# Patient Record
Sex: FEMALE | Race: WHITE | NOT HISPANIC OR LATINO
[De-identification: names, ages, dates, MRNs, and addresses within clinical notes are randomized per-mention and may not be internally consistent; named-entity substitution may affect disease eponyms.]

---

## 2017-01-08 ENCOUNTER — TRANSCRIPTION ENCOUNTER (OUTPATIENT)
Age: 37
End: 2017-01-08

## 2017-07-31 ENCOUNTER — APPOINTMENT (OUTPATIENT)
Dept: MRI IMAGING | Facility: CLINIC | Age: 37
End: 2017-07-31
Payer: COMMERCIAL

## 2017-07-31 ENCOUNTER — OUTPATIENT (OUTPATIENT)
Dept: OUTPATIENT SERVICES | Facility: HOSPITAL | Age: 37
LOS: 1 days | End: 2017-07-31

## 2017-07-31 PROCEDURE — 72148 MRI LUMBAR SPINE W/O DYE: CPT | Mod: 26

## 2017-08-16 ENCOUNTER — APPOINTMENT (OUTPATIENT)
Dept: ORTHOPEDIC SURGERY | Facility: CLINIC | Age: 37
End: 2017-08-16
Payer: COMMERCIAL

## 2017-08-16 VITALS
HEIGHT: 62 IN | SYSTOLIC BLOOD PRESSURE: 110 MMHG | WEIGHT: 127 LBS | DIASTOLIC BLOOD PRESSURE: 60 MMHG | BODY MASS INDEX: 23.37 KG/M2

## 2017-08-16 DIAGNOSIS — Z80.9 FAMILY HISTORY OF MALIGNANT NEOPLASM, UNSPECIFIED: ICD-10-CM

## 2017-08-16 DIAGNOSIS — Z82.61 FAMILY HISTORY OF ARTHRITIS: ICD-10-CM

## 2017-08-16 DIAGNOSIS — Z87.19 PERSONAL HISTORY OF OTHER DISEASES OF THE DIGESTIVE SYSTEM: ICD-10-CM

## 2017-08-16 DIAGNOSIS — Z78.9 OTHER SPECIFIED HEALTH STATUS: ICD-10-CM

## 2017-08-16 PROCEDURE — 99204 OFFICE O/P NEW MOD 45 MIN: CPT

## 2017-08-18 ENCOUNTER — OTHER (OUTPATIENT)
Age: 37
End: 2017-08-18

## 2017-08-19 ENCOUNTER — TRANSCRIPTION ENCOUNTER (OUTPATIENT)
Age: 37
End: 2017-08-19

## 2017-09-10 ENCOUNTER — EMERGENCY (EMERGENCY)
Facility: HOSPITAL | Age: 37
LOS: 1 days | Discharge: HOME | End: 2017-09-10
Attending: EMERGENCY MEDICINE | Admitting: EMERGENCY MEDICINE
Payer: COMMERCIAL

## 2017-09-10 VITALS
TEMPERATURE: 98 F | WEIGHT: 126.99 LBS | RESPIRATION RATE: 14 BRPM | DIASTOLIC BLOOD PRESSURE: 62 MMHG | OXYGEN SATURATION: 100 % | HEIGHT: 62 IN | HEART RATE: 72 BPM | SYSTOLIC BLOOD PRESSURE: 116 MMHG

## 2017-09-10 DIAGNOSIS — M79.662 PAIN IN LEFT LOWER LEG: ICD-10-CM

## 2017-09-10 DIAGNOSIS — Z79.52 LONG TERM (CURRENT) USE OF SYSTEMIC STEROIDS: ICD-10-CM

## 2017-09-10 DIAGNOSIS — Z79.2 LONG TERM (CURRENT) USE OF ANTIBIOTICS: ICD-10-CM

## 2017-09-10 DIAGNOSIS — M25.569 PAIN IN UNSPECIFIED KNEE: Chronic | ICD-10-CM

## 2017-09-10 LAB
ALBUMIN SERPL ELPH-MCNC: 4.4 G/DL — SIGNIFICANT CHANGE UP (ref 3.4–5)
ALP SERPL-CCNC: 58 U/L — SIGNIFICANT CHANGE UP (ref 40–120)
ALT FLD-CCNC: 30 U/L — SIGNIFICANT CHANGE UP (ref 12–42)
ANION GAP SERPL CALC-SCNC: 10 MMOL/L — SIGNIFICANT CHANGE UP (ref 9–16)
AST SERPL-CCNC: 27 U/L — SIGNIFICANT CHANGE UP (ref 15–37)
BASOPHILS NFR BLD AUTO: 0.5 % — SIGNIFICANT CHANGE UP (ref 0–2)
BILIRUB SERPL-MCNC: 0.4 MG/DL — SIGNIFICANT CHANGE UP (ref 0.2–1.2)
BUN SERPL-MCNC: 23 MG/DL — SIGNIFICANT CHANGE UP (ref 7–23)
CALCIUM SERPL-MCNC: 9.1 MG/DL — SIGNIFICANT CHANGE UP (ref 8.5–10.5)
CHLORIDE SERPL-SCNC: 102 MMOL/L — SIGNIFICANT CHANGE UP (ref 96–108)
CK SERPL-CCNC: 89 U/L — SIGNIFICANT CHANGE UP (ref 26–192)
CO2 SERPL-SCNC: 27 MMOL/L — SIGNIFICANT CHANGE UP (ref 22–31)
CREAT SERPL-MCNC: 0.81 MG/DL — SIGNIFICANT CHANGE UP (ref 0.5–1.3)
D DIMER BLD IA.RAPID-MCNC: 414 NG/ML DDU — HIGH
EOSINOPHIL NFR BLD AUTO: 6 % — SIGNIFICANT CHANGE UP (ref 0–6)
GLUCOSE SERPL-MCNC: 95 MG/DL — SIGNIFICANT CHANGE UP (ref 70–99)
HCG SERPL-ACNC: <1 MIU/ML — SIGNIFICANT CHANGE UP
HCT VFR BLD CALC: 39.9 % — SIGNIFICANT CHANGE UP (ref 34.5–45)
HGB BLD-MCNC: 13.2 G/DL — SIGNIFICANT CHANGE UP (ref 11.5–15.5)
IMM GRANULOCYTES NFR BLD AUTO: 0.3 % — SIGNIFICANT CHANGE UP (ref 0–1.5)
LYMPHOCYTES # BLD AUTO: 27.2 % — SIGNIFICANT CHANGE UP (ref 13–44)
MCHC RBC-ENTMCNC: 30.3 PG — SIGNIFICANT CHANGE UP (ref 27–34)
MCHC RBC-ENTMCNC: 33.1 G/DL — SIGNIFICANT CHANGE UP (ref 32–36)
MCV RBC AUTO: 91.5 FL — SIGNIFICANT CHANGE UP (ref 80–100)
MONOCYTES NFR BLD AUTO: 6.7 % — SIGNIFICANT CHANGE UP (ref 2–14)
NEUTROPHILS NFR BLD AUTO: 59.3 % — SIGNIFICANT CHANGE UP (ref 43–77)
PLATELET # BLD AUTO: 234 K/UL — SIGNIFICANT CHANGE UP (ref 150–400)
POTASSIUM SERPL-MCNC: 3.8 MMOL/L — SIGNIFICANT CHANGE UP (ref 3.5–5.3)
POTASSIUM SERPL-SCNC: 3.8 MMOL/L — SIGNIFICANT CHANGE UP (ref 3.5–5.3)
PROT SERPL-MCNC: 8.3 G/DL — HIGH (ref 6.4–8.2)
RBC # BLD: 4.36 M/UL — SIGNIFICANT CHANGE UP (ref 3.8–5.2)
RBC # FLD: 13.9 % — SIGNIFICANT CHANGE UP (ref 10.3–16.9)
SODIUM SERPL-SCNC: 139 MMOL/L — SIGNIFICANT CHANGE UP (ref 132–145)
WBC # BLD: 7.9 K/UL — SIGNIFICANT CHANGE UP (ref 3.8–10.5)
WBC # FLD AUTO: 7.9 K/UL — SIGNIFICANT CHANGE UP (ref 3.8–10.5)

## 2017-09-10 PROCEDURE — 93970 EXTREMITY STUDY: CPT | Mod: 26

## 2017-09-10 PROCEDURE — 99284 EMERGENCY DEPT VISIT MOD MDM: CPT | Mod: 25

## 2017-09-10 PROCEDURE — 93010 ELECTROCARDIOGRAM REPORT: CPT

## 2017-09-10 NOTE — ED ADULT NURSE NOTE - NS ED NURSE DC INFO COMPLEXITY
Verbalized Understanding/Complex: Multiple Rx/Tx. Pt has difficulty understanding. Requires additional help/Patient asked questions/Returned Demonstration

## 2017-09-10 NOTE — ED PROVIDER NOTE - OBJECTIVE STATEMENT
36 y/o F with no pmhx p/w LLE pain for the past 3-4 days which started while exercising. Pt reports today she had sudden onset sharp pain and palpitations.

## 2017-09-26 ENCOUNTER — APPOINTMENT (OUTPATIENT)
Dept: ORTHOPEDIC SURGERY | Facility: CLINIC | Age: 37
End: 2017-09-26
Payer: COMMERCIAL

## 2017-09-26 VITALS — WEIGHT: 127 LBS | HEIGHT: 62 IN | BODY MASS INDEX: 23.37 KG/M2

## 2017-09-26 DIAGNOSIS — M54.5 LOW BACK PAIN: ICD-10-CM

## 2017-09-26 DIAGNOSIS — M51.36 OTHER INTERVERTEBRAL DISC DEGENERATION, LUMBAR REGION: ICD-10-CM

## 2017-09-26 DIAGNOSIS — G89.29 LOW BACK PAIN: ICD-10-CM

## 2017-09-26 PROCEDURE — 99214 OFFICE O/P EST MOD 30 MIN: CPT

## 2018-02-21 ENCOUNTER — APPOINTMENT (OUTPATIENT)
Dept: DERMATOLOGY | Facility: CLINIC | Age: 38
End: 2018-02-21

## 2018-03-10 ENCOUNTER — APPOINTMENT (OUTPATIENT)
Dept: DERMATOLOGY | Facility: CLINIC | Age: 38
End: 2018-03-10

## 2018-03-12 ENCOUNTER — OUTPATIENT (OUTPATIENT)
Dept: OUTPATIENT SERVICES | Facility: HOSPITAL | Age: 38
LOS: 1 days | End: 2018-03-12
Payer: COMMERCIAL

## 2018-03-12 ENCOUNTER — APPOINTMENT (OUTPATIENT)
Dept: ULTRASOUND IMAGING | Facility: CLINIC | Age: 38
End: 2018-03-12
Payer: COMMERCIAL

## 2018-03-12 DIAGNOSIS — M25.569 PAIN IN UNSPECIFIED KNEE: Chronic | ICD-10-CM

## 2018-03-12 PROCEDURE — 76641 ULTRASOUND BREAST COMPLETE: CPT

## 2018-03-12 PROCEDURE — 76641 ULTRASOUND BREAST COMPLETE: CPT | Mod: 26,LT

## 2018-07-13 ENCOUNTER — EMERGENCY (EMERGENCY)
Facility: HOSPITAL | Age: 38
LOS: 1 days | Discharge: HOME | End: 2018-07-13
Attending: EMERGENCY MEDICINE | Admitting: EMERGENCY MEDICINE
Payer: COMMERCIAL

## 2018-07-13 ENCOUNTER — TRANSCRIPTION ENCOUNTER (OUTPATIENT)
Age: 38
End: 2018-07-13

## 2018-07-13 VITALS
SYSTOLIC BLOOD PRESSURE: 111 MMHG | WEIGHT: 119.93 LBS | OXYGEN SATURATION: 99 % | TEMPERATURE: 99 F | HEART RATE: 87 BPM | RESPIRATION RATE: 16 BRPM | DIASTOLIC BLOOD PRESSURE: 77 MMHG

## 2018-07-13 DIAGNOSIS — R09.89 OTHER SPECIFIED SYMPTOMS AND SIGNS INVOLVING THE CIRCULATORY AND RESPIRATORY SYSTEMS: ICD-10-CM

## 2018-07-13 DIAGNOSIS — Z88.0 ALLERGY STATUS TO PENICILLIN: ICD-10-CM

## 2018-07-13 DIAGNOSIS — J02.9 ACUTE PHARYNGITIS, UNSPECIFIED: ICD-10-CM

## 2018-07-13 DIAGNOSIS — Z79.899 OTHER LONG TERM (CURRENT) DRUG THERAPY: ICD-10-CM

## 2018-07-13 DIAGNOSIS — M25.569 PAIN IN UNSPECIFIED KNEE: Chronic | ICD-10-CM

## 2018-07-13 PROCEDURE — 99283 EMERGENCY DEPT VISIT LOW MDM: CPT | Mod: 25

## 2018-07-13 PROCEDURE — 72040 X-RAY EXAM NECK SPINE 2-3 VW: CPT | Mod: 26

## 2018-07-13 PROCEDURE — 70360 X-RAY EXAM OF NECK: CPT | Mod: 26

## 2018-07-13 NOTE — ED PROVIDER NOTE - PHYSICAL EXAMINATION
VITAL SIGNS: I have reviewed nursing notes and confirm.  CONSTITUTIONAL: Well-developed; well-nourished; in no acute distress.  SKIN: Skin is warm and dry, no acute rash.  HEAD: Normocephalic; atraumatic.  EYES: PERRL, EOM intact; conjunctiva and sclera clear.  ENT: No nasal discharge; airway clear. No throat tenderness on palpation  NECK: Supple; non tender.  CARD: S1, S2 normal; no murmurs, gallops, or rubs. Regular rate and rhythm.  RESP: No wheezes, rales or rhonchi.  ABD: Normal bowel sounds; soft; non-distended; non-tender; no hepatosplenomegaly.  EXT: Normal ROM. No clubbing, cyanosis or edema.  NEURO: Alert, oriented. Grossly unremarkable.  PSYCH: Cooperative, appropriate. VITAL SIGNS: I have reviewed nursing notes and confirm.  CONSTITUTIONAL: Well-developed; well-nourished; in no acute distress.  SKIN: Skin is warm and dry, no acute rash.  HEAD: Normocephalic; atraumatic.  EYES: PERRL, EOM intact; conjunctiva and sclera clear.  ENT: No nasal discharge; airway clear. No throat tenderness on palpation.  No crepitus.   NECK: Supple; non tender.  CARD: S1, S2 normal; no murmurs, gallops, or rubs. Regular rate and rhythm.  RESP: No wheezes, rales or rhonchi.  No TTP over rib cage.   ABD: Normal bowel sounds; soft; non-distended; non-tender; no hepatosplenomegaly.  EXT: Normal ROM. No clubbing, cyanosis or edema.  NEURO: Alert, oriented. Grossly unremarkable.  PSYCH: Cooperative, appropriate.

## 2018-07-13 NOTE — ED ADULT NURSE NOTE - OBJECTIVE STATEMENT
Pt c/o throat pain and hoarse voice s/p Heimlich from choking on raw clam. Pt denies any difficulty breathing, airway patent, speaking in full complete sentences.

## 2018-07-13 NOTE — ED PROVIDER NOTE - MEDICAL DECISION MAKING DETAILS
will order Xray to r/o any soft tissue damage. will order Xray to r/o any soft tissue damage.  Pt appears clinically well and stable.     Discussed xray and emergent return precautions.

## 2018-07-13 NOTE — ED ADULT NURSE NOTE - CHPI ED SYMPTOMS NEG
no loss of consciousness/no nausea/no vomiting/no change in level of consciousness/no chills/no weakness/no fever/no numbness/no syncope/no blurred vision
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
## 2018-07-26 ENCOUNTER — APPOINTMENT (OUTPATIENT)
Dept: HUMAN REPRODUCTION | Facility: CLINIC | Age: 38
End: 2018-07-26
Payer: COMMERCIAL

## 2018-07-26 PROCEDURE — 99204 OFFICE O/P NEW MOD 45 MIN: CPT | Mod: 25

## 2018-07-26 PROCEDURE — 36415 COLL VENOUS BLD VENIPUNCTURE: CPT

## 2018-07-26 PROCEDURE — 76830 TRANSVAGINAL US NON-OB: CPT

## 2018-08-23 ENCOUNTER — APPOINTMENT (OUTPATIENT)
Dept: HUMAN REPRODUCTION | Facility: CLINIC | Age: 38
End: 2018-08-23

## 2018-10-25 ENCOUNTER — EMERGENCY (EMERGENCY)
Facility: HOSPITAL | Age: 38
LOS: 1 days | Discharge: HOME | End: 2018-10-25
Attending: EMERGENCY MEDICINE | Admitting: EMERGENCY MEDICINE
Payer: COMMERCIAL

## 2018-10-25 VITALS
TEMPERATURE: 98 F | RESPIRATION RATE: 18 BRPM | DIASTOLIC BLOOD PRESSURE: 79 MMHG | SYSTOLIC BLOOD PRESSURE: 113 MMHG | OXYGEN SATURATION: 100 % | WEIGHT: 138.01 LBS | HEIGHT: 62 IN | HEART RATE: 82 BPM

## 2018-10-25 DIAGNOSIS — Z79.899 OTHER LONG TERM (CURRENT) DRUG THERAPY: ICD-10-CM

## 2018-10-25 DIAGNOSIS — Z79.2 LONG TERM (CURRENT) USE OF ANTIBIOTICS: ICD-10-CM

## 2018-10-25 DIAGNOSIS — M25.569 PAIN IN UNSPECIFIED KNEE: Chronic | ICD-10-CM

## 2018-10-25 DIAGNOSIS — O20.9 HEMORRHAGE IN EARLY PREGNANCY, UNSPECIFIED: ICD-10-CM

## 2018-10-25 DIAGNOSIS — Z3A.16 16 WEEKS GESTATION OF PREGNANCY: ICD-10-CM

## 2018-10-25 PROBLEM — K29.70 GASTRITIS, UNSPECIFIED, WITHOUT BLEEDING: Chronic | Status: ACTIVE | Noted: 2017-09-10

## 2018-10-25 LAB
ALBUMIN SERPL ELPH-MCNC: 3.9 G/DL — SIGNIFICANT CHANGE UP (ref 3.3–5)
ALP SERPL-CCNC: 40 U/L — SIGNIFICANT CHANGE UP (ref 40–120)
ALT FLD-CCNC: 32 U/L — SIGNIFICANT CHANGE UP (ref 10–45)
ANION GAP SERPL CALC-SCNC: 13 MMOL/L — SIGNIFICANT CHANGE UP (ref 5–17)
APPEARANCE UR: CLEAR — SIGNIFICANT CHANGE UP
AST SERPL-CCNC: 27 U/L — SIGNIFICANT CHANGE UP (ref 10–40)
BASOPHILS NFR BLD AUTO: 0.2 % — SIGNIFICANT CHANGE UP (ref 0–2)
BILIRUB SERPL-MCNC: 0.3 MG/DL — SIGNIFICANT CHANGE UP (ref 0.2–1.2)
BILIRUB UR-MCNC: NEGATIVE — SIGNIFICANT CHANGE UP
BLD GP AB SCN SERPL QL: NEGATIVE — SIGNIFICANT CHANGE UP
BUN SERPL-MCNC: 14 MG/DL — SIGNIFICANT CHANGE UP (ref 7–23)
CALCIUM SERPL-MCNC: 9.6 MG/DL — SIGNIFICANT CHANGE UP (ref 8.4–10.5)
CHLORIDE SERPL-SCNC: 100 MMOL/L — SIGNIFICANT CHANGE UP (ref 96–108)
CO2 SERPL-SCNC: 24 MMOL/L — SIGNIFICANT CHANGE UP (ref 22–31)
COLOR SPEC: YELLOW — SIGNIFICANT CHANGE UP
CREAT SERPL-MCNC: 0.62 MG/DL — SIGNIFICANT CHANGE UP (ref 0.5–1.3)
DIFF PNL FLD: ABNORMAL
EOSINOPHIL NFR BLD AUTO: 6.7 % — HIGH (ref 0–6)
GLUCOSE SERPL-MCNC: 88 MG/DL — SIGNIFICANT CHANGE UP (ref 70–99)
GLUCOSE UR QL: NEGATIVE — SIGNIFICANT CHANGE UP
HCT VFR BLD CALC: 32.3 % — LOW (ref 34.5–45)
HGB BLD-MCNC: 10.9 G/DL — LOW (ref 11.5–15.5)
KETONES UR-MCNC: NEGATIVE — SIGNIFICANT CHANGE UP
LEUKOCYTE ESTERASE UR-ACNC: NEGATIVE — SIGNIFICANT CHANGE UP
LYMPHOCYTES # BLD AUTO: 27.6 % — SIGNIFICANT CHANGE UP (ref 13–44)
MCHC RBC-ENTMCNC: 30.8 PG — SIGNIFICANT CHANGE UP (ref 27–34)
MCHC RBC-ENTMCNC: 33.7 G/DL — SIGNIFICANT CHANGE UP (ref 32–36)
MCV RBC AUTO: 91.2 FL — SIGNIFICANT CHANGE UP (ref 80–100)
MONOCYTES NFR BLD AUTO: 10.3 % — SIGNIFICANT CHANGE UP (ref 2–14)
NEUTROPHILS NFR BLD AUTO: 55.2 % — SIGNIFICANT CHANGE UP (ref 43–77)
NITRITE UR-MCNC: NEGATIVE — SIGNIFICANT CHANGE UP
PH UR: 5.5 — SIGNIFICANT CHANGE UP (ref 5–8)
PLATELET # BLD AUTO: 198 K/UL — SIGNIFICANT CHANGE UP (ref 150–400)
POTASSIUM SERPL-MCNC: 3.6 MMOL/L — SIGNIFICANT CHANGE UP (ref 3.5–5.3)
POTASSIUM SERPL-SCNC: 3.6 MMOL/L — SIGNIFICANT CHANGE UP (ref 3.5–5.3)
PROT SERPL-MCNC: 6.8 G/DL — SIGNIFICANT CHANGE UP (ref 6–8.3)
PROT UR-MCNC: NEGATIVE MG/DL — SIGNIFICANT CHANGE UP
RBC # BLD: 3.54 M/UL — LOW (ref 3.8–5.2)
RBC # FLD: 13.6 % — SIGNIFICANT CHANGE UP (ref 10.3–16.9)
RH IG SCN BLD-IMP: POSITIVE — SIGNIFICANT CHANGE UP
SODIUM SERPL-SCNC: 137 MMOL/L — SIGNIFICANT CHANGE UP (ref 135–145)
SP GR SPEC: 1.02 — SIGNIFICANT CHANGE UP (ref 1–1.03)
UROBILINOGEN FLD QL: 0.2 E.U./DL — SIGNIFICANT CHANGE UP
WBC # BLD: 8.6 K/UL — SIGNIFICANT CHANGE UP (ref 3.8–10.5)
WBC # FLD AUTO: 8.6 K/UL — SIGNIFICANT CHANGE UP (ref 3.8–10.5)

## 2018-10-25 PROCEDURE — 99284 EMERGENCY DEPT VISIT MOD MDM: CPT | Mod: 25

## 2018-10-25 PROCEDURE — 76805 OB US >/= 14 WKS SNGL FETUS: CPT

## 2018-10-25 PROCEDURE — 86901 BLOOD TYPING SEROLOGIC RH(D): CPT

## 2018-10-25 PROCEDURE — 76805 OB US >/= 14 WKS SNGL FETUS: CPT | Mod: 26

## 2018-10-25 PROCEDURE — 80053 COMPREHEN METABOLIC PANEL: CPT

## 2018-10-25 PROCEDURE — 86900 BLOOD TYPING SEROLOGIC ABO: CPT

## 2018-10-25 PROCEDURE — 86850 RBC ANTIBODY SCREEN: CPT

## 2018-10-25 PROCEDURE — 76817 TRANSVAGINAL US OBSTETRIC: CPT

## 2018-10-25 PROCEDURE — 84702 CHORIONIC GONADOTROPIN TEST: CPT

## 2018-10-25 PROCEDURE — 76817 TRANSVAGINAL US OBSTETRIC: CPT | Mod: 26

## 2018-10-25 PROCEDURE — 36415 COLL VENOUS BLD VENIPUNCTURE: CPT

## 2018-10-25 PROCEDURE — 87086 URINE CULTURE/COLONY COUNT: CPT

## 2018-10-25 PROCEDURE — 85025 COMPLETE CBC W/AUTO DIFF WBC: CPT

## 2018-10-25 PROCEDURE — 81001 URINALYSIS AUTO W/SCOPE: CPT

## 2018-10-25 NOTE — CONSULT NOTE ADULT - SUBJECTIVE AND OBJECTIVE BOX
Patient evaluated at bedside.   Patient denies fever, chills, chest pain, SOB, abdominal pain, nausea, vomiting, vaginal bleeding      OBHx:   GYN Hx:  PMHx:   SHx:  Meds:   Allergies:     PHYSICAL EXAM:   Vital Signs Last 24 Hrs  T(C): 36.6 (25 Oct 2018 00:37), Max: 36.6 (25 Oct 2018 00:37)  T(F): 97.9 (25 Oct 2018 00:37), Max: 97.9 (25 Oct 2018 00:37)  HR: 82 (25 Oct 2018 00:37) (82 - 82)  BP: 113/79 (25 Oct 2018 00:37) (113/79 - 113/79)  BP(mean): --  RR: 18 (25 Oct 2018 00:37) (18 - 18)  SpO2: 100% (25 Oct 2018 00:37) (100% - 100%)    **************************  Constitutional: Alert & Oriented x3, No acute distress  Respiratory: Clear to ausculation bilaterally; no wheezing, rhonchi, or crackles  Cardiovascular: regular rate and rhythm, no murmurs, or gallops  Gastrointestinal: soft, non tender, positive bowel sounds, no rebound or guarding   Pelvic exam:   Extremities: no calf tenderness or swelling      LABS:                        10.9   8.6   )-----------( 198      ( 25 Oct 2018 02:02 )             32.3             Urinalysis Basic - ( 25 Oct 2018 01:58 )    Color: Yellow / Appearance: Clear / S.025 / pH: x  Gluc: x / Ketone: NEGATIVE  / Bili: Negative / Urobili: 0.2 E.U./dL   Blood: x / Protein: NEGATIVE mg/dL / Nitrite: NEGATIVE   Leuk Esterase: NEGATIVE / RBC: x / WBC x   Sq Epi: x / Non Sq Epi: x / Bacteria: x          RADIOLOGY & ADDITIONAL STUDIES:< from: US Echo Transvaginal, OB (10.25.18 @ 01:59) >  EXAM:  US OB TRANSVAGINAL                          EXAM:  US OB GRT THAN 14 WKS 1ST GEST                          PROCEDURE DATE:  10/25/2018          INTERPRETATION:  OBSTETRICAL ULTRASOUND - FIRST TRIMESTER dated   10/25/2018 1:54 AM    INDICATION: First trimester pregnancy with vaginal bleeding. History of   twin gestation with spontaneous miscarriage of one twin at 7 weeks. LMP:   2018. Beta-hCG is not available.    TECHNIQUE: Transabdominal views of the pelvis were obtained followed by  transvaginal views for better visualization of the endometrial cavity.      PRIOR STUDIES: None.    FINDINGS:   By dates, the estimated gestational age is 16 weeks 1 day.    Two intrauterine gestations are visible.     For twin A:  Average ultrasound age is 16 weeks 5 days.  Biparietal diameter is 3.5 cm, corresponding to a gestational age of 16   weeks 6 days.  Head circumference is 12.8 cm, corresponding to a gestational age of 16   weeks 4 days.  Abdominal circumference is 11.5 cm, corresponding to a gestational age of   17 weeks 3 days.  Femur length is 2.0 cm, corresponding to a gestational age of 60 weeks 1   day.  Fetal cardiac motion is visible with fetal heart rate of 154 beats per   minute.    A normal amount of amniotic fluid is evident for twin A. The placenta is   located posteriorly. No placenta previa is evident.  No subchorionic   bleed is visible.  The cervix is closed with a length of 4.1 cm.    A second nonviable fetus is noted with a crown-rump length of 1.3 cm.    The uterus is anteverted. The uterus is 15.2 x 11.3 x 12.7 cm.  There is   a 1.6 x 1.4 x 2.0 cm intramural fibroid in the anterior wall.     The right ovary is normal in size and appearance, measuring 1.8 x 2.8 x   2.1 cm. The left ovary is normal in size and appearance, measuring 1.8 x   3.2 x 1.4 cm. Doppler evaluation demonstrates flow to both ovaries with   no evidence of torsion.    No free fluid in the pelvis.      IMPRESSION:   1.  Two intrauterine gestations are visible, one of which is nonviable.   The viable gestation has an average ultrasound age of 16 weeks 5 days.  2.  Fibroid uterus.              "Thank you for the opportunity to participate in the care of this   patient."    MARIE DIAZ M.D., RADIOLOGY RESIDENT  This document has been electronically signed.  ROMI GRAHAM M.D., ATTENDING RADIOLOGIST  This document has been electronically signed. Oct 25 2018  2:15AM                  < end of copied text > 37 yo [id number] at  presents for evaluation of vaginal bleeding. Patient re  Patient denies fever, chills, chest pain, SOB, abdominal pain, nausea, vomiting, vaginal bleeding      OBHx:   GYN Hx:  PMHx:   SHx:  Meds:   Allergies:     PHYSICAL EXAM:   Vital Signs Last 24 Hrs  T(C): 36.6 (25 Oct 2018 00:37), Max: 36.6 (25 Oct 2018 00:37)  T(F): 97.9 (25 Oct 2018 00:37), Max: 97.9 (25 Oct 2018 00:37)  HR: 82 (25 Oct 2018 00:37) (82 - 82)  BP: 113/79 (25 Oct 2018 00:37) (113/79 - 113/79)  BP(mean): --  RR: 18 (25 Oct 2018 00:37) (18 - 18)  SpO2: 100% (25 Oct 2018 00:37) (100% - 100%)    **************************  Constitutional: Alert & Oriented x3, No acute distress  Respiratory: Clear to ausculation bilaterally; no wheezing, rhonchi, or crackles  Cardiovascular: regular rate and rhythm, no murmurs, or gallops  Gastrointestinal: soft, non tender, positive bowel sounds, no rebound or guarding   Pelvic exam:   Extremities: no calf tenderness or swelling      LABS:                        10.9   8.6   )-----------( 198      ( 25 Oct 2018 02:02 )             32.3             Urinalysis Basic - ( 25 Oct 2018 01:58 )    Color: Yellow / Appearance: Clear / S.025 / pH: x  Gluc: x / Ketone: NEGATIVE  / Bili: Negative / Urobili: 0.2 E.U./dL   Blood: x / Protein: NEGATIVE mg/dL / Nitrite: NEGATIVE   Leuk Esterase: NEGATIVE / RBC: x / WBC x   Sq Epi: x / Non Sq Epi: x / Bacteria: x          RADIOLOGY & ADDITIONAL STUDIES:< from: US Echo Transvaginal, OB (10.25.18 @ 01:59) >  EXAM:  US OB TRANSVAGINAL                          EXAM:  US OB GRT THAN 14 WKS 1ST GEST                          PROCEDURE DATE:  10/25/2018          INTERPRETATION:  OBSTETRICAL ULTRASOUND - FIRST TRIMESTER dated   10/25/2018 1:54 AM    INDICATION: First trimester pregnancy with vaginal bleeding. History of   twin gestation with spontaneous miscarriage of one twin at 7 weeks. LMP:   2018. Beta-hCG is not available.    TECHNIQUE: Transabdominal views of the pelvis were obtained followed by  transvaginal views for better visualization of the endometrial cavity.      PRIOR STUDIES: None.    FINDINGS:   By dates, the estimated gestational age is 16 weeks 1 day.    Two intrauterine gestations are visible.     For twin A:  Average ultrasound age is 16 weeks 5 days.  Biparietal diameter is 3.5 cm, corresponding to a gestational age of 16   weeks 6 days.  Head circumference is 12.8 cm, corresponding to a gestational age of 16   weeks 4 days.  Abdominal circumference is 11.5 cm, corresponding to a gestational age of   17 weeks 3 days.  Femur length is 2.0 cm, corresponding to a gestational age of 60 weeks 1   day.  Fetal cardiac motion is visible with fetal heart rate of 154 beats per   minute.    A normal amount of amniotic fluid is evident for twin A. The placenta is   located posteriorly. No placenta previa is evident.  No subchorionic   bleed is visible.  The cervix is closed with a length of 4.1 cm.    A second nonviable fetus is noted with a crown-rump length of 1.3 cm.    The uterus is anteverted. The uterus is 15.2 x 11.3 x 12.7 cm.  There is   a 1.6 x 1.4 x 2.0 cm intramural fibroid in the anterior wall.     The right ovary is normal in size and appearance, measuring 1.8 x 2.8 x   2.1 cm. The left ovary is normal in size and appearance, measuring 1.8 x   3.2 x 1.4 cm. Doppler evaluation demonstrates flow to both ovaries with   no evidence of torsion.    No free fluid in the pelvis.      IMPRESSION:   1.  Two intrauterine gestations are visible, one of which is nonviable.   The viable gestation has an average ultrasound age of 16 weeks 5 days.  2.  Fibroid uterus.              "Thank you for the opportunity to participate in the care of this   patient."    MARIE DIAZ M.D., RADIOLOGY RESIDENT  This document has been electronically signed.  ROMI GRAHAM M.D., ATTENDING RADIOLOGIST  This document has been electronically signed. Oct 25 2018  2:15AM                  < end of copied text > 39 yo [id number] at 16w1 by LMP of 18 presents for evaluation of vaginal bleeding. Patient reports having intercourse and then the onset of bleeding without clots. Patient denies abdominal pain, fever, chills, chest pain, SOB, abdominal pain, nausea, vomiting, and leakage of fluid. This pregnancy was a di-di pregnancy which was complicated by a spontaneous loss of Twin B at 7 weeks.     OBHx:  x3 - last , VTOP D&C x1, SAB x2; G7: current - clomid induced   GYN Hx: History of abnormal pap smear s/p colposcopy now with normal pap smears  PMHx: Denies   SHx: D&C, Left knee arthroscopy   Meds: PNV,   Allergies: nkda     PHYSICAL EXAM:   Vital Signs Last 24 Hrs  T(C): 36.6 (25 Oct 2018 00:37), Max: 36.6 (25 Oct 2018 00:37)  T(F): 97.9 (25 Oct 2018 00:37), Max: 97.9 (25 Oct 2018 00:37)  HR: 82 (25 Oct 2018 00:37) (82 - 82)  BP: 113/79 (25 Oct 2018 00:37) (113/79 - 113/79)  BP(mean): --  RR: 18 (25 Oct 2018 00:37) (18 - 18)  SpO2: 100% (25 Oct 2018 00:37) (100% - 100%)    **************************  Constitutional: Alert & Oriented x3, No acute distress  Gastrointestinal: soft, non tender, positive bowel sounds, no rebound or guarding   Pelvic exam: normal appearing external female genitalia, normal vagina, cervix appears closed, about 5 cc of blood in the vault, no adnexal fulls, cervic long and closed   Extremities: no calf tenderness or swelling      LABS:                        10.9   8.6   )-----------( 198      ( 25 Oct 2018 02:02 )             32.3             Urinalysis Basic - ( 25 Oct 2018 01:58 )    Color: Yellow / Appearance: Clear / S.025 / pH: x  Gluc: x / Ketone: NEGATIVE  / Bili: Negative / Urobili: 0.2 E.U./dL   Blood: x / Protein: NEGATIVE mg/dL / Nitrite: NEGATIVE   Leuk Esterase: NEGATIVE / RBC: x / WBC x   Sq Epi: x / Non Sq Epi: x / Bacteria: x          RADIOLOGY & ADDITIONAL STUDIES:< from: US Echo Transvaginal, OB (10.25.18 @ 01:59) >  EXAM:  US OB TRANSVAGINAL                          EXAM:  US OB GRT THAN 14 WKS 1ST GEST                          PROCEDURE DATE:  10/25/2018          INTERPRETATION:  OBSTETRICAL ULTRASOUND - FIRST TRIMESTER dated   10/25/2018 1:54 AM    INDICATION: First trimester pregnancy with vaginal bleeding. History of   twin gestation with spontaneous miscarriage of one twin at 7 weeks. LMP:   2018. Beta-hCG is not available.    TECHNIQUE: Transabdominal views of the pelvis were obtained followed by  transvaginal views for better visualization of the endometrial cavity.      PRIOR STUDIES: None.    FINDINGS:   By dates, the estimated gestational age is 16 weeks 1 day.    Two intrauterine gestations are visible.     For twin A:  Average ultrasound age is 16 weeks 5 days.  Biparietal diameter is 3.5 cm, corresponding to a gestational age of 16   weeks 6 days.  Head circumference is 12.8 cm, corresponding to a gestational age of 16   weeks 4 days.  Abdominal circumference is 11.5 cm, corresponding to a gestational age of   17 weeks 3 days.  Femur length is 2.0 cm, corresponding to a gestational age of 60 weeks 1   day.  Fetal cardiac motion is visible with fetal heart rate of 154 beats per   minute.    A normal amount of amniotic fluid is evident for twin A. The placenta is   located posteriorly. No placenta previa is evident.  No subchorionic   bleed is visible.  The cervix is closed with a length of 4.1 cm.    A second nonviable fetus is noted with a crown-rump length of 1.3 cm.    The uterus is anteverted. The uterus is 15.2 x 11.3 x 12.7 cm.  There is   a 1.6 x 1.4 x 2.0 cm intramural fibroid in the anterior wall.     The right ovary is normal in size and appearance, measuring 1.8 x 2.8 x   2.1 cm. The left ovary is normal in size and appearance, measuring 1.8 x   3.2 x 1.4 cm. Doppler evaluation demonstrates flow to both ovaries with   no evidence of torsion.    No free fluid in the pelvis.      IMPRESSION:   1.  Two intrauterine gestations are visible, one of which is nonviable.   The viable gestation has an average ultrasound age of 16 weeks 5 days.  2.  Fibroid uterus.    "Thank you for the opportunity to participate in the care of this   patient."    MARIE DIAZ M.D., RADIOLOGY RESIDENT  This document has been electronically signed.  ROMI GRAHAM M.D., ATTENDING RADIOLOGIST  This document has been electronically signed. Oct 25 2018  2:15AM

## 2018-10-25 NOTE — ED PROVIDER NOTE - OBJECTIVE STATEMENT
38 yof pw vaginal bleeding, 16 wk GA, initially twin gestation, spontaneous miscarriage of one twins at 7 wk GA, , 38 yof pw vaginal bleeding, 16 wk GA, initially twin gestation, spontaneous miscarriage of one twins at 7 wk GA, .  no hx of fibroids or incompetent cervix.  states sx began after sexual intercourse tonight.  bleeding has slowed down, now spotting.

## 2018-10-25 NOTE — ED PROVIDER NOTE - PHYSICAL EXAMINATION
CON: ao x 3, HENMT: clear oropharynx, soft neck, HEAD: atraumatic, RESP: no tachypnea, no hypoxia, GI: gravid, soft, SKIN: no rash, MSK: no deformities, NEURO: no gross motor or sensory deficit

## 2018-10-25 NOTE — ED ADULT NURSE NOTE - OBJECTIVE STATEMENT
Pt aaox3, breathing RA freely, skin warm and dry; appears comfortable. Denies pain, reporting episode of vaginal bleeding after sex, now spotting. 16 weeks pregnant. Awaiting lab results; will continue to monitor.

## 2018-10-25 NOTE — CONSULT NOTE ADULT - ASSESSMENT
Assessment and Plan:   39 yo  at 16w1 presents for evaluation of vaginal bleeding.   - No acute gyn intervention needed at this time  - Vitals and Labs WNL; US reassuring   - On exam, no active bleeding: advise strict pelvic rest, avoidance of heavy lighting and heavy exercise   - Patient to follow up at [facility] unit on Friday and in the office next week   - Strict return precautions given   - Rh positive status - no need for rhogam     Discussed with Dr. Yepez

## 2018-10-25 NOTE — ED PROVIDER NOTE - MEDICAL DECISION MAKING DETAILS
16 wk ga w/ bleeding after intercourse, no hx of previa/fibroids/incompetent cervix, bedside US w/ HR 150s, w/ fetal movement, ?from trauma, ?previa, ?other causes, GYN consulted, pt pending TVUS

## 2018-10-25 NOTE — ED ADULT NURSE NOTE - NSIMPLEMENTINTERV_GEN_ALL_ED
Implemented All Universal Safety Interventions:  [place] to call system. Call bell, personal items and telephone within reach. Instruct patient to call for assistance. Room bathroom lighting operational. Non-slip footwear when patient is off stretcher. Physically safe environment: no spills, clutter or unnecessary equipment. Stretcher in lowest position, wheels locked, appropriate side rails in place.

## 2018-10-26 LAB
CULTURE RESULTS: NO GROWTH — SIGNIFICANT CHANGE UP
SPECIMEN SOURCE: SIGNIFICANT CHANGE UP

## 2019-04-03 ENCOUNTER — INPATIENT (INPATIENT)
Facility: HOSPITAL | Age: 39
LOS: 2 days | Discharge: HOME | End: 2019-04-06
Attending: OBSTETRICS & GYNECOLOGY | Admitting: OBSTETRICS & GYNECOLOGY
Payer: COMMERCIAL

## 2019-04-03 VITALS — HEIGHT: 62 IN | WEIGHT: 164.91 LBS

## 2019-04-03 DIAGNOSIS — M25.569 PAIN IN UNSPECIFIED KNEE: Chronic | ICD-10-CM

## 2019-04-03 LAB
BASOPHILS # BLD AUTO: 0.06 K/UL — SIGNIFICANT CHANGE UP (ref 0–0.2)
BASOPHILS NFR BLD AUTO: 0.5 % — SIGNIFICANT CHANGE UP (ref 0–2)
BLD GP AB SCN SERPL QL: NEGATIVE — SIGNIFICANT CHANGE UP
EOSINOPHIL # BLD AUTO: 0.4 K/UL — SIGNIFICANT CHANGE UP (ref 0–0.5)
EOSINOPHIL NFR BLD AUTO: 3.6 % — SIGNIFICANT CHANGE UP (ref 0–6)
HCT VFR BLD CALC: 39.4 % — SIGNIFICANT CHANGE UP (ref 34.5–45)
HGB BLD-MCNC: 13.2 G/DL — SIGNIFICANT CHANGE UP (ref 11.5–15.5)
IMM GRANULOCYTES NFR BLD AUTO: 1.8 % — HIGH (ref 0–1.5)
LYMPHOCYTES # BLD AUTO: 1.82 K/UL — SIGNIFICANT CHANGE UP (ref 1–3.3)
LYMPHOCYTES # BLD AUTO: 16.4 % — SIGNIFICANT CHANGE UP (ref 13–44)
MCHC RBC-ENTMCNC: 30.9 PG — SIGNIFICANT CHANGE UP (ref 27–34)
MCHC RBC-ENTMCNC: 33.5 GM/DL — SIGNIFICANT CHANGE UP (ref 32–36)
MCV RBC AUTO: 92.3 FL — SIGNIFICANT CHANGE UP (ref 80–100)
MONOCYTES # BLD AUTO: 0.97 K/UL — HIGH (ref 0–0.9)
MONOCYTES NFR BLD AUTO: 8.7 % — SIGNIFICANT CHANGE UP (ref 2–14)
NEUTROPHILS # BLD AUTO: 7.64 K/UL — HIGH (ref 1.8–7.4)
NEUTROPHILS NFR BLD AUTO: 69 % — SIGNIFICANT CHANGE UP (ref 43–77)
NRBC # BLD: 0 /100 WBCS — SIGNIFICANT CHANGE UP (ref 0–0)
PLATELET # BLD AUTO: 196 K/UL — SIGNIFICANT CHANGE UP (ref 150–400)
RBC # BLD: 4.27 M/UL — SIGNIFICANT CHANGE UP (ref 3.8–5.2)
RBC # FLD: 13.6 % — SIGNIFICANT CHANGE UP (ref 10.3–14.5)
RH IG SCN BLD-IMP: POSITIVE — SIGNIFICANT CHANGE UP
T PALLIDUM AB TITR SER: NEGATIVE — SIGNIFICANT CHANGE UP
WBC # BLD: 11.09 K/UL — HIGH (ref 3.8–10.5)
WBC # FLD AUTO: 11.09 K/UL — HIGH (ref 3.8–10.5)

## 2019-04-03 RX ORDER — OXYTOCIN 10 UNIT/ML
333.33 VIAL (ML) INJECTION
Qty: 20 | Refills: 0 | Status: DISCONTINUED | OUTPATIENT
Start: 2019-04-03 | End: 2019-04-04

## 2019-04-03 RX ORDER — SODIUM CHLORIDE 9 MG/ML
1000 INJECTION, SOLUTION INTRAVENOUS
Qty: 0 | Refills: 0 | Status: DISCONTINUED | OUTPATIENT
Start: 2019-04-03 | End: 2019-04-04

## 2019-04-03 RX ORDER — OXYTOCIN 10 UNIT/ML
1 VIAL (ML) INJECTION
Qty: 30 | Refills: 0 | Status: DISCONTINUED | OUTPATIENT
Start: 2019-04-03 | End: 2019-04-06

## 2019-04-03 RX ORDER — SODIUM CHLORIDE 9 MG/ML
1000 INJECTION, SOLUTION INTRAVENOUS ONCE
Qty: 0 | Refills: 0 | Status: DISCONTINUED | OUTPATIENT
Start: 2019-04-03 | End: 2019-04-04

## 2019-04-03 RX ORDER — FENTANYL/BUPIVACAINE/NS/PF 2MCG/ML-.1
250 PLASTIC BAG, INJECTION (ML) INJECTION
Qty: 0 | Refills: 0 | Status: DISCONTINUED | OUTPATIENT
Start: 2019-04-03 | End: 2019-04-03

## 2019-04-03 RX ADMIN — Medication 1 MILLIUNIT(S)/MIN: at 12:01

## 2019-04-03 RX ADMIN — SODIUM CHLORIDE 125 MILLILITER(S): 9 INJECTION, SOLUTION INTRAVENOUS at 12:00

## 2019-04-04 RX ORDER — SODIUM CHLORIDE 9 MG/ML
3 INJECTION INTRAMUSCULAR; INTRAVENOUS; SUBCUTANEOUS EVERY 8 HOURS
Qty: 0 | Refills: 0 | Status: DISCONTINUED | OUTPATIENT
Start: 2019-04-04 | End: 2019-04-06

## 2019-04-04 RX ORDER — OXYTOCIN 10 UNIT/ML
41.67 VIAL (ML) INJECTION
Qty: 20 | Refills: 0 | Status: DISCONTINUED | OUTPATIENT
Start: 2019-04-04 | End: 2019-04-06

## 2019-04-04 RX ORDER — DIPHENHYDRAMINE HCL 50 MG
25 CAPSULE ORAL EVERY 6 HOURS
Qty: 0 | Refills: 0 | Status: DISCONTINUED | OUTPATIENT
Start: 2019-04-04 | End: 2019-04-06

## 2019-04-04 RX ORDER — HYDROCORTISONE 1 %
1 OINTMENT (GRAM) TOPICAL EVERY 4 HOURS
Qty: 0 | Refills: 0 | Status: DISCONTINUED | OUTPATIENT
Start: 2019-04-04 | End: 2019-04-06

## 2019-04-04 RX ORDER — DOCUSATE SODIUM 100 MG
100 CAPSULE ORAL
Qty: 0 | Refills: 0 | Status: DISCONTINUED | OUTPATIENT
Start: 2019-04-04 | End: 2019-04-06

## 2019-04-04 RX ORDER — TETANUS TOXOID, REDUCED DIPHTHERIA TOXOID AND ACELLULAR PERTUSSIS VACCINE, ADSORBED 5; 2.5; 8; 8; 2.5 [IU]/.5ML; [IU]/.5ML; UG/.5ML; UG/.5ML; UG/.5ML
0.5 SUSPENSION INTRAMUSCULAR ONCE
Qty: 0 | Refills: 0 | Status: DISCONTINUED | OUTPATIENT
Start: 2019-04-04 | End: 2019-04-06

## 2019-04-04 RX ORDER — ACETAMINOPHEN 500 MG
650 TABLET ORAL EVERY 6 HOURS
Qty: 0 | Refills: 0 | Status: DISCONTINUED | OUTPATIENT
Start: 2019-04-04 | End: 2019-04-06

## 2019-04-04 RX ORDER — IBUPROFEN 200 MG
600 TABLET ORAL EVERY 6 HOURS
Qty: 0 | Refills: 0 | Status: DISCONTINUED | OUTPATIENT
Start: 2019-04-04 | End: 2019-04-06

## 2019-04-04 RX ORDER — AER TRAVELER 0.5 G/1
1 SOLUTION RECTAL; TOPICAL EVERY 4 HOURS
Qty: 0 | Refills: 0 | Status: DISCONTINUED | OUTPATIENT
Start: 2019-04-04 | End: 2019-04-06

## 2019-04-04 RX ORDER — CARBOPROST TROMETHAMINE 250 UG/ML
250 INJECTION, SOLUTION INTRAMUSCULAR ONCE
Qty: 0 | Refills: 0 | Status: COMPLETED | OUTPATIENT
Start: 2019-04-04 | End: 2019-04-04

## 2019-04-04 RX ORDER — GLYCERIN ADULT
1 SUPPOSITORY, RECTAL RECTAL AT BEDTIME
Qty: 0 | Refills: 0 | Status: DISCONTINUED | OUTPATIENT
Start: 2019-04-04 | End: 2019-04-06

## 2019-04-04 RX ORDER — SIMETHICONE 80 MG/1
80 TABLET, CHEWABLE ORAL EVERY 6 HOURS
Qty: 0 | Refills: 0 | Status: DISCONTINUED | OUTPATIENT
Start: 2019-04-04 | End: 2019-04-06

## 2019-04-04 RX ORDER — LANOLIN
1 OINTMENT (GRAM) TOPICAL EVERY 6 HOURS
Qty: 0 | Refills: 0 | Status: DISCONTINUED | OUTPATIENT
Start: 2019-04-04 | End: 2019-04-06

## 2019-04-04 RX ORDER — MAGNESIUM HYDROXIDE 400 MG/1
30 TABLET, CHEWABLE ORAL
Qty: 0 | Refills: 0 | Status: DISCONTINUED | OUTPATIENT
Start: 2019-04-04 | End: 2019-04-06

## 2019-04-04 RX ORDER — DIBUCAINE 1 %
1 OINTMENT (GRAM) RECTAL EVERY 4 HOURS
Qty: 0 | Refills: 0 | Status: DISCONTINUED | OUTPATIENT
Start: 2019-04-04 | End: 2019-04-06

## 2019-04-04 RX ORDER — PRAMOXINE HYDROCHLORIDE 150 MG/15G
1 AEROSOL, FOAM RECTAL EVERY 4 HOURS
Qty: 0 | Refills: 0 | Status: DISCONTINUED | OUTPATIENT
Start: 2019-04-04 | End: 2019-04-06

## 2019-04-04 RX ORDER — OXYCODONE AND ACETAMINOPHEN 5; 325 MG/1; MG/1
2 TABLET ORAL EVERY 6 HOURS
Qty: 0 | Refills: 0 | Status: DISCONTINUED | OUTPATIENT
Start: 2019-04-04 | End: 2019-04-06

## 2019-04-04 RX ADMIN — Medication 600 MILLIGRAM(S): at 21:39

## 2019-04-04 RX ADMIN — CARBOPROST TROMETHAMINE 250 MICROGRAM(S): 250 INJECTION, SOLUTION INTRAMUSCULAR at 02:25

## 2019-04-04 RX ADMIN — SODIUM CHLORIDE 3 MILLILITER(S): 9 INJECTION INTRAMUSCULAR; INTRAVENOUS; SUBCUTANEOUS at 06:39

## 2019-04-04 RX ADMIN — Medication 1 TABLET(S): at 15:19

## 2019-04-04 RX ADMIN — Medication 600 MILLIGRAM(S): at 16:00

## 2019-04-04 RX ADMIN — Medication 600 MILLIGRAM(S): at 15:00

## 2019-04-04 RX ADMIN — Medication 600 MILLIGRAM(S): at 21:08

## 2019-04-04 RX ADMIN — Medication 600 MILLIGRAM(S): at 09:00

## 2019-04-04 RX ADMIN — SIMETHICONE 80 MILLIGRAM(S): 80 TABLET, CHEWABLE ORAL at 21:07

## 2019-04-04 RX ADMIN — SIMETHICONE 80 MILLIGRAM(S): 80 TABLET, CHEWABLE ORAL at 15:19

## 2019-04-04 RX ADMIN — SODIUM CHLORIDE 3 MILLILITER(S): 9 INJECTION INTRAMUSCULAR; INTRAVENOUS; SUBCUTANEOUS at 14:00

## 2019-04-04 RX ADMIN — Medication 600 MILLIGRAM(S): at 10:00

## 2019-04-04 RX ADMIN — Medication 125 MILLIUNIT(S)/MIN: at 02:24

## 2019-04-04 RX ADMIN — SIMETHICONE 80 MILLIGRAM(S): 80 TABLET, CHEWABLE ORAL at 09:24

## 2019-04-04 RX ADMIN — Medication 100 MILLIGRAM(S): at 15:00

## 2019-04-05 RX ADMIN — SIMETHICONE 80 MILLIGRAM(S): 80 TABLET, CHEWABLE ORAL at 03:36

## 2019-04-05 RX ADMIN — SODIUM CHLORIDE 3 MILLILITER(S): 9 INJECTION INTRAMUSCULAR; INTRAVENOUS; SUBCUTANEOUS at 22:09

## 2019-04-05 RX ADMIN — Medication 600 MILLIGRAM(S): at 13:52

## 2019-04-05 RX ADMIN — Medication 600 MILLIGRAM(S): at 13:29

## 2019-04-05 RX ADMIN — Medication 600 MILLIGRAM(S): at 03:34

## 2019-04-05 RX ADMIN — Medication 1 TABLET(S): at 13:29

## 2019-04-05 RX ADMIN — Medication 100 MILLIGRAM(S): at 13:29

## 2019-04-05 RX ADMIN — Medication 600 MILLIGRAM(S): at 19:53

## 2019-04-05 RX ADMIN — Medication 600 MILLIGRAM(S): at 19:00

## 2019-04-05 NOTE — PROGRESS NOTE ADULT - SUBJECTIVE AND OBJECTIVE BOX
Patient evaluated at bedside.     She reports pain is well controlled.    She has been ambulating without assistance, voiding spontaneously, and is breastfeeding.    She denies HA, dizziness, chest pain, palpitations, shortness of breath, n/v, heavy vaginal bleeding or perineal discomfort.    Physical Exam:  Vital Signs Last 24 Hrs  T(C): 36.7 (05 Apr 2019 06:37), Max: 36.8 (04 Apr 2019 18:04)  T(F): 98.1 (05 Apr 2019 06:37), Max: 98.3 (04 Apr 2019 18:04)  HR: 79 (05 Apr 2019 06:37) (76 - 83)  BP: 90/54 (05 Apr 2019 06:37) (90/54 - 108/73)  BP(mean): --  RR: 17 (05 Apr 2019 06:37) (17 - 18)  SpO2: 96% (05 Apr 2019 06:37) (96% - 97%)    GA: NAD, A+0 x 3  Abd: + BS, soft, nontender, nondistended, no rebound or guarding, uterus firm at midline  : lochia WNL  Extremities: no edema or calf tenderness                          13.2   11.09 )-----------( 196      ( 03 Apr 2019 11:01 )             39.4

## 2019-04-05 NOTE — PROGRESS NOTE ADULT - ASSESSMENT
A/P yo 38y  s/p , PP#1 , stable  1.   -pt asymptomatic  1. Pain: OPM  2. GI: Reg  3. :  Voiding  4. DVT prophylaxis: SCDs, ambulation  5. Dispo: PP#2

## 2019-04-05 NOTE — LACTATION INITIAL EVALUATION - PRO BREASTFEED PREV EXP YN OB
yes/first child now 21yrs. old- 6-8 months, 2nd child now 17 yrs.old-4-5 months, third child 15 yrs. old- 3 months

## 2019-04-06 ENCOUNTER — TRANSCRIPTION ENCOUNTER (OUTPATIENT)
Age: 39
End: 2019-04-06

## 2019-04-06 VITALS
TEMPERATURE: 98 F | DIASTOLIC BLOOD PRESSURE: 85 MMHG | HEART RATE: 74 BPM | RESPIRATION RATE: 16 BRPM | OXYGEN SATURATION: 98 % | SYSTOLIC BLOOD PRESSURE: 127 MMHG

## 2019-04-06 PROCEDURE — 86850 RBC ANTIBODY SCREEN: CPT

## 2019-04-06 PROCEDURE — 86901 BLOOD TYPING SEROLOGIC RH(D): CPT

## 2019-04-06 PROCEDURE — 86900 BLOOD TYPING SEROLOGIC ABO: CPT

## 2019-04-06 PROCEDURE — 85025 COMPLETE CBC W/AUTO DIFF WBC: CPT

## 2019-04-06 PROCEDURE — 86780 TREPONEMA PALLIDUM: CPT

## 2019-04-06 PROCEDURE — 36415 COLL VENOUS BLD VENIPUNCTURE: CPT

## 2019-04-06 RX ADMIN — SODIUM CHLORIDE 3 MILLILITER(S): 9 INJECTION INTRAMUSCULAR; INTRAVENOUS; SUBCUTANEOUS at 07:24

## 2019-04-06 RX ADMIN — Medication 1 APPLICATION(S): at 00:11

## 2019-04-06 RX ADMIN — Medication 1 TABLET(S): at 12:11

## 2019-04-06 RX ADMIN — Medication 100 MILLIGRAM(S): at 12:11

## 2019-04-06 RX ADMIN — Medication 100 MILLIGRAM(S): at 00:12

## 2019-04-06 NOTE — PROGRESS NOTE ADULT - ASSESSMENT
A/P yo 38y  s/p , PP#2 c/b PPH,  s/p hemabate and cytotec 1000mcg, stable  VSS, asymptomatic  1. Pain: OPM  2. GI: Reg  3. :  Voiding  4. DVT prophylaxis: SCDs, ambulation  5. Dispo: PP#2

## 2019-04-06 NOTE — DISCHARGE NOTE OB - PLAN OF CARE
Healthy mom and healthy baby 38 year old female s/p vaginal delivery, postpartum day 2, meeting all postpartum milestones. Pelvic rest until cleared. Follow-up with obstetrician in 6 weeks.

## 2019-04-06 NOTE — DISCHARGE NOTE OB - PATIENT PORTAL LINK FT
You can access the [facility] Patient Portal, offered by [facility], by registering with the following website: [email or web address]

## 2019-04-06 NOTE — DISCHARGE NOTE OB - CARE PROVIDER_API CALL
Sherrie Sheppard)  Obstetrics and Gynecology  [address] [place] [address]  Phone: [phone]  Fax: [phone]  Follow Up Time:

## 2019-04-06 NOTE — DISCHARGE NOTE OB - CARE PLAN
Principal Discharge DX:	Postpartum state  Goal:	Healthy mom and healthy baby  Assessment and plan of treatment:	38 year old female s/p vaginal delivery, postpartum day 2, meeting all postpartum milestones. Pelvic rest until cleared. Follow-up with obstetrician in 6 weeks.

## 2019-04-06 NOTE — PROGRESS NOTE ADULT - SUBJECTIVE AND OBJECTIVE BOX
Patient evaluated at bedside.   She reports pain is well controlled.    She has been ambulating without assistance, voiding spontaneously, and is breastfeeding.    She denies HA, dizziness, chest pain, palpitations, shortness of breathe, n/v, heavy vaginal bleeding or perineal discomfort.    Physical Exam:  Vital Signs Last 24 Hrs  T(C): 36.7 (06 Apr 2019 06:00), Max: 36.7 (05 Apr 2019 10:00)  T(F): 98 (06 Apr 2019 06:00), Max: 98 (05 Apr 2019 10:00)  HR: 69 (06 Apr 2019 06:00) (69 - 75)  BP: 102/69 (06 Apr 2019 06:00) (101/66 - 105/70)  BP(mean): --  RR: 16 (06 Apr 2019 06:00) (16 - 16)  SpO2: 96% (06 Apr 2019 06:00) (96% - 99%)    GA: NAD, A+0 x 3  Abd: + BS, soft, nontender, nondistended, no rebound or guarding, uterus firm at midline  : lochia WNL  Extremities: no swelling or calf tenderness          MEDICATIONS  (STANDING):  diphtheria/tetanus/pertussis (acellular) Vaccine (ADAcel) 0.5 milliLiter(s) IntraMuscular once  oxytocin Infusion 41.667 milliUNIT(s)/Min (125 mL/Hr) IV Continuous <Continuous>  oxytocin Infusion 1 milliUNIT(s)/Min (1 mL/Hr) IV Continuous <Continuous>  prenatal multivitamin 1 Tablet(s) Oral daily  sodium chloride 0.9% lock flush 3 milliLiter(s) IV Push every 8 hours    MEDICATIONS  (PRN):  acetaminophen   Tablet .. 650 milliGRAM(s) Oral every 6 hours PRN Temp greater or equal to 38.5C (101.3F), Mild Pain (1 - 3)  dibucaine 1% Ointment 1 Application(s) Topical every 4 hours PRN Perineal Discomfort  diphenhydrAMINE 25 milliGRAM(s) Oral every 6 hours PRN Itching  docusate sodium 100 milliGRAM(s) Oral two times a day PRN Stool Softening  glycerin Suppository - Adult 1 Suppository(s) Rectal at bedtime PRN Constipation  hydrocortisone 1% Cream 1 Application(s) Topical every 4 hours PRN Moderate to Severe Perineal Pain  ibuprofen  Tablet. 600 milliGRAM(s) Oral every 6 hours PRN Moderate Pain (4 - 6)  lanolin Ointment 1 Application(s) Topical every 6 hours PRN Sore Nipples  magnesium hydroxide Suspension 30 milliLiter(s) Oral two times a day PRN Constipation  oxyCODONE    5 mG/acetaminophen 325 mG 2 Tablet(s) Oral every 6 hours PRN Severe Pain (7 - 10)  pramoxine 1%/zinc 5% Cream 1 Application(s) Topical every 4 hours PRN Moderate to Severe Perineal Pain  simethicone 80 milliGRAM(s) Chew every 6 hours PRN Gas  witch hazel Pads 1 Application(s) Topical every 4 hours PRN Perineal Discomfort

## 2019-04-09 DIAGNOSIS — Z34.03 ENCOUNTER FOR SUPERVISION OF NORMAL FIRST PREGNANCY, THIRD TRIMESTER: ICD-10-CM

## 2019-04-09 DIAGNOSIS — Z3A.39 39 WEEKS GESTATION OF PREGNANCY: ICD-10-CM

## 2020-04-18 ENCOUNTER — TRANSCRIPTION ENCOUNTER (OUTPATIENT)
Age: 40
End: 2020-04-18

## 2020-08-13 ENCOUNTER — APPOINTMENT (OUTPATIENT)
Dept: HEART AND VASCULAR | Facility: CLINIC | Age: 40
End: 2020-08-13
Payer: COMMERCIAL

## 2020-08-13 ENCOUNTER — NON-APPOINTMENT (OUTPATIENT)
Age: 40
End: 2020-08-13

## 2020-08-13 VITALS
SYSTOLIC BLOOD PRESSURE: 108 MMHG | DIASTOLIC BLOOD PRESSURE: 64 MMHG | HEIGHT: 62 IN | BODY MASS INDEX: 22.45 KG/M2 | WEIGHT: 122 LBS | OXYGEN SATURATION: 100 % | HEART RATE: 76 BPM

## 2020-08-13 PROCEDURE — 99204 OFFICE O/P NEW MOD 45 MIN: CPT

## 2020-08-13 PROCEDURE — 93000 ELECTROCARDIOGRAM COMPLETE: CPT

## 2020-08-13 RX ORDER — AMOXICILLIN AND CLAVULANATE POTASSIUM 875; 125 MG/1; MG/1
875-125 TABLET, COATED ORAL
Qty: 20 | Refills: 0 | Status: COMPLETED | COMMUNITY
Start: 2017-08-18 | End: 2020-08-13

## 2020-08-13 RX ORDER — OMEPRAZOLE MAGNESIUM 20 MG/1
20 TABLET, DELAYED RELEASE ORAL DAILY
Qty: 30 | Refills: 0 | Status: COMPLETED | COMMUNITY
Start: 2017-08-18 | End: 2020-08-13

## 2020-08-13 RX ORDER — CIPROFLOXACIN HYDROCHLORIDE 250 MG/1
250 TABLET, FILM COATED ORAL
Qty: 6 | Refills: 0 | Status: COMPLETED | COMMUNITY
Start: 2017-03-20 | End: 2020-08-13

## 2020-08-13 RX ORDER — DICLOFENAC SODIUM 75 MG/1
75 TABLET, DELAYED RELEASE ORAL
Qty: 30 | Refills: 0 | Status: COMPLETED | COMMUNITY
Start: 2017-08-18 | End: 2020-08-13

## 2020-08-13 NOTE — REASON FOR VISIT
[Initial Evaluation] : an initial evaluation of [Chest Pain] : chest pain [Dizziness] : dizziness [FreeTextEntry1] : 39 year old female presents for evaluation. She remarked on a episode of irregular/unusual heartbeat with associated chest discomfort and dizziness. Symptoms noted at rest. She remarks on feeling her usual self prior. She felt dizzy subsequently. No syncope noted. She remarked on midsternal discomfort, radiated to jaw and left arm. We are discussing a cardiac work up for the above complains as one has not been done recently. Her EKG at baseline has t wave changes and ST-T depressions on inferior leads. \par

## 2020-08-13 NOTE — DISCUSSION/SUMMARY
[FreeTextEntry1] : CP/abn ekg ANTONY and I had a discussion of his symptoms. Her risk for CAD falls intro intermediate. We will therefore move forward with a stress echo at this time to evaluate for obstructive CAD. Risks and benefits discussed. Will order labs and have her see primary care. \par

## 2020-08-13 NOTE — PHYSICAL EXAM
[General Appearance - Well Developed] : well developed [Normal Appearance] : normal appearance [General Appearance - Well Nourished] : well nourished [No Deformities] : no deformities [Well Groomed] : well groomed [General Appearance - In No Acute Distress] : no acute distress [Normal Conjunctiva] : the conjunctiva exhibited no abnormalities [Eyelids - No Xanthelasma] : the eyelids demonstrated no xanthelasmas [No Oral Pallor] : no oral pallor [Normal Oral Mucosa] : normal oral mucosa [Normal Jugular Venous A Waves Present] : normal jugular venous A waves present [No Oral Cyanosis] : no oral cyanosis [Normal Jugular Venous V Waves Present] : normal jugular venous V waves present [No Jugular Venous Gupta A Waves] : no jugular venous gupta A waves [Heart Sounds] : normal S1 and S2 [Heart Rate And Rhythm] : heart rate and rhythm were normal [Murmurs] : no murmurs present [Exaggerated Use Of Accessory Muscles For Inspiration] : no accessory muscle use [Respiration, Rhythm And Depth] : normal respiratory rhythm and effort [Abdomen Soft] : soft [Auscultation Breath Sounds / Voice Sounds] : lungs were clear to auscultation bilaterally [Abdomen Tenderness] : non-tender [Abdomen Mass (___ Cm)] : no abdominal mass palpated [Gait - Sufficient For Exercise Testing] : the gait was sufficient for exercise testing [Abnormal Walk] : normal gait [Cyanosis, Localized] : no localized cyanosis [Nail Clubbing] : no clubbing of the fingernails [Petechial Hemorrhages (___cm)] : no petechial hemorrhages [] : no rash [Skin Color & Pigmentation] : normal skin color and pigmentation [Skin Lesions] : no skin lesions [No Venous Stasis] : no venous stasis [No Skin Ulcers] : no skin ulcer [No Xanthoma] : no  xanthoma was observed [Oriented To Time, Place, And Person] : oriented to person, place, and time [Affect] : the affect was normal [Mood] : the mood was normal [No Anxiety] : not feeling anxious

## 2020-08-25 ENCOUNTER — APPOINTMENT (OUTPATIENT)
Dept: HEART AND VASCULAR | Facility: CLINIC | Age: 40
End: 2020-08-25
Payer: COMMERCIAL

## 2020-08-25 ENCOUNTER — TRANSCRIPTION ENCOUNTER (OUTPATIENT)
Age: 40
End: 2020-08-25

## 2020-08-25 PROCEDURE — 36415 COLL VENOUS BLD VENIPUNCTURE: CPT

## 2020-08-26 ENCOUNTER — APPOINTMENT (OUTPATIENT)
Dept: HEART AND VASCULAR | Facility: CLINIC | Age: 40
End: 2020-08-26
Payer: COMMERCIAL

## 2020-08-26 LAB
25(OH)D3 SERPL-MCNC: 25.3 NG/ML
ALBUMIN SERPL ELPH-MCNC: 4.5 G/DL
ALP BLD-CCNC: 54 U/L
ALT SERPL-CCNC: 13 U/L
ANION GAP SERPL CALC-SCNC: 10 MMOL/L
AST SERPL-CCNC: 19 U/L
BASOPHILS # BLD AUTO: 0.05 K/UL
BASOPHILS NFR BLD AUTO: 1.1 %
BILIRUB SERPL-MCNC: 0.5 MG/DL
BUN SERPL-MCNC: 17 MG/DL
CALCIUM SERPL-MCNC: 9.1 MG/DL
CHLORIDE SERPL-SCNC: 104 MMOL/L
CHOLEST SERPL-MCNC: 187 MG/DL
CHOLEST/HDLC SERPL: 2.8 RATIO
CO2 SERPL-SCNC: 24 MMOL/L
CREAT SERPL-MCNC: 0.88 MG/DL
EOSINOPHIL # BLD AUTO: 0.33 K/UL
EOSINOPHIL NFR BLD AUTO: 6.9 %
ESTIMATED AVERAGE GLUCOSE: 105 MG/DL
GLUCOSE SERPL-MCNC: 80 MG/DL
HBA1C MFR BLD HPLC: 5.3 %
HCT VFR BLD CALC: 40.3 %
HDLC SERPL-MCNC: 67 MG/DL
HGB BLD-MCNC: 12.2 G/DL
IMM GRANULOCYTES NFR BLD AUTO: 0.2 %
LDLC SERPL CALC-MCNC: 110 MG/DL
LYMPHOCYTES # BLD AUTO: 1.75 K/UL
LYMPHOCYTES NFR BLD AUTO: 36.8 %
MAGNESIUM SERPL-MCNC: 2 MG/DL
MAN DIFF?: NORMAL
MCHC RBC-ENTMCNC: 27.9 PG
MCHC RBC-ENTMCNC: 30.3 GM/DL
MCV RBC AUTO: 92 FL
MONOCYTES # BLD AUTO: 0.42 K/UL
MONOCYTES NFR BLD AUTO: 8.8 %
NEUTROPHILS # BLD AUTO: 2.19 K/UL
NEUTROPHILS NFR BLD AUTO: 46.2 %
PLATELET # BLD AUTO: 248 K/UL
POTASSIUM SERPL-SCNC: 4.7 MMOL/L
PROT SERPL-MCNC: 7 G/DL
RBC # BLD: 4.38 M/UL
RBC # FLD: 14.8 %
SARS-COV-2 IGG SERPL IA-ACNC: 0.09 INDEX
SARS-COV-2 IGG SERPL QL IA: NEGATIVE
SODIUM SERPL-SCNC: 138 MMOL/L
T3FREE SERPL-MCNC: 2.45 PG/ML
T4 FREE SERPL-MCNC: 1.1 NG/DL
TRIGL SERPL-MCNC: 51 MG/DL
TSH SERPL-ACNC: 1.33 UIU/ML
VIT B12 SERPL-MCNC: 746 PG/ML
WBC # FLD AUTO: 4.75 K/UL

## 2020-08-26 PROCEDURE — 99441: CPT

## 2020-08-31 ENCOUNTER — APPOINTMENT (OUTPATIENT)
Dept: HEART AND VASCULAR | Facility: CLINIC | Age: 40
End: 2020-08-31

## 2020-08-31 ENCOUNTER — APPOINTMENT (OUTPATIENT)
Dept: HEART AND VASCULAR | Facility: CLINIC | Age: 40
End: 2020-08-31
Payer: COMMERCIAL

## 2020-08-31 DIAGNOSIS — R94.31 ABNORMAL ELECTROCARDIOGRAM [ECG] [EKG]: ICD-10-CM

## 2020-08-31 DIAGNOSIS — R07.89 OTHER CHEST PAIN: ICD-10-CM

## 2020-08-31 PROCEDURE — 99214 OFFICE O/P EST MOD 30 MIN: CPT

## 2020-08-31 PROCEDURE — 93351 STRESS TTE COMPLETE: CPT

## 2020-09-01 PROBLEM — R07.89 CHEST PAIN, ATYPICAL: Status: ACTIVE | Noted: 2020-08-13

## 2020-09-01 PROBLEM — R94.31 ABNORMAL ECG: Status: ACTIVE | Noted: 2020-08-13

## 2020-09-01 NOTE — REASON FOR VISIT
[Follow-Up - Clinic] : a clinic follow-up of [Palpitations] : palpitations [FreeTextEntry1] : 39 year old female presents for evaluation. She remarked on a episode of irregular/unusual heartbeat with associated chest discomfort and dizziness. Symptoms noted at rest. She remarks on feeling her usual self prior. She felt dizzy subsequently. No syncope noted. She remarked on midsternal discomfort, radiated to jaw and left arm. We are discussing a cardiac work up for the above complains as one has not been done recently. Her EKG at baseline has t wave changes and ST-T depressions on inferior leads. She completed a stress echo and labs and we are discussing \par

## 2020-09-01 NOTE — DISCUSSION/SUMMARY
[FreeTextEntry1] : CP/Palps Inclined towards a conservative follow up in this patient. We had a careful discussion regarding diet and exercise. Will be happy to re-evaluate.\par

## 2020-09-01 NOTE — PHYSICAL EXAM
[General Appearance - Well Developed] : well developed [Normal Appearance] : normal appearance [Well Groomed] : well groomed [General Appearance - Well Nourished] : well nourished [No Deformities] : no deformities [General Appearance - In No Acute Distress] : no acute distress [Normal Conjunctiva] : the conjunctiva exhibited no abnormalities [Eyelids - No Xanthelasma] : the eyelids demonstrated no xanthelasmas [Normal Oral Mucosa] : normal oral mucosa [No Oral Pallor] : no oral pallor [No Oral Cyanosis] : no oral cyanosis [Normal Jugular Venous A Waves Present] : normal jugular venous A waves present [Normal Jugular Venous V Waves Present] : normal jugular venous V waves present [No Jugular Venous Gupta A Waves] : no jugular venous gupta A waves [Respiration, Rhythm And Depth] : normal respiratory rhythm and effort [Exaggerated Use Of Accessory Muscles For Inspiration] : no accessory muscle use [Auscultation Breath Sounds / Voice Sounds] : lungs were clear to auscultation bilaterally [Heart Rate And Rhythm] : heart rate and rhythm were normal [Heart Sounds] : normal S1 and S2 [Murmurs] : no murmurs present [Abdomen Soft] : soft [Abdomen Tenderness] : non-tender [Abdomen Mass (___ Cm)] : no abdominal mass palpated [Abnormal Walk] : normal gait [Gait - Sufficient For Exercise Testing] : the gait was sufficient for exercise testing [Nail Clubbing] : no clubbing of the fingernails [Cyanosis, Localized] : no localized cyanosis [Petechial Hemorrhages (___cm)] : no petechial hemorrhages [Skin Color & Pigmentation] : normal skin color and pigmentation [] : no rash [No Venous Stasis] : no venous stasis [Skin Lesions] : no skin lesions [No Skin Ulcers] : no skin ulcer [No Xanthoma] : no  xanthoma was observed [Oriented To Time, Place, And Person] : oriented to person, place, and time [Affect] : the affect was normal [Mood] : the mood was normal [No Anxiety] : not feeling anxious

## 2021-11-21 ENCOUNTER — TRANSCRIPTION ENCOUNTER (OUTPATIENT)
Age: 41
End: 2021-11-21

## 2022-04-25 ENCOUNTER — APPOINTMENT (OUTPATIENT)
Dept: DERMATOLOGY | Facility: CLINIC | Age: 42
End: 2022-04-25

## 2022-05-13 ENCOUNTER — APPOINTMENT (OUTPATIENT)
Dept: NEUROLOGY | Facility: CLINIC | Age: 42
End: 2022-05-13
Payer: COMMERCIAL

## 2022-05-13 ENCOUNTER — NON-APPOINTMENT (OUTPATIENT)
Age: 42
End: 2022-05-13

## 2022-05-13 VITALS
WEIGHT: 114 LBS | DIASTOLIC BLOOD PRESSURE: 66 MMHG | BODY MASS INDEX: 20.98 KG/M2 | HEIGHT: 62 IN | OXYGEN SATURATION: 99 % | TEMPERATURE: 98.5 F | HEART RATE: 73 BPM | SYSTOLIC BLOOD PRESSURE: 104 MMHG

## 2022-05-13 DIAGNOSIS — I63.449 CEREBRAL INFARCTION DUE TO EMBOLISM OF UNSPECIFIED CEREBELLAR ARTERY: ICD-10-CM

## 2022-05-13 PROCEDURE — 99205 OFFICE O/P NEW HI 60 MIN: CPT

## 2022-05-19 ENCOUNTER — FORM ENCOUNTER (OUTPATIENT)
Age: 42
End: 2022-05-19

## 2022-05-23 ENCOUNTER — LABORATORY RESULT (OUTPATIENT)
Age: 42
End: 2022-05-23

## 2022-05-23 ENCOUNTER — APPOINTMENT (OUTPATIENT)
Dept: HEMATOLOGY ONCOLOGY | Facility: CLINIC | Age: 42
End: 2022-05-23
Payer: COMMERCIAL

## 2022-05-23 VITALS
HEART RATE: 65 BPM | TEMPERATURE: 97.4 F | WEIGHT: 113 LBS | BODY MASS INDEX: 20.8 KG/M2 | DIASTOLIC BLOOD PRESSURE: 70 MMHG | HEIGHT: 62 IN | OXYGEN SATURATION: 97 % | RESPIRATION RATE: 16 BRPM | SYSTOLIC BLOOD PRESSURE: 118 MMHG

## 2022-05-23 DIAGNOSIS — R79.89 OTHER SPECIFIED ABNORMAL FINDINGS OF BLOOD CHEMISTRY: ICD-10-CM

## 2022-05-23 DIAGNOSIS — U07.1 COVID-19: ICD-10-CM

## 2022-05-23 PROCEDURE — 99204 OFFICE O/P NEW MOD 45 MIN: CPT | Mod: 25

## 2022-05-24 ENCOUNTER — TRANSCRIPTION ENCOUNTER (OUTPATIENT)
Age: 42
End: 2022-05-24

## 2022-05-24 LAB
25(OH)D3 SERPL-MCNC: 26.1 NG/ML
BASOPHILS # BLD AUTO: 0 K/UL
BASOPHILS NFR BLD AUTO: 0 %
EOSINOPHIL # BLD AUTO: 0.11 K/UL
EOSINOPHIL NFR BLD AUTO: 1.7 %
ERYTHROCYTE [SEDIMENTATION RATE] IN BLOOD BY WESTERGREN METHOD: 15 MM/HR
FERRITIN SERPL-MCNC: 120 NG/ML
HAPTOGLOB SERPL-MCNC: 122 MG/DL
HCT VFR BLD CALC: 34.3 %
HGB BLD-MCNC: 10.4 G/DL
IRON SATN MFR SERPL: 19 %
IRON SERPL-MCNC: 64 UG/DL
LDH SERPL-CCNC: 158 U/L
LYMPHOCYTES # BLD AUTO: 1.64 K/UL
LYMPHOCYTES NFR BLD AUTO: 25.9 %
MAN DIFF?: NORMAL
MCHC RBC-ENTMCNC: 24.8 PG
MCHC RBC-ENTMCNC: 30.3 GM/DL
MCV RBC AUTO: 81.9 FL
MONOCYTES # BLD AUTO: 0.38 K/UL
MONOCYTES NFR BLD AUTO: 6 %
NEUTROPHILS # BLD AUTO: 4.2 K/UL
NEUTROPHILS NFR BLD AUTO: 66.4 %
PLATELET # BLD AUTO: 291 K/UL
RBC # BLD: 4.19 M/UL
RBC # FLD: 23 %
TIBC SERPL-MCNC: 343 UG/DL
UIBC SERPL-MCNC: 279 UG/DL
WBC # FLD AUTO: 6.33 K/UL

## 2022-05-26 LAB — ANACR T: NEGATIVE

## 2022-05-30 PROBLEM — I63.449 CEREBROVASCULAR ACCIDENT (CVA) DUE TO EMBOLISM OF CEREBELLAR ARTERY, UNSPECIFIED BLOOD VESSEL LATERALITY: Status: RESOLVED | Noted: 2022-05-30 | Resolved: 2022-05-30

## 2022-05-30 NOTE — ASSESSMENT
[FreeTextEntry1] : Check APL ab Hypercoag testing\par KRISTIE\par Loop\par To see Dr. Paul\par Mediterranean diet\par Exercise

## 2022-05-30 NOTE — HISTORY OF PRESENT ILLNESS
[FreeTextEntry1] : May 3rd 12 [place]\par left facial droop\par slurred speech. \par \par got to ed in 20 minutes at [employer]. almost back to normal. \par Her symptoms resolved within 2 hours. However patient feels that her left side of her face felt slightly different. \par \par MRI shows a right punctate distal parietal stroke. \par \par h/o migraines - she had a headache that morning. Took Ubrelvy that day. \par \par miscarriages diagnosed as vestibular migraine -by Abran. No aura besides vertigo. \par \par \par Patient had a couple of first trimester miscarriages and her mother had several miscarriages. \par \par Covid diagnosis - on incidental checking. Fever a day later\par patient has been vaccinated and boosted\par \par

## 2022-05-31 ENCOUNTER — TRANSCRIPTION ENCOUNTER (OUTPATIENT)
Age: 42
End: 2022-05-31

## 2022-06-01 ENCOUNTER — TRANSCRIPTION ENCOUNTER (OUTPATIENT)
Age: 42
End: 2022-06-01

## 2022-06-15 ENCOUNTER — APPOINTMENT (OUTPATIENT)
Dept: NEUROLOGY | Facility: CLINIC | Age: 42
End: 2022-06-15
Payer: COMMERCIAL

## 2022-06-15 VITALS
TEMPERATURE: 98.9 F | WEIGHT: 113 LBS | DIASTOLIC BLOOD PRESSURE: 82 MMHG | HEIGHT: 62 IN | BODY MASS INDEX: 20.8 KG/M2 | HEART RATE: 105 BPM | OXYGEN SATURATION: 100 % | SYSTOLIC BLOOD PRESSURE: 132 MMHG

## 2022-06-15 PROCEDURE — 99213 OFFICE O/P EST LOW 20 MIN: CPT

## 2022-06-15 NOTE — HISTORY OF PRESENT ILLNESS
[FreeTextEntry1] : Patient to get PFO closed on July 1st. \par [place] a [place] this [place] and [place] drive 3 hours to get to her vacation destination. On the way back she will do the same. \par \par \par \par May 3rd 12 [place]\par left facial droop\par slurred speech. \par \par got to ed in 20 minutes at [employer]. almost back to normal. \par Her symptoms resolved within 2 hours. However patient feels that her left side of her face felt slightly different. \par \par MRI shows a right punctate distal parietal stroke. \par \par h/o migraines - she had a headache that morning. Took Ubrelvy that day. \par \par miscarriages diagnosed as vestibular migraine -by Abran. No aura besides vertigo. \par \par \par Patient had a couple of first trimester miscarriages and her mother had several miscarriages. \par \par Covid diagnosis - on incidental checking. Fever a day later\par patient has been vaccinated and boosted\par \par

## 2022-06-15 NOTE — ASSESSMENT
[FreeTextEntry1] : Patient will get PFO closure. \par \par Use eliquis 5 mg bid on day of travel and day of driving on the way there and back. stop dapt on the days that she is using eliquis and restart when she stops the eliquis. \par Patient verbalized understanding of this.

## 2022-06-19 ENCOUNTER — RESULT REVIEW (OUTPATIENT)
Age: 42
End: 2022-06-19

## 2022-06-19 ENCOUNTER — TRANSCRIPTION ENCOUNTER (OUTPATIENT)
Age: 42
End: 2022-06-19

## 2022-06-19 ENCOUNTER — INPATIENT (INPATIENT)
Facility: HOSPITAL | Age: 42
LOS: 1 days | Discharge: HOME | DRG: 742 | End: 2022-06-21
Attending: OBSTETRICS & GYNECOLOGY | Admitting: OBSTETRICS & GYNECOLOGY
Payer: COMMERCIAL

## 2022-06-19 VITALS
DIASTOLIC BLOOD PRESSURE: 63 MMHG | HEIGHT: 62 IN | SYSTOLIC BLOOD PRESSURE: 90 MMHG | RESPIRATION RATE: 16 BRPM | HEART RATE: 82 BPM | OXYGEN SATURATION: 96 % | TEMPERATURE: 99 F | WEIGHT: 110.01 LBS

## 2022-06-19 DIAGNOSIS — M25.569 PAIN IN UNSPECIFIED KNEE: Chronic | ICD-10-CM

## 2022-06-19 LAB
ALBUMIN SERPL ELPH-MCNC: 2.5 G/DL — LOW (ref 3.3–5)
ALP SERPL-CCNC: 29 U/L — LOW (ref 40–120)
ALT FLD-CCNC: 8 U/L — LOW (ref 10–45)
ANION GAP SERPL CALC-SCNC: 9 MMOL/L — SIGNIFICANT CHANGE UP (ref 5–17)
ANISOCYTOSIS BLD QL: SLIGHT — SIGNIFICANT CHANGE UP
APTT BLD: 31.5 SEC — SIGNIFICANT CHANGE UP (ref 27.5–35.5)
APTT BLD: 33.7 SEC — SIGNIFICANT CHANGE UP (ref 27.5–35.5)
AST SERPL-CCNC: 10 U/L — SIGNIFICANT CHANGE UP (ref 10–40)
BASOPHILS # BLD AUTO: 0.09 K/UL — SIGNIFICANT CHANGE UP (ref 0–0.2)
BASOPHILS NFR BLD AUTO: 1.8 % — SIGNIFICANT CHANGE UP (ref 0–2)
BILIRUB SERPL-MCNC: 0.3 MG/DL — SIGNIFICANT CHANGE UP (ref 0.2–1.2)
BLD GP AB SCN SERPL QL: NEGATIVE — SIGNIFICANT CHANGE UP
BLD GP AB SCN SERPL QL: NEGATIVE — SIGNIFICANT CHANGE UP
BUN SERPL-MCNC: 8 MG/DL — SIGNIFICANT CHANGE UP (ref 7–23)
BUN SERPL-MCNC: 9 MG/DL — SIGNIFICANT CHANGE UP (ref 7–23)
CALCIUM SERPL-MCNC: 6.5 MG/DL — CRITICAL LOW (ref 8.4–10.5)
CALCIUM SERPL-MCNC: 7.3 MG/DL — LOW (ref 8.4–10.5)
CHLORIDE SERPL-SCNC: 108 MMOL/L — SIGNIFICANT CHANGE UP (ref 96–108)
CHLORIDE SERPL-SCNC: 114 MMOL/L — HIGH (ref 96–108)
CO2 SERPL-SCNC: 17 MMOL/L — LOW (ref 22–31)
CO2 SERPL-SCNC: 20 MMOL/L — LOW (ref 22–31)
CREAT SERPL-MCNC: 0.68 MG/DL — SIGNIFICANT CHANGE UP (ref 0.5–1.3)
CREAT SERPL-MCNC: 0.72 MG/DL — SIGNIFICANT CHANGE UP (ref 0.5–1.3)
EGFR: 107 ML/MIN/1.73M2 — SIGNIFICANT CHANGE UP
EGFR: 111 ML/MIN/1.73M2 — SIGNIFICANT CHANGE UP
ELLIPTOCYTES BLD QL SMEAR: SLIGHT — SIGNIFICANT CHANGE UP
EOSINOPHIL # BLD AUTO: 0 K/UL — SIGNIFICANT CHANGE UP (ref 0–0.5)
EOSINOPHIL NFR BLD AUTO: 0 % — SIGNIFICANT CHANGE UP (ref 0–6)
FIBRINOGEN PPP-MCNC: 142 MG/DL — LOW (ref 258–438)
GLUCOSE BLDC GLUCOMTR-MCNC: 136 MG/DL — HIGH (ref 70–99)
GLUCOSE SERPL-MCNC: 111 MG/DL — HIGH (ref 70–99)
GLUCOSE SERPL-MCNC: 115 MG/DL — HIGH (ref 70–99)
HCT VFR BLD CALC: 16.4 % — CRITICAL LOW (ref 34.5–45)
HCT VFR BLD CALC: 21 % — CRITICAL LOW (ref 34.5–45)
HGB BLD-MCNC: 5.1 G/DL — CRITICAL LOW (ref 11.5–15.5)
HGB BLD-MCNC: 6.6 G/DL — CRITICAL LOW (ref 11.5–15.5)
HYPOCHROMIA BLD QL: SLIGHT — SIGNIFICANT CHANGE UP
INR BLD: 1.23 — HIGH (ref 0.88–1.16)
LACTATE SERPL-SCNC: 1.4 MMOL/L — SIGNIFICANT CHANGE UP (ref 0.5–2)
LYMPHOCYTES # BLD AUTO: 0.75 K/UL — LOW (ref 1–3.3)
LYMPHOCYTES # BLD AUTO: 14.9 % — SIGNIFICANT CHANGE UP (ref 13–44)
MACROCYTES BLD QL: SLIGHT — SIGNIFICANT CHANGE UP
MANUAL SMEAR VERIFICATION: SIGNIFICANT CHANGE UP
MCHC RBC-ENTMCNC: 27.6 PG — SIGNIFICANT CHANGE UP (ref 27–34)
MCHC RBC-ENTMCNC: 27.8 PG — SIGNIFICANT CHANGE UP (ref 27–34)
MCHC RBC-ENTMCNC: 31.1 GM/DL — LOW (ref 32–36)
MCHC RBC-ENTMCNC: 31.4 GM/DL — LOW (ref 32–36)
MCV RBC AUTO: 88.6 FL — SIGNIFICANT CHANGE UP (ref 80–100)
MCV RBC AUTO: 88.6 FL — SIGNIFICANT CHANGE UP (ref 80–100)
MONOCYTES # BLD AUTO: 0.13 K/UL — SIGNIFICANT CHANGE UP (ref 0–0.9)
MONOCYTES NFR BLD AUTO: 2.6 % — SIGNIFICANT CHANGE UP (ref 2–14)
NEUTROPHILS # BLD AUTO: 4.05 K/UL — SIGNIFICANT CHANGE UP (ref 1.8–7.4)
NEUTROPHILS NFR BLD AUTO: 80.7 % — HIGH (ref 43–77)
NRBC # BLD: 0 /100 WBCS — SIGNIFICANT CHANGE UP (ref 0–0)
OVALOCYTES BLD QL SMEAR: SLIGHT — SIGNIFICANT CHANGE UP
PLAT MORPH BLD: NORMAL — SIGNIFICANT CHANGE UP
PLATELET # BLD AUTO: 144 K/UL — LOW (ref 150–400)
PLATELET # BLD AUTO: 179 K/UL — SIGNIFICANT CHANGE UP (ref 150–400)
POIKILOCYTOSIS BLD QL AUTO: SLIGHT — SIGNIFICANT CHANGE UP
POLYCHROMASIA BLD QL SMEAR: SLIGHT — SIGNIFICANT CHANGE UP
POTASSIUM SERPL-MCNC: 3.9 MMOL/L — SIGNIFICANT CHANGE UP (ref 3.5–5.3)
POTASSIUM SERPL-MCNC: 4 MMOL/L — SIGNIFICANT CHANGE UP (ref 3.5–5.3)
POTASSIUM SERPL-SCNC: 3.9 MMOL/L — SIGNIFICANT CHANGE UP (ref 3.5–5.3)
POTASSIUM SERPL-SCNC: 4 MMOL/L — SIGNIFICANT CHANGE UP (ref 3.5–5.3)
PROT SERPL-MCNC: 3.8 G/DL — LOW (ref 6–8.3)
PROTHROM AB SERPL-ACNC: 14.7 SEC — HIGH (ref 10.5–13.4)
RBC # BLD: 1.85 M/UL — LOW (ref 3.8–5.2)
RBC # BLD: 2.37 M/UL — LOW (ref 3.8–5.2)
RBC # FLD: 22.4 % — HIGH (ref 10.3–14.5)
RBC # FLD: SIGNIFICANT CHANGE UP (ref 10.3–14.5)
RBC BLD AUTO: ABNORMAL
RH IG SCN BLD-IMP: POSITIVE — SIGNIFICANT CHANGE UP
RH IG SCN BLD-IMP: POSITIVE — SIGNIFICANT CHANGE UP
SARS-COV-2 RNA SPEC QL NAA+PROBE: NEGATIVE — SIGNIFICANT CHANGE UP
SMUDGE CELLS # BLD: PRESENT — SIGNIFICANT CHANGE UP
SODIUM SERPL-SCNC: 137 MMOL/L — SIGNIFICANT CHANGE UP (ref 135–145)
SODIUM SERPL-SCNC: 140 MMOL/L — SIGNIFICANT CHANGE UP (ref 135–145)
SPHEROCYTES BLD QL SMEAR: SLIGHT — SIGNIFICANT CHANGE UP
WBC # BLD: 5.02 K/UL — SIGNIFICANT CHANGE UP (ref 3.8–10.5)
WBC # BLD: 5.48 K/UL — SIGNIFICANT CHANGE UP (ref 3.8–10.5)
WBC # FLD AUTO: 5.02 K/UL — SIGNIFICANT CHANGE UP (ref 3.8–10.5)
WBC # FLD AUTO: 5.48 K/UL — SIGNIFICANT CHANGE UP (ref 3.8–10.5)

## 2022-06-19 PROCEDURE — 72197 MRI PELVIS W/O & W/DYE: CPT | Mod: 26

## 2022-06-19 RX ORDER — SODIUM CHLORIDE 9 MG/ML
1000 INJECTION INTRAMUSCULAR; INTRAVENOUS; SUBCUTANEOUS
Refills: 0 | Status: DISCONTINUED | OUTPATIENT
Start: 2022-06-19 | End: 2022-06-20

## 2022-06-19 RX ORDER — SODIUM CHLORIDE 9 MG/ML
1000 INJECTION INTRAMUSCULAR; INTRAVENOUS; SUBCUTANEOUS
Refills: 0 | Status: DISCONTINUED | OUTPATIENT
Start: 2022-06-19 | End: 2022-06-19

## 2022-06-19 RX ORDER — SODIUM CHLORIDE 9 MG/ML
1000 INJECTION INTRAMUSCULAR; INTRAVENOUS; SUBCUTANEOUS ONCE
Refills: 0 | Status: COMPLETED | OUTPATIENT
Start: 2022-06-19 | End: 2022-06-19

## 2022-06-19 RX ORDER — PROTHROMBIN COMPLEX CONCENTRATE (HUMAN) 25.5; 16.5; 24; 22; 22; 26 [IU]/ML; [IU]/ML; [IU]/ML; [IU]/ML; [IU]/ML; [IU]/ML
1500 POWDER, FOR SOLUTION INTRAVENOUS ONCE
Refills: 0 | Status: COMPLETED | OUTPATIENT
Start: 2022-06-19 | End: 2022-06-19

## 2022-06-19 RX ORDER — ATORVASTATIN CALCIUM 80 MG/1
80 TABLET, FILM COATED ORAL AT BEDTIME
Refills: 0 | Status: DISCONTINUED | OUTPATIENT
Start: 2022-06-19 | End: 2022-06-21

## 2022-06-19 RX ADMIN — SODIUM CHLORIDE 125 MILLILITER(S): 9 INJECTION INTRAMUSCULAR; INTRAVENOUS; SUBCUTANEOUS at 21:35

## 2022-06-19 RX ADMIN — ATORVASTATIN CALCIUM 80 MILLIGRAM(S): 80 TABLET, FILM COATED ORAL at 22:07

## 2022-06-19 RX ADMIN — SODIUM CHLORIDE 1000 MILLILITER(S): 9 INJECTION INTRAMUSCULAR; INTRAVENOUS; SUBCUTANEOUS at 16:10

## 2022-06-19 RX ADMIN — PROTHROMBIN COMPLEX CONCENTRATE (HUMAN) 400 INTERNATIONAL UNIT(S): 25.5; 16.5; 24; 22; 22; 26 POWDER, FOR SOLUTION INTRAVENOUS at 20:23

## 2022-06-19 NOTE — CONSULT NOTE ADULT - ASSESSMENT
42yo  w/ LMP  w/ known hx of fibroids, ischemic stroke now on anticoagulation presents with heavy vaginal bleeding.  - Patient continues to have vaginal bleeding, agree with blood transfusions for acute blood loss anemia. Patient currently not a candidate for systemic hormonal therapy or TXA given hx of ischemic stroke. IUD unlikely to be helpful given cervical fibroid distorting the intrauterine cavity, as well as hx of displacement of IUD. After discussion with attending, it is safer to proceed with IR consult for uterine artery embolization rather than taking patient to OR for surgical intervention.  - recommend complete pelvic ultrasound to evaluate for structural abnormalities as it was not performed at [facility].   - GYN will continue to follow.  - d/w Dr. Yepez 40yo  w/ LMP  w/ known hx of fibroids, ischemic stroke now on anticoagulation presents with heavy vaginal bleeding. Patient does not desire any future fertility.  - Patient continues to have vaginal bleeding, agree with blood transfusions for acute blood loss anemia. Patient currently not a candidate for systemic hormonal therapy or TXA given hx of ischemic stroke. IUD unlikely to be helpful given cervical fibroid distorting the intrauterine cavity, as well as hx of displacement of IUD. After discussion with attending, it is safer to proceed with IR consult for uterine artery embolization rather than taking patient to OR for surgical intervention.  - recommend complete pelvic ultrasound to evaluate for structural abnormalities as it was not performed at [facility].   - GYN will continue to follow.  - d/w Dr. Yepez 40yo  w/ LMP  w/ known hx of fibroids, ischemic stroke now on anticoagulation presents with heavy vaginal bleeding. Patient does not desire any future fertility.  - Patient continues to have vaginal bleeding, agree with blood transfusions for acute blood loss anemia. Patient currently not a candidate for systemic hormonal therapy or TXA given hx of ischemic stroke. IUD unlikely to be helpful given cervical fibroid distorting the intrauterine cavity, as well as hx of displacement of IUD. After discussion with attending, it is safer to proceed with IR consult for uterine artery embolization rather than taking patient to OR for surgical intervention.  - recommend complete pelvic ultrasound to evaluate for structural abnormalities as it was not performed at [facility].   - recommend pad counts, and maintain active type and screen  - GYN will continue to follow.  - d/w Dr. Yepez

## 2022-06-19 NOTE — PATIENT PROFILE ADULT - FALL HARM RISK - HARM RISK INTERVENTIONS

## 2022-06-19 NOTE — H&P ADULT - ASSESSMENT
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 On discharge from stroke patient was placed on DAPT then transitioned to Eliquis for stroke prevention for airline ride (did not go). Patient first day of last period was 6/18/2022 and at 3am 6/19/2022 patient started having menorrhagia. On arrival to OSH patient was hypotensive reportedly to 80s systolic and was given fluids patient said that transvaginal US was taken which showed a fibroid over the cervix. Transferred to [facility] for monitoring off eliquis. [facility] course was c/b dizziness with hypotension requiring two boluses, which patient responded to appropriately. Type and Screen sent, CBC showed heme 5.1, two units of pRBCs was ordered. Transfered to [facility] from [address] [place] [address].       Neuro  # Stroke education recent stroke with PFO possible APLS  -Consider Eliquis reversal   -Holding Eliquis due to acute bleeding     Heme  #Blood transfusion   -2 units pRBCs for heme of 5.1  -CBC Q 6hr  -Bed rest    Gyn  -Consult gyn to access current level of bleeding    Cards  #hypotension  - 125cc of fluids an hour  - telemetry   - monitor vitals and hemodynamics     Pulm  - call provider if SPO2 < 94%    GI  #Nutrition/Fluids/Electrolytes   - replete K<4 and Mg <2  - Diet: regular  - IVF: 125 cc an hour     Renal/ Urology   - Obtain urology history any hematuria?   -Follow up with GYN    Infectious Disease  - Stroke Code CXR results:     Endocrine  NTD    - TSH results:    DVT Prophylaxis  bleed risk SCDs for now      IDR Goals:      Discussed daily hospital plans and goals with patient and family at bedside. (Called and updated family.)    Discussed with Neurology Attending

## 2022-06-19 NOTE — CHART NOTE - NSCHARTNOTEFT_GEN_A_CORE
Patient admitted to [facility] under Dr. Oshea for further management. Patient stated to  that she feels as though she will faint and he alerted medical team. Rapid response called overhead. Patient pale and hypotensive with BPs 60s/40s. Pt was attached to monitor. Patient given 2 L NS bolus in pressure bag with improvement in BPs to 100s/60s which patient reports is her baseline. Patient mentating well, AAOx3 throughout entire event without loss of consciousness. STAT Labs and blood cultures drawn and sent. Hgb 5.1 and patient consented for blood. 1 unit pRBC ordered. Dr. Oshea notified and wanting to  monitor patient on [facility].

## 2022-06-19 NOTE — CONSULT NOTE ADULT - SUBJECTIVE AND OBJECTIVE BOX
42yo  w/ LMP  w/ known hx of fibroids, ischemic stroke now on anticoagulation presents with heavy vaginal bleeding. She had a hx of ischemic stroke on 5/3, and was started on ASA and plavix since then. She was started on eliquis 5mg bid today specifically for [place] the [place], and was planned to go back on ASA and plavix once her plane ride was over. After taking 1 dose or eliquis around 4:30am today, she started having heavy vaginal bleeding, soaking through 9 pads in an hour at the airport. She had 2 syncope episodes and was taken to [facility] where her she was hemodynamically stable with hgb of 10.2. Patient request transfer to [facility], so she arrived here this afternoon. Repeat cbc around 16:00 noted a hgb of 5.1, and pRBC transfusion was started (planned for 2 units). She had another syncopal episode [id number], with BP's around 60's/40's, with good response to IVF boluses (improved to 100's/60's). She currently continues to have heavy vaginal bleeding, soaking 1-2 pads every hour. Reports lightheadedness, palpitations, nausea. Denies SOB or CP. Prior to May 2022, she reports hx of HMB but never required IV iron or blood transfusions. She had an IUD last year, but was removed in 2021 as it was displaced. It did not help too much with her bleeding while it was in place. She saw her OBGYN Dr. Sheppard on , where posterior cervical fibroid 5cm in size was noted, and was planning on performing hysteroscopy myomectomy and endometrial ablation after her PFO is closed (planned for ).     Obhx:  term  c/b PEC w/SF,  term ,  term , 2013 VTOP D+C,  term  c/b vanishing twin,  medical VTOP,  SAB  Gynhx: hx fibroids and HMB as per HPI  PMH: hx ischemic stroke  as per HPI. postive for antiphospholipid antibody syndrome (followed by Dr. Paul of hematology), vestibular migraines  meds: ASA 81 qd, plavix 75 qd, famotidine bid, PNV (for iron), lipitor 40mg qd  all: nkda  social: denies     Vital Signs Last 24 Hrs  T(C): 37.1 (2022 14:54), Max: 37.1 (2022 14:54)  T(F): 98.8 (2022 14:54), Max: 98.8 (2022 14:54)  HR: 82 (2022 14:54) (82 - 82)  BP: 90/63 (2022 14:54) (90/63 - 90/63)  BP(mean): --  RR: 16 (2022 14:54) (16 - 16)  SpO2: 96% (2022 14:54) (96% - 96%)    Physical Exam:  Gen: NAD, comfortable  GI: soft, nontender, nondistended + BS, no rebound no guarding  BME: enlarged anteverted uterus, irregular contour, no adnexal masses appreciated , No cervical motion tenderness   Spec: 35cc bright red blood with clots, small amount of oozing from external cervical os, otherwise normal vaginal canal    LABS:                        5.1    5.02  )-----------( 144      ( 2022 16:03 )             16.4     06-19    140  |  114<H>  |  9   ----------------------------<  115<H>  3.9   |  17<L>  |  0.68    Ca    6.5<LL>      2022 16:03    TPro  3.8<L>  /  Alb  2.5<L>  /  TBili  0.3  /  DBili  x   /  AST  10  /  ALT  8<L>  /  AlkPhos  29<L>  06-19    PTT - ( 2022 16:03 )  PTT:33.7 sec      RADIOLOGY & ADDITIONAL STUDIES:   40yo  w/ LMP  w/ known hx of fibroids, ischemic stroke now on anticoagulation presents with heavy vaginal bleeding. She had a hx of ischemic stroke on 5/3, and was started on ASA and plavix since then. She was started on eliquis 5mg bid today specifically for [place], and was planned to go back on ASA and plavix once her plane ride was over. After taking 1 dose or eliquis around 4:30am today, she started having heavy vaginal bleeding, soaking through 9 pads in an hour at the airport. She had 2 syncope episodes and was taken to [facility] where her she was hemodynamically stable with hgb of 10.2. Patient request transfer to [facility], so she arrived here this afternoon. Repeat cbc around 16:00 noted a hgb of 5.1, and pRBC transfusion was started (planned for 2 units). She had another syncopal episode [id number], with BP's around 60's/40's, with good response to IVF boluses (improved to 100's/60's). She currently continues to have heavy vaginal bleeding, soaking 1-2 pads every hour. Reports lightheadedness, palpitations, nausea. Denies SOB or CP. Prior to May 2022, she reports hx of HMB but never required IV iron or blood transfusions. She had an IUD last year, but was removed in 2021 as it was displaced. It did not help too much with her bleeding while it was in place. She saw her OBGYN Dr. Sheppard on , where posterior cervical fibroid 5cm in size was noted, and was planning on performing hysteroscopy myomectomy and endometrial ablation after her PFO is closed (planned for ).     Obhx:  term  c/b PEC w/SF,  term ,  term , 2013 VTOP D+C,  term  c/b vanishing twin,  medical VTOP,  SAB  Gynhx: hx fibroids and HMB as per HPI  PMH: hx ischemic stroke  as per HPI. postive for antiphospholipid antibody syndrome (anti-cardiolipin antibody elevated on 22. Followed by Dr. Paul of hematology), vestibular migraines  meds: ASA 81 qd, plavix 75 qd, famotidine bid, PNV (for iron), lipitor 40mg qd  all: nkda  social: denies     Vital Signs Last 24 Hrs  T(C): 37.1 (2022 14:54), Max: 37.1 (2022 14:54)  T(F): 98.8 (2022 14:54), Max: 98.8 (2022 14:54)  HR: 82 (2022 14:54) (82 - 82)  BP: 90/63 (2022 14:54) (90/63 - 90/63)  BP(mean): --  RR: 16 (2022 14:54) (16 - 16)  SpO2: 96% (2022 14:54) (96% - 96%)    Physical Exam:  Gen: NAD, comfortable  GI: soft, nontender, nondistended + BS, no rebound no guarding  BME: enlarged anteverted uterus, irregular contour, no adnexal masses appreciated , No cervical motion tenderness   Spec: 35cc bright red blood with clots, small amount of oozing from external cervical os, otherwise normal vaginal canal    LABS:                        5.1    5.02  )-----------( 144      ( 2022 16:03 )             16.4     06-19    140  |  114<H>  |  9   ----------------------------<  115<H>  3.9   |  17<L>  |  0.68    Ca    6.5<LL>      2022 16:03    TPro  3.8<L>  /  Alb  2.5<L>  /  TBili  0.3  /  DBili  x   /  AST  10  /  ALT  8<L>  /  AlkPhos  29<L>  06-19    PTT - ( 2022 16:03 )  PTT:33.7 sec      RADIOLOGY & ADDITIONAL STUDIES:   42yo  w/ LMP  w/ known hx of fibroids, ischemic stroke now on anticoagulation presents with heavy vaginal bleeding. She had a hx of ischemic stroke on 5/3, and was started on ASA and plavix since then. She was started on eliquis 5mg bid today specifically for [place], and was planned to go back on ASA and plavix once her plane ride was over. After taking 1 dose or eliquis around 4:30am today, she started having heavy vaginal bleeding, soaking through 9 pads in an hour at the airport. She had 2 syncope episodes and was taken to [facility] where her she was hemodynamically stable with hgb of 10.2. Patient request transfer to [facility], so she arrived here this afternoon. Repeat cbc around 16:00 noted a hgb of 5.1, and pRBC transfusion was started (planned for 2 units). She had another syncopal episode [id number], with BP's around 60's/40's, with good response to IVF boluses (improved to 100's/60's). She currently continues to have heavy vaginal bleeding, soaking 1-2 pads every hour. Reports lightheadedness, palpitations, nausea. Denies SOB or CP. Prior to May 2022, she reports hx of HMB but never required IV iron or blood transfusions. She had an IUD last year, but was removed in 2021 as it was displaced. It did not help too much with her bleeding while it was in place. She saw her OBGYN Dr. Sheppard on , where posterior cervical fibroid 5cm in size was noted, and was planning on performing hysteroscopy myomectomy and endometrial ablation after her PFO is closed (planned for ). Patient does not desire any future fertility.    Obhx:  term  c/b PEC w/SF,  term ,  term , 2013 VTOP D+C,  term  c/b vanishing twin,  medical VTOP,  SAB  Gynhx: hx fibroids and HMB as per HPI  PMH: hx ischemic stroke  as per HPI. postive for antiphospholipid antibody syndrome (anti-cardiolipin antibody elevated on 22. Followed by Dr. Paul of hematology), vestibular migraines  meds: ASA 81 qd, plavix 75 qd, famotidine bid, PNV (for iron), lipitor 40mg qd  all: nkda  social: denies     Vital Signs Last 24 Hrs  T(C): 37.1 (2022 14:54), Max: 37.1 (2022 14:54)  T(F): 98.8 (2022 14:54), Max: 98.8 (2022 14:54)  HR: 82 (2022 14:54) (82 - 82)  BP: 90/63 (2022 14:54) (90/63 - 90/63)  BP(mean): --  RR: 16 (2022 14:54) (16 - 16)  SpO2: 96% (2022 14:54) (96% - 96%)    Physical Exam:  Gen: NAD, comfortable  GI: soft, nontender, nondistended + BS, no rebound no guarding  BME: enlarged anteverted uterus, irregular contour, no adnexal masses appreciated , No cervical motion tenderness   Spec: 35cc bright red blood with clots, small amount of oozing from external cervical os, otherwise normal vaginal canal    LABS:                        5.1    5.02  )-----------( 144      ( 2022 16:03 )             16.4     06-19    140  |  114<H>  |  9   ----------------------------<  115<H>  3.9   |  17<L>  |  0.68    Ca    6.5<LL>      2022 16:03    TPro  3.8<L>  /  Alb  2.5<L>  /  TBili  0.3  /  DBili  x   /  AST  10  /  ALT  8<L>  /  AlkPhos  29<L>  06-19    PTT - ( 2022 16:03 )  PTT:33.7 sec      RADIOLOGY & ADDITIONAL STUDIES:

## 2022-06-19 NOTE — H&P ADULT - HISTORY OF PRESENT ILLNESS
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 Patient first day of last period was 6/18/2022 and at 3am 6/19/2022 patient started having menorrhagia. On arrival to OSH patient was hypotensive reportedly to 80s systolic and was given fluids. Transferred to [facility] for monitoring off eliquis. [facility] course was c/b dizziness with hypotension requiring two boluses, which patient responded to appropriately. Type and Screen, CBC, PTT/PT/INR and BMP is being sent.      PAST MEDICAL & SURGICAL HISTORY:      FAMILY HISTORY:      SOCIAL HISTORY:   Patient lives with *** at ***.   Smoking status:  Drinking:  Drug Use:     ROS: ***  Constitutional: No fever, weight loss or fatigue  Eyes: No eye pain, visual disturbances, or discharge  ENMT:  No difficulty hearing, tinnitus, vertigo; No sinus or throat pain  Neck: No pain or stiffness  Respiratory: No cough, wheezing, chills or hemoptysis  Cardiovascular: No chest pain, palpitations, shortness of breath, dizziness or leg swelling  Gastrointestinal: No abdominal pain. No nausea, vomiting or hematemesis; No diarrhea or constipation. Nohematochezia.  Genitourinary: No dysuria, frequency, hematuria or incontinence  Neurological: As per HPI  Skin: No itching, burning, rashes or lesions   Endocrine: No heat or cold intolerance; No hair loss  Musculoskeletal: No joint pain or swelling; No muscle, back or extremity pain  Psychiatric: No depression, anxiety, mood swings or difficulty sleeping  Heme/Lymph: No easy bruising or bleeding gums    T(C): 37.1 (06-19-22 @ 14:54), Max: 37.1 (06-19-22 @ 14:54)  HR: 82 (06-19-22 @ 14:54) (82 - 82)  BP: 90/63 (06-19-22 @ 14:54) (90/63 - 90/63)  RR: 16 (06-19-22 @ 14:54) (16 - 16)  SpO2: 96% (06-19-22 @ 14:54) (96% - 96%)    MEDICATION RECONCILIATION   MEDICATIONS  (STANDING):  atorvastatin 80 milliGRAM(s) Oral at bedtime  sodium chloride 0.9% Bolus 1000 milliLiter(s) IV Bolus once  sodium chloride 0.9%. 1000 milliLiter(s) (75 mL/Hr) IV Continuous <Continuous>    MEDICATIONS  (PRN):    Allergies    No Known Allergies    Intolerances      Vital Signs Last 24 Hrs  T(C): 37.1 (19 Jun 2022 14:54), Max: 37.1 (19 Jun 2022 14:54)  T(F): 98.8 (19 Jun 2022 14:54), Max: 98.8 (19 Jun 2022 14:54)  HR: 82 (19 Jun 2022 14:54) (82 - 82)  BP: 90/63 (19 Jun 2022 14:54) (90/63 - 90/63)  BP(mean): --  RR: 16 (19 Jun 2022 14:54) (16 - 16)  SpO2: 96% (19 Jun 2022 14:54) (96% - 96%)    Physical exam:  General: No acute distress, awake and alert  Cardiovascular: Regular rate and rhythm, no murmurs, rubs, or gallops. No bruits  Pulmonary: Anterior breath sounds clear bilaterally, no crackles or wheezing. No use of accessory muscles  GI: Abdomen soft, non-tender, non-distended    Neurologic:  -Mental status: Awake, alert, oriented to person, place, and time. Speech is fluent with intact naming, repetition, and comprehension, no dysarthria. Recent and remote memory intact. Follows commands. Attention/concentration intact. Fund of knowledge appropriate.  -Cranial nerves:   II: Visual fields are full to confrontation.  III, IV, VI: Extraocular movements are intact without nystagmus. Pupils equally round and reactive to light  V:  Facial sensation V1-V3 equal and intact   VII: Face is symmetric with normal eye closure and smile  VIII: Hearing is bilaterally intact to finger rub  IX, X: Uvula is midline and soft palate rises symmetrically  XI: Head turning and shoulder shrug are intact.  XII: Tongue protrudes midline  Motor: Normal bulk and tone. No pronator drift. Strength bilateral upper extremity 5/5, bilateral lower extremities 5/5.  Rapid alternating movements intact and symmetric  Sensation: Intact to light touch bilaterally. No neglect or extinction on double simultaneous testing.  Coordination: No dysmetria on finger-to-nose and heel-to-shin bilaterally  Reflexes: Downgoing toes bilaterally   Gait: Narrow gait and steady    NIHSS: **** ASPECT Score: *** ICH Score: *** (GCS)    Fingerstick Blood Glucose: CAPILLARY BLOOD GLUCOSE      POCT Blood Glucose.: 136 mg/dL (19 Jun 2022 16:01)    LABS:                    RADIOLOGY & ADDITIONAL STUDIES:    HCT:     CTA:    -----------------------------------------------------------------------------------------    ASSESSMENT/PLAN:    41y Female w/ PMH ***. HCT showed ***. CTA showed ***. Given *** tPA was ***. 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 On discharge from stroke patient was placed on DAPT then transitioned to Eliquis for stroke prevention for airline ride (did not go). Patient first day of last period was 6/18/2022 and at 3am 6/19/2022 patient started having menorrhagia. On arrival to OSH patient was hypotensive reportedly to 80s systolic and was given fluids patient said that transvaginal US was taken which showed a fibroid over the cervix. Transferred to [facility] for monitoring off eliquis. [facility] course was c/b dizziness with hypotension requiring two boluses, which patient responded to appropriately. Type and Screen sent, CBC showed heme 5.1, two units of pRBCs was ordered. Transfered to [facility] from [address] [place] [address].     PAST MEDICAL & SURGICAL HISTORY:  Patient has one living son no history of miscarriages   during last admission for stroke one elevated lab for APLS was found.   Right partial stroke     FAMILY HISTORY:  Mother has history of miscarriages    SOCIAL HISTORY:   Patient lives with  and son.   Smoking status: no  Drinking:  Drug Use:     ROS:   Constitutional: No fever, weight loss or fatigue admits dizziness, fatigue feeling like shes going to pass out   Eyes: No eye pain, visual disturbances, or discharge  ENMT:  No difficulty hearing, tinnitus, vertigo; No sinus or throat pain  Neck: No pain or stiffness  Respiratory: No cough, wheezing, chills or hemoptysis  Cardiovascular: No chest pain, palpitations, shortness of breath, dizziness   Gastrointestinal: lower abdominal pain. No nausea, vomiting or hematemesis; No diarrhea or constipation. Nohematochezia.  Genitourinary: positive for vaginal bleeding  Neurological: As per HPI  Skin: pale   Heme/Lymph: bruising on lower legs    T(C): 37.1 (06-19-22 @ 14:54), Max: 37.1 (06-19-22 @ 14:54)  HR: 82 (06-19-22 @ 14:54) (82 - 82)  BP: 90/63 (06-19-22 @ 14:54) (90/63 - 90/63)  RR: 16 (06-19-22 @ 14:54) (16 - 16)  SpO2: 96% (06-19-22 @ 14:54) (96% - 96%)    MEDICATION RECONCILIATION   MEDICATIONS  (STANDING):  atorvastatin 80 milliGRAM(s) Oral at bedtime  sodium chloride 0.9% Bolus 1000 milliLiter(s) IV Bolus once  sodium chloride 0.9%. 1000 milliLiter(s) (75 mL/Hr) IV Continuous <Continuous>    MEDICATIONS  (PRN):    Allergies    No Known Allergies    Intolerances      Vital Signs Last 24 Hrs  T(C): 37.1 (19 Jun 2022 14:54), Max: 37.1 (19 Jun 2022 14:54)  T(F): 98.8 (19 Jun 2022 14:54), Max: 98.8 (19 Jun 2022 14:54)  HR: 82 (19 Jun 2022 14:54) (82 - 82)  BP: 90/63 (19 Jun 2022 14:54) (90/63 - 90/63)  BP(mean): --  RR: 16 (19 Jun 2022 14:54) (16 - 16)  SpO2: 96% (19 Jun 2022 14:54) (96% - 96%)    NIHSS: 0 ASPECT Score: *** ICH Score: *** (GCS)    Fingerstick Blood Glucose: CAPILLARY BLOOD GLUCOSE      POCT Blood Glucose.: 136 mg/dL (19 Jun 2022 16:01)

## 2022-06-19 NOTE — PATIENT PROFILE ADULT - NSPROIMPLANTSMEDDEV_GEN_A_NUR
Monitor BP at home and call if consistently out of goal ranges   Call me if you have more afib  Follow up in May None

## 2022-06-19 NOTE — H&P ADULT - NSHPPHYSICALEXAM_GEN_ALL_CORE
Date of Service: 04/01/2019    REQUESTING PHYSICIAN:  Dr. Boogie.    HISTORY OF PRESENT ILLNESS:  This is a 52-year-old female who actually was seen by me in the clinic last week and prior to that had not followed up in over a year.    She has been oxygen dependent since 2014 after her coronary artery bypass grafting.  She is supposed to be on 4 liters of oxygen per minute, although her pulse ox was perfectly acceptable on room air last week in the office and she told me she had only been using the oxygen at night as of late.  She has a clinical history of asthma since childhood with symptoms of chronic dyspnea, cough and wheezing.  She was only taking an Albuterol inhaler the last time I saw her.  We have attempted workup on her; however, she has only followed through with some of testing.  Previous CT scan has shown mosaic attenuation and pulmonary function studies were consistent with mild restriction.  We had wanted to update her CT scan and obtain a sleep study on her as she did have many signs and symptoms of obstructive sleep apnea.    She came to the ED yesterday with complaints of worsening cough, subjective fever and diarrhea.  She tells me that she has had these symptoms for a few months, but they got acutely worse over the weekend.  She recently had a friend who was diagnosed with H1N1.  In the ED, her temperature was as high as 100.7.  She had evidence of leukocytosis with a white blood cell count of 15.4.  D-dimer was normal as was NT-proBNP.  Lactate was initially elevated at 2.5, but improved to 1.6.  CT scan was updated.  However, it was not done with high resolution cuts, but upon personal review and comparison to previous films, it seems to show persistent diffuse mosaic attenuation as well as strandy opacities in the bases, and an unchanged 8 mm right lower lobe pulmonary nodule, now stable for 5 years.  She tells me that her cough is dry.  She currently appears comfortable on room air, but  did wear an OxyMask overnight.    PAST MEDICAL HISTORY:  1.  Mild restrictive lung disease (total lung capacity 76%).  2.  Anxiety.  3.  Osteoarthritis.  4.  Deep venous thrombosis.  5.  Bronchitis.  6.  Coronary artery disease.  7.  Pain.  8.  Depression.  9.  Diabetes mellitus.  10.  Hypertension.  11.  Hyperlipidemia.  12.  Cervical cancer.  13.  Status post coronary artery bypass graft x2.  14.  Possible asthma per clinical history.      CURRENT MEDICATIONS:  Reviewed per Epic include:  Amitriptyline 100 mg p.o. daily.  Aspirin 81 mg p.o. daily.  Atorvastatin 80 mg p.o. daily.  Cefepime 2000 mg IV t.i.d.  Plavix 75 mg p.o. daily.  Lovenox 40 mg subq daily.  Gabapentin 300 mg p.o. t.i.d.  Mucinex  mg p.o. b.i.d.  Insulin Lispro sliding scale t.i.d. subq.    Lactated Ringer's 1000 mg IV once.  Flagyl 500 mg IV t.i.d.  Pantoprazole 40 mg p.o. daily.  Potassium Chloride ER 10 mEq p.o. daily.  Vancomycin 1250 mg IV b.i.d.    ALLERGIES:  NKDA.    SOCIAL HISTORY:  The patient lives with a friend and her 4 children.  She has a history of smoking about 5-8 cigarettes a day for the last 35 years.  She does not use alcohol.  Previous history of illicit substance abuse.  She has worked as a , but has not worked since 2014.  No pets in the home.  No recent travel.  She is up-to-date on her vaccines including influenza.  Last pneumonia vaccine was 03/10/2014.    FAMILY HISTORY:  Noncontributory.    REVIEW OF SYSTEMS:  A 10-point review of system was performed, otherwise negative.    PHYSICAL EXAMINATION:  VITAL SIGNS:  Temperature 97.4, pulse of 82, respiratory rate 19, blood pressure 111/64, pulse ox 92% on OxyMask 4 liters per minute.  GENERAL:  She is a well-developed, well-nourished female who is in no apparent distress.  LUNGS:  With subtle bibasilar crackles.  HEART:  Irregular.  ABDOMEN:  Soft, nontender to palpation.  EXTREMITIES:  Warm and dry.  No significant pretibial edema.  NEUROLOGIC:  She is grossly  normal.    REVIEW OF DIAGNOSTIC DATA:  Pertinent findings as above.  Last echocardiogram 01/16/2018.  Ejection fraction of 57%, grade 1 diastolic dysfunction, normal right ventricular size and function with normal right ventricular systolic pressure.    IMPRESSION:  1.  Cough.  2.  Dyspnea.  3.  History of hypoxemia.  4.  Probable obstructive sleep apnea.    PLAN:  At this time, the patient's CT scan demonstrates evidence of mosaic attenuation which is nonspecific and very similar and stable to previous exams.  I am not sure of the etiology.  Certainly this could be related to air trapping or true ground glass opacity secondary to some type of an interstitial lung disease process.  I am not sure if this explains her worsening cough, but there is no evidence of an acute pneumonic process.  Will await respiratory pathogen panel and agree with empiric antibiotics given the elevated lactate and white blood cell count as well as low-grade fever.  Could consider sending off interstitial lung disease blood panel as I do not believe we have previously done this.  She needs to complete the sleep study that we discussed during the office visit and she prefers to do this at [facility].  We did send the order last week, so hopefully of authorization will be obtained soon.  Currently, she seems stable on room air and is otherwise close to baseline from an oxygen perspective.    Thank you for involving me in the care of this patient.  I will continue to follow her closely through her hospitalization.  Please free to contact me with any questions or concerns.      Dictated By: Jenny Aguirre NP  Signing Provider: MD CLAIRE Ross/dave ([id number])  DD: 04/01/2019 08:00:48 TD: 04/01/2019 08:33:45    Copy Sent To:    Physical exam:  General: No acute distress, awake and alert  Cardiovascular: Regular rate and rhythm,   Pulmonary: No use of accessory muscles  GI: Abdomen soft, non-tender, non-distended    Neurologic:  -Mental status: Awake, alert, oriented to person, place, and time. Speech is fluent with intact naming, repetition, and comprehension, no dysarthria. Recent and remote memory intact. Follows commands. Attention/concentration intact. Fund of knowledge appropriate.  -Cranial nerves:   II: Visual fields are full to confrontation.  III, IV, VI: Extraocular movements are intact without nystagmus. Pupils equally round and reactive to light  V:  Facial sensation V1-V3 equal and intact   VII: Face is symmetric with normal eye closure and smile  VIII: Hearing is bilaterally intact to finger rub  IX, X: Uvula is midline and soft palate rises symmetrically  XI: Head turning and shoulder shrug are intact.  XII: Tongue protrudes midline  Motor: Normal bulk and tone. No pronator drift. Strength bilateral upper extremity 5/5, bilateral lower extremities 5/5.  Rapid alternating movements intact and symmetric  Sensation: Intact to light touch bilaterally. No neglect or extinction on double simultaneous testing.  Coordination: No dysmetria on finger-to-nose and heel-to-shin bilaterally  Reflexes: Downgoing toes bilaterally   Gait: Narrow gait and steady

## 2022-06-20 ENCOUNTER — TRANSCRIPTION ENCOUNTER (OUTPATIENT)
Age: 42
End: 2022-06-20

## 2022-06-20 LAB
ANION GAP SERPL CALC-SCNC: 8 MMOL/L — SIGNIFICANT CHANGE UP (ref 5–17)
APTT BLD: 29.2 SEC — SIGNIFICANT CHANGE UP (ref 27.5–35.5)
BASOPHILS # BLD AUTO: 0.01 K/UL — SIGNIFICANT CHANGE UP (ref 0–0.2)
BASOPHILS NFR BLD AUTO: 0.2 % — SIGNIFICANT CHANGE UP (ref 0–2)
BUN SERPL-MCNC: 7 MG/DL — SIGNIFICANT CHANGE UP (ref 7–23)
CALCIUM SERPL-MCNC: 7.7 MG/DL — LOW (ref 8.4–10.5)
CHLORIDE SERPL-SCNC: 108 MMOL/L — SIGNIFICANT CHANGE UP (ref 96–108)
CHOLEST SERPL-MCNC: 64 MG/DL — SIGNIFICANT CHANGE UP
CO2 SERPL-SCNC: 22 MMOL/L — SIGNIFICANT CHANGE UP (ref 22–31)
CREAT SERPL-MCNC: 0.66 MG/DL — SIGNIFICANT CHANGE UP (ref 0.5–1.3)
EGFR: 112 ML/MIN/1.73M2 — SIGNIFICANT CHANGE UP
EOSINOPHIL # BLD AUTO: 0.04 K/UL — SIGNIFICANT CHANGE UP (ref 0–0.5)
EOSINOPHIL NFR BLD AUTO: 0.7 % — SIGNIFICANT CHANGE UP (ref 0–6)
FIBRINOGEN PPP-MCNC: 205 MG/DL — LOW (ref 258–438)
GLUCOSE SERPL-MCNC: 109 MG/DL — HIGH (ref 70–99)
HCT VFR BLD CALC: 25.2 % — LOW (ref 34.5–45)
HDLC SERPL-MCNC: 34 MG/DL — LOW
HGB BLD-MCNC: 8.5 G/DL — LOW (ref 11.5–15.5)
IMM GRANULOCYTES NFR BLD AUTO: 0.5 % — SIGNIFICANT CHANGE UP (ref 0–1.5)
INR BLD: 1.22 — HIGH (ref 0.88–1.16)
LIPID PNL WITH DIRECT LDL SERPL: 21 MG/DL — SIGNIFICANT CHANGE UP
LYMPHOCYTES # BLD AUTO: 0.69 K/UL — LOW (ref 1–3.3)
LYMPHOCYTES # BLD AUTO: 11.9 % — LOW (ref 13–44)
MAGNESIUM SERPL-MCNC: 1.7 MG/DL — SIGNIFICANT CHANGE UP (ref 1.6–2.6)
MCHC RBC-ENTMCNC: 29.3 PG — SIGNIFICANT CHANGE UP (ref 27–34)
MCHC RBC-ENTMCNC: 33.7 GM/DL — SIGNIFICANT CHANGE UP (ref 32–36)
MCV RBC AUTO: 86.9 FL — SIGNIFICANT CHANGE UP (ref 80–100)
MONOCYTES # BLD AUTO: 0.08 K/UL — SIGNIFICANT CHANGE UP (ref 0–0.9)
MONOCYTES NFR BLD AUTO: 1.4 % — LOW (ref 2–14)
NEUTROPHILS # BLD AUTO: 4.95 K/UL — SIGNIFICANT CHANGE UP (ref 1.8–7.4)
NEUTROPHILS NFR BLD AUTO: 85.3 % — HIGH (ref 43–77)
NON HDL CHOLESTEROL: 30 MG/DL — SIGNIFICANT CHANGE UP
NRBC # BLD: 0 /100 WBCS — SIGNIFICANT CHANGE UP (ref 0–0)
PHOSPHATE SERPL-MCNC: 2.4 MG/DL — LOW (ref 2.5–4.5)
PLATELET # BLD AUTO: 158 K/UL — SIGNIFICANT CHANGE UP (ref 150–400)
POTASSIUM SERPL-MCNC: 3.9 MMOL/L — SIGNIFICANT CHANGE UP (ref 3.5–5.3)
POTASSIUM SERPL-SCNC: 3.9 MMOL/L — SIGNIFICANT CHANGE UP (ref 3.5–5.3)
PROTHROM AB SERPL-ACNC: 14.5 SEC — HIGH (ref 10.5–13.4)
RBC # BLD: 2.9 M/UL — LOW (ref 3.8–5.2)
RBC # FLD: 18.9 % — HIGH (ref 10.3–14.5)
SODIUM SERPL-SCNC: 138 MMOL/L — SIGNIFICANT CHANGE UP (ref 135–145)
TRIGL SERPL-MCNC: 44 MG/DL — SIGNIFICANT CHANGE UP
WBC # BLD: 5.8 K/UL — SIGNIFICANT CHANGE UP (ref 3.8–10.5)
WBC # FLD AUTO: 5.8 K/UL — SIGNIFICANT CHANGE UP (ref 3.8–10.5)

## 2022-06-20 PROCEDURE — 88305 TISSUE EXAM BY PATHOLOGIST: CPT | Mod: 26

## 2022-06-20 PROCEDURE — 93970 EXTREMITY STUDY: CPT | Mod: 26

## 2022-06-20 DEVICE — SURGICEL 4 X 8": Type: IMPLANTABLE DEVICE | Status: FUNCTIONAL

## 2022-06-20 RX ORDER — SODIUM CHLORIDE 9 MG/ML
1000 INJECTION, SOLUTION INTRAVENOUS
Refills: 0 | Status: DISCONTINUED | OUTPATIENT
Start: 2022-06-20 | End: 2022-06-20

## 2022-06-20 RX ORDER — METOCLOPRAMIDE HCL 10 MG
10 TABLET ORAL EVERY 6 HOURS
Refills: 0 | Status: DISCONTINUED | OUTPATIENT
Start: 2022-06-20 | End: 2022-06-21

## 2022-06-20 RX ORDER — ASPIRIN/CALCIUM CARB/MAGNESIUM 324 MG
1 TABLET ORAL
Qty: 0 | Refills: 0 | DISCHARGE
Start: 2022-06-20

## 2022-06-20 RX ORDER — ATORVASTATIN CALCIUM 80 MG/1
1 TABLET, FILM COATED ORAL
Qty: 0 | Refills: 0 | DISCHARGE
Start: 2022-06-20

## 2022-06-20 RX ORDER — ONDANSETRON 8 MG/1
8 TABLET, FILM COATED ORAL EVERY 6 HOURS
Refills: 0 | Status: DISCONTINUED | OUTPATIENT
Start: 2022-06-20 | End: 2022-06-21

## 2022-06-20 RX ORDER — ACETAMINOPHEN 500 MG
2 TABLET ORAL
Qty: 0 | Refills: 0 | DISCHARGE
Start: 2022-06-20

## 2022-06-20 RX ORDER — OXYCODONE HYDROCHLORIDE 5 MG/1
5 TABLET ORAL EVERY 4 HOURS
Refills: 0 | Status: DISCONTINUED | OUTPATIENT
Start: 2022-06-20 | End: 2022-06-21

## 2022-06-20 RX ORDER — FAMOTIDINE 10 MG/ML
20 INJECTION INTRAVENOUS EVERY 12 HOURS
Refills: 0 | Status: DISCONTINUED | OUTPATIENT
Start: 2022-06-20 | End: 2022-06-21

## 2022-06-20 RX ORDER — ACETAMINOPHEN 500 MG
1000 TABLET ORAL ONCE
Refills: 0 | Status: COMPLETED | OUTPATIENT
Start: 2022-06-20 | End: 2022-06-20

## 2022-06-20 RX ORDER — HYDROMORPHONE HYDROCHLORIDE 2 MG/ML
0.5 INJECTION INTRAMUSCULAR; INTRAVENOUS; SUBCUTANEOUS
Refills: 0 | Status: DISCONTINUED | OUTPATIENT
Start: 2022-06-20 | End: 2022-06-20

## 2022-06-20 RX ORDER — IRON SUCROSE 20 MG/ML
200 INJECTION, SOLUTION INTRAVENOUS ONCE
Refills: 0 | Status: COMPLETED | OUTPATIENT
Start: 2022-06-20 | End: 2022-06-20

## 2022-06-20 RX ORDER — ASPIRIN/CALCIUM CARB/MAGNESIUM 324 MG
81 TABLET ORAL DAILY
Refills: 0 | Status: DISCONTINUED | OUTPATIENT
Start: 2022-06-20 | End: 2022-06-21

## 2022-06-20 RX ORDER — ACETAMINOPHEN 500 MG
1000 TABLET ORAL EVERY 6 HOURS
Refills: 0 | Status: COMPLETED | OUTPATIENT
Start: 2022-06-20 | End: 2022-06-20

## 2022-06-20 RX ORDER — CLOPIDOGREL BISULFATE 75 MG/1
1 TABLET, FILM COATED ORAL
Qty: 0 | Refills: 0 | DISCHARGE
Start: 2022-06-20

## 2022-06-20 RX ORDER — SIMETHICONE 80 MG/1
80 TABLET, CHEWABLE ORAL EVERY 6 HOURS
Refills: 0 | Status: DISCONTINUED | OUTPATIENT
Start: 2022-06-20 | End: 2022-06-21

## 2022-06-20 RX ORDER — HYDROMORPHONE HYDROCHLORIDE 2 MG/ML
0.2 INJECTION INTRAMUSCULAR; INTRAVENOUS; SUBCUTANEOUS
Refills: 0 | Status: DISCONTINUED | OUTPATIENT
Start: 2022-06-20 | End: 2022-06-21

## 2022-06-20 RX ORDER — CLOPIDOGREL BISULFATE 75 MG/1
75 TABLET, FILM COATED ORAL DAILY
Refills: 0 | Status: DISCONTINUED | OUTPATIENT
Start: 2022-06-20 | End: 2022-06-21

## 2022-06-20 RX ADMIN — Medication 400 MILLIGRAM(S): at 03:17

## 2022-06-20 RX ADMIN — HYDROMORPHONE HYDROCHLORIDE 0.2 MILLIGRAM(S): 2 INJECTION INTRAMUSCULAR; INTRAVENOUS; SUBCUTANEOUS at 03:47

## 2022-06-20 RX ADMIN — Medication 1000 MILLIGRAM(S): at 10:26

## 2022-06-20 RX ADMIN — Medication 81 MILLIGRAM(S): at 12:38

## 2022-06-20 RX ADMIN — HYDROMORPHONE HYDROCHLORIDE 0.2 MILLIGRAM(S): 2 INJECTION INTRAMUSCULAR; INTRAVENOUS; SUBCUTANEOUS at 03:28

## 2022-06-20 RX ADMIN — FAMOTIDINE 20 MILLIGRAM(S): 10 INJECTION INTRAVENOUS at 14:30

## 2022-06-20 RX ADMIN — Medication 1000 MILLIGRAM(S): at 09:38

## 2022-06-20 RX ADMIN — CLOPIDOGREL BISULFATE 75 MILLIGRAM(S): 75 TABLET, FILM COATED ORAL at 12:38

## 2022-06-20 RX ADMIN — SODIUM CHLORIDE 110 MILLILITER(S): 9 INJECTION, SOLUTION INTRAVENOUS at 04:40

## 2022-06-20 RX ADMIN — ATORVASTATIN CALCIUM 80 MILLIGRAM(S): 80 TABLET, FILM COATED ORAL at 21:34

## 2022-06-20 RX ADMIN — Medication 1000 MILLIGRAM(S): at 03:32

## 2022-06-20 RX ADMIN — IRON SUCROSE 110 MILLIGRAM(S): 20 INJECTION, SOLUTION INTRAVENOUS at 14:39

## 2022-06-20 NOTE — PROGRESS NOTE ADULT - ASSESSMENT
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     - Once stable, will need to discuss timing of restarting antiplatelet therapy since she had a RMCA stroke previously  - Stroke team will continue to follow    Case Discussed with Neurology Attending Dr. Oshea 41y Female with PMHx of RMCA CVA (left facial and dysarthria now resolved on May 2022), Known PFO (closure scheduled for 7/1/2022) on eliquis for stroke prevention for airline flight, presented to [place] ED for syncope and dizziness, found to be hypotensive and anemic in the setting of severe vaginal bleeding now s/p Kcentra and 2U pRBC's.     - Once stable, will need to discuss timing of restarting antiplatelet therapy d/t history of R MCA stroke. OK to hold for now.  - Stroke team will continue to follow    Case Discussed with Neurology Attending Dr. Oshea

## 2022-06-20 NOTE — DISCHARGE NOTE PROVIDER - CARE PROVIDER_API CALL
Yael Yepez)  Obstetrics and Gynecology  [address] [place] [address]  Phone: [phone]  Fax: [phone]  Follow Up Time:

## 2022-06-20 NOTE — PROGRESS NOTE ADULT - SUBJECTIVE AND OBJECTIVE BOX
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 On discharge from stroke patient was placed on DAPT then transitioned to Eliquis for stroke prevention for airline ride (did not go). Patient first day of last period was 6/18/2022 and at 3am 6/19/2022 patient started having menorrhagia. On arrival to OSH patient was hypotensive reportedly to 80s systolic and was given fluids patient said that transvaginal US was taken which showed a fibroid over the cervix. Transferred to [facility] for monitoring off eliquis. [facility] course was c/b dizziness with hypotension requiring two boluses, which patient responded to appropriately. Type and Screen sent, CBC showed heme 5.1, two units of pRBCs was ordered. Transfered to [facility] from [address] [place] [address].  ICU was consulted at change of shift due to concern for needing upgrade in care. Patient received 1st PRBC as well as Kcentra for Eliquis reversal, repeat CBC with Hgb 6.6, 2nd unit of PRBC administered. Patient brought to MRI for MR pelvis with contrast as IR requested imaging for possible uterine artery embolization. Upon return to [facility], patient still passing large blood clots and gynecology was informed. Decision was made to transfer patient to gynecology service from stroke team for intervention and management.     Neurology Stroke Progress Note    MEDICATIONS  (STANDING):  atorvastatin 80 milliGRAM(s) Oral at bedtime  sodium chloride 0.9%. 1000 milliLiter(s) (125 mL/Hr) IV Continuous <Continuous>    MEDICATIONS  (PRN):      Allergies    No Known Allergies    Intolerances        Vital Signs Last 24 Hrs  T(C): 36.9 (20 Jun 2022 00:29), Max: 37.2 (19 Jun 2022 22:19)  T(F): 98.5 (20 Jun 2022 00:29), Max: 98.9 (19 Jun 2022 22:19)  HR: 83 (20 Jun 2022 00:29) (82 - 102)  BP: 92/57 (20 Jun 2022 00:29) (90/63 - 102/57)  BP(mean): 68 (19 Jun 2022 23:01) (68 - 73)  RR: 14 (20 Jun 2022 00:29) (14 - 19)  SpO2: 99% (20 Jun 2022 00:29) (96% - 100%)    Physical exam:  General: awake and alert  Eyes: Anicteric sclerae, moist conjunctivae, see below for CNs  Neck: trachea midline  Cardiovascular: Regular rate and rhythm  Pulmonary:  No use of accessory muscles  GI: Abdomen soft, pad saturated with sheron red blood.  Extremities:, no edema    Neurologic:  -Mental status: Awake, alert, oriented to person, place, and time. Speech is fluent with intact naming, repetition, and comprehension, no dysarthria. Recent and remote memory intact. Follows commands. Attention/concentration intact. Fund of knowledge appropriate.  -Cranial nerves:   II: Visual fields are full to confrontation.  III, IV, VI: Extraocular movements are intact without nystagmus. Pupils equally round and reactive to light  VII: Face is symmetric   Motor: Normal bulk and tone. No pronator drift. Strength bilateral upper extremity 5/5, bilateral lower extremities 5/5.  Sensation: Intact to light touch bilaterally. No neglect or extinction on double simultaneous testing.  Coordination: No dysmetria on finger-to-nose      LABS:                        6.6    5.48  )-----------( 179      ( 19 Jun 2022 22:00 )             21.0     06-19    137  |  108  |  8   ----------------------------<  111<H>  4.0   |  20<L>  |  0.72    Ca    7.3<L>      19 Jun 2022 22:01    TPro  3.8<L>  /  Alb  2.5<L>  /  TBili  0.3  /  DBili  x   /  AST  10  /  ALT  8<L>  /  AlkPhos  29<L>  06-19    PT/INR - ( 19 Jun 2022 22:01 )   PT: 14.7 sec;   INR: 1.23          PTT - ( 19 Jun 2022 22:01 )  PTT:31.5 sec      RADIOLOGY & ADDITIONAL TESTS:  < from: MR Pelvis w/wo IV Cont (06.19.22 @ 21:26) >  IMPRESSION:  1. Mass arising from the posterior lower uterine segment extending into   the  endometrial canal and upper cervix measuringup to 3.0 x 2.6 x 4.4 cm.   Most  likely etiologies include endometrial polyp or subserosal pedunculated   fibroid.  2. Junctional zone is somewhat thickened, possible adenomyosis.  3. Hemorrhagic products present within the vagina.  4. Small pelvic free fluid.    < end of copied text >

## 2022-06-20 NOTE — DISCHARGE NOTE PROVIDER - NSDCCPCAREPLAN_GEN_ALL_CORE_FT
PRINCIPAL DISCHARGE DIAGNOSIS  Diagnosis: Fibroid of cervix  Assessment and Plan of Treatment:

## 2022-06-20 NOTE — DISCHARGE NOTE PROVIDER - HOSPITAL COURSE
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 LMP 6/18/2022 and at 3am 6/19/2022 patient started having menorrhagia. On arrival to OSH patient was hypotensive reportedly to 80s systolic and was given fluids. Transferred to [facility] for monitoring off eliquis. Continued heavy bleeding with Hgb 5.1, transfused 2u pRBCs, transferred to gyn, received a cervical myomectomy of 3cm fibroid. Postop.Hb 8.6, bleeding significantly improved. Stroke team approved pt to restart her ASA and plavix. Pt discharged after meeting all postoperative milestones, pain well controlled, bleeding stable, VSS. 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 LMP 6/18/2022 and at 3am 6/19/2022 patient started having menorrhagia. On arrival to OSH patient was hypotensive reportedly to 80s systolic and was given fluids. Transferred to [facility] for monitoring off eliquis. Continued heavy bleeding with Hgb 5.1, transfused  total of 3u pRBCs, 1u of cryo. Transferred to gyn, received a cervical myomectomy of 3cm fibroid. Postop.Hb 8.6, bleeding significantly improved. Stroke team approved pt to restart her ASA and plavix. Pt received IV iron for anemia. Noted to hate RLE edema, dopplers negative. Pt discharged after meeting all postoperative milestones, pain well controlled, bleeding stable, VSS. 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 LMP 6/18/2022 and at 3am 6/19/2022 patient started having menorrhagia. On arrival to OSH patient was hypotensive reportedly to 80s systolic and was given fluids. Transferred to [facility] for monitoring off eliquis. Continued heavy bleeding with Hgb 5.1, transfused  total of 3u pRBCs, 1u of cryo. Transferred to gyn, received a cervical myomectomy of 3cm fibroid. Postop.Hb 8.6, bleeding significantly improved. Stroke team approved pt to restart her ASA and plavix. Pt received IV iron for anemia. Noted to hate RLE edema, dopplers negative. Pt discharged after meeting all postoperative milestones, pain well controlled, bleeding stable, VSS.

## 2022-06-20 NOTE — BRIEF OPERATIVE NOTE - OPERATION/FINDINGS
Anterior lip grasped with tenaculum. 3cm intracervical uterine fibroid removed using traction with Allis clamps. D&C performed. Cervical purse stitch for hemostasis. Surgicel fibrillar x1 placed in cervical canal for further hemostasis.

## 2022-06-20 NOTE — DISCHARGE NOTE PROVIDER - NSDCMRMEDTOKEN_GEN_ALL_CORE_FT
acetaminophen 500 mg oral tablet: 2 tab(s) orally every 6 hours  aspirin 81 mg oral tablet, chewable: 1 tab(s) orally once a day  atorvastatin 80 mg oral tablet: 1 tab(s) orally once a day (at bedtime)  clopidogrel 75 mg oral tablet: 1 tab(s) orally once a day

## 2022-06-20 NOTE — PROGRESS NOTE ADULT - ASSESSMENT
41y Female with PMHx of RMCA CVA (left facial and dysarthria now resolved on May 2022), Known PFO (closure scheduled for 7/1/2022), took one dose of eliquis for stroke prevention for airline flight, presented to [place] ED for syncope and dizziness. There, pt found to be hypotensive and anemic in the setting of severe vaginal bleeding now s/p Kcentra and 2U pRBC's. Pt transferred to GYN surgery for uterine fibroid removal and D&C.    -pt ok to resume antiplatelets once cleared by GYN  -discussed moving up PFO closure appt earlier than July 1 - pt in discussion with [facility] where she is scheduled to get procedure done  -Iron/PRBC replacement per primary team  -stable for discharge from stroke perspective once pt able to stand and walk  -Pt will schedule her own neurology follow up appointment    Case Discussed with Neurology Attending Dr. Oshea

## 2022-06-20 NOTE — PROGRESS NOTE ADULT - SUBJECTIVE AND OBJECTIVE BOX
Neurology Stroke Progress Note    INTERVAL HPI/OVERNIGHT EVENTS:  Overnight pt went to the OR with GYN- uterine fibroid removed, D&C performed. cervical purse stitch for hemostasis.   Patient seen and examined. Pt states she feels a lot better today, but still worried about sitting up and being slightly lightheaded.     MEDICATIONS  (STANDING):  acetaminophen     Tablet .. 1000 milliGRAM(s) Oral every 6 hours  aspirin  chewable 81 milliGRAM(s) Oral daily  atorvastatin 80 milliGRAM(s) Oral at bedtime  clopidogrel Tablet 75 milliGRAM(s) Oral daily    MEDICATIONS  (PRN):  HYDROmorphone  Injectable 0.2 milliGRAM(s) IV Push every 15 minutes PRN Severe Pain (7 - 10)  metoclopramide Injectable 10 milliGRAM(s) IV Push every 6 hours PRN Nausea and/or Vomiting  ondansetron Injectable 8 milliGRAM(s) IV Push every 6 hours PRN Nausea and/or Vomiting  oxyCODONE    IR 5 milliGRAM(s) Oral every 4 hours PRN Moderate Pain (4 - 6)  simethicone 80 milliGRAM(s) Chew every 6 hours PRN Gas      Allergies    No Known Allergies    Intolerances        Vital Signs Last 24 Hrs  T(C): 36.9 (20 Jun 2022 04:30), Max: 37.2 (19 Jun 2022 22:19)  T(F): 98.5 (20 Jun 2022 04:30), Max: 98.9 (19 Jun 2022 22:19)  HR: 74 (20 Jun 2022 08:30) (71 - 102)  BP: 107/55 (20 Jun 2022 08:30) (90/63 - 108/66)  BP(mean): 77 (20 Jun 2022 08:30) (66 - 83)  RR: 20 (20 Jun 2022 08:30) (14 - 22)  SpO2: 98% (20 Jun 2022 08:30) (96% - 100%)    Physical exam:  General: No acute distress, awake and alert  Eyes: Anicteric sclerae, moist conjunctivae, see below for CNs  Neck: trachea midline,  Cardiovascular: Regular rate and rhythm  Pulmonary: Anterior breath sounds clear bilaterally No use of accessory muscles  GI: Abdomen soft, non-distended, non-tender  Extremities: no edema    Neurologic:  -Mental status: Awake, alert, oriented to person, place, and time. Speech is fluent with intact naming, repetition, and comprehension, no dysarthria. Recent and remote memory intact. Follows commands. Attention/concentration intact. Fund of knowledge appropriate.  -Cranial nerves:   II: Visual fields are full to confrontation.  III, IV, VI: Extraocular movements are intact without nystagmus. Pupils equally round and reactive to light  V:  Facial sensation V1-V3 equal and intact   VII: Face is symmetric with normal eye closure and smile  VIII: Hearing is bilaterally intact to finger rub  IX, X: Uvula is midline and soft palate rises symmetrically  XI: Head turning and shoulder shrug are intact.  XII: Tongue protrudes midline  Motor: Normal bulk and tone. No pronator drift. Strength bilateral upper extremity 5/5, bilateral lower extremities 5/5.  Rapid alternating movements intact and symmetric  Sensation: Intact to light touch bilaterally. No neglect or extinction on double simultaneous testing.  Coordination: No dysmetria on finger-to-nose    LABS:                        8.5    5.80  )-----------( 158      ( 20 Jun 2022 05:30 )             25.2     06-20    138  |  108  |  7   ----------------------------<  109<H>  3.9   |  22  |  0.66    Ca    7.7<L>      20 Jun 2022 05:30  Phos  2.4     06-20  Mg     1.7     06-20    TPro  3.8<L>  /  Alb  2.5<L>  /  TBili  0.3  /  DBili  x   /  AST  10  /  ALT  8<L>  /  AlkPhos  29<L>  06-19    PT/INR - ( 20 Jun 2022 10:37 )   PT: 14.5 sec;   INR: 1.22          PTT - ( 20 Jun 2022 10:37 )  PTT:29.2 sec      RADIOLOGY & ADDITIONAL TESTS:     Neurology Stroke Progress Note    INTERVAL HPI/OVERNIGHT EVENTS:  Overnight pt went to the OR with GYN- uterine fibroid removed, D&C performed. cervical purse stitch for hemostasis.   Patient seen and examined. Pt states she feels a lot better today, but still worried about sitting up and being slightly lightheaded.   She said that she will be calling [facility] to move up her surgery for PFO closure. She is worried that her iron levels may be low and asks if her iron can be repleted.    MEDICATIONS  (STANDING):  acetaminophen     Tablet .. 1000 milliGRAM(s) Oral every 6 hours  aspirin  chewable 81 milliGRAM(s) Oral daily  atorvastatin 80 milliGRAM(s) Oral at bedtime  clopidogrel Tablet 75 milliGRAM(s) Oral daily    MEDICATIONS  (PRN):  HYDROmorphone  Injectable 0.2 milliGRAM(s) IV Push every 15 minutes PRN Severe Pain (7 - 10)  metoclopramide Injectable 10 milliGRAM(s) IV Push every 6 hours PRN Nausea and/or Vomiting  ondansetron Injectable 8 milliGRAM(s) IV Push every 6 hours PRN Nausea and/or Vomiting  oxyCODONE    IR 5 milliGRAM(s) Oral every 4 hours PRN Moderate Pain (4 - 6)  simethicone 80 milliGRAM(s) Chew every 6 hours PRN Gas      Allergies    No Known Allergies    Intolerances        Vital Signs Last 24 Hrs  T(C): 36.9 (20 Jun 2022 04:30), Max: 37.2 (19 Jun 2022 22:19)  T(F): 98.5 (20 Jun 2022 04:30), Max: 98.9 (19 Jun 2022 22:19)  HR: 74 (20 Jun 2022 08:30) (71 - 102)  BP: 107/55 (20 Jun 2022 08:30) (90/63 - 108/66)  BP(mean): 77 (20 Jun 2022 08:30) (66 - 83)  RR: 20 (20 Jun 2022 08:30) (14 - 22)  SpO2: 98% (20 Jun 2022 08:30) (96% - 100%)    Physical exam:  General: No acute distress, awake and alert  Eyes: Anicteric sclerae, moist conjunctivae, see below for CNs  Neck: trachea midline,  Cardiovascular: Regular rate and rhythm  Pulmonary: Anterior breath sounds clear bilaterally No use of accessory muscles  GI: Abdomen soft, non-distended, non-tender  Extremities: no edema    Neurologic:  -Mental status: Awake, alert, oriented to person, place, and time. Speech is fluent with intact naming, repetition, and comprehension, no dysarthria. Recent and remote memory intact. Follows commands. Attention/concentration intact. Fund of knowledge appropriate.  -Cranial nerves:   II: Visual fields are full to confrontation.  III, IV, VI: Extraocular movements are intact without nystagmus. Pupils equally round and reactive to light  V:  Facial sensation V1-V3 equal and intact   VII: Face is symmetric with normal eye closure and smile  VIII: Hearing is bilaterally intact to finger rub  IX, X: Uvula is midline and soft palate rises symmetrically  XI: Head turning and shoulder shrug are intact.  XII: Tongue protrudes midline  Motor: Normal bulk and tone. No pronator drift. Strength bilateral upper extremity 5/5, bilateral lower extremities 5/5.  Rapid alternating movements intact and symmetric  Sensation: Intact to light touch bilaterally. No neglect or extinction on double simultaneous testing.  Coordination: No dysmetria on finger-to-nose  NIHSS 0    LABS:                        8.5    5.80  )-----------( 158      ( 20 Jun 2022 05:30 )             25.2     06-20    138  |  108  |  7   ----------------------------<  109<H>  3.9   |  22  |  0.66    Ca    7.7<L>      20 Jun 2022 05:30  Phos  2.4     06-20  Mg     1.7     06-20    TPro  3.8<L>  /  Alb  2.5<L>  /  TBili  0.3  /  DBili  x   /  AST  10  /  ALT  8<L>  /  AlkPhos  29<L>  06-19    PT/INR - ( 20 Jun 2022 10:37 )   PT: 14.5 sec;   INR: 1.22          PTT - ( 20 Jun 2022 10:37 )  PTT:29.2 sec      RADIOLOGY & ADDITIONAL TESTS:

## 2022-06-20 NOTE — DISCHARGE NOTE PROVIDER - NSDCFUADDAPPT_GEN_ALL_CORE_FT
Pelvic rest (nothing in vagina) x6 weeks or unless otherwise instructed by physician. Schedule follow up appointment with Dr. Yepez  in 1-2 weeks. Go to nearest ED if fever >100.4 F, severe abdominal pain or heavy vaginal bleeding.   Wound care: Showering is allowed, no baths. Do not scrub incision area. Pat and dry all areas where incisions are.   Take Motrin 600mg every 6 hours or Tylenol 1000mg every 8 hours as needed for pain  Pelvic rest (nothing in vagina) x6 weeks or unless otherwise instructed by physician. Schedule follow up appointment with Dr. Yepez  in 2 weeks. Go to nearest ED if fever >100.4 F, severe abdominal pain or heavy vaginal bleeding.   Wound care: Showering is allowed, no baths. Do not scrub incision area. Pat and dry all areas where incisions are.   Take Motrin 600mg every 6 hours or Tylenol 1000mg every 8 hours as needed for pain

## 2022-06-20 NOTE — PROGRESS NOTE ADULT - SUBJECTIVE AND OBJECTIVE BOX
GYN Progress Note  POD#0   HD#2    Patient seen and examined at bedside.  No acute events overnight. No acute complaints.  Pain well controlled.  Patient has ambulated to the restroom; voided x 1. No flatus, denies heavy vaginal bleeding (only one pad since surgery).   Denies CP, SOB, N/V, fevers, and chills.    Vital Signs Last 24 Hours  T(C): 36.9 (06-20-22 @ 04:30), Max: 37.2 (06-19-22 @ 22:19)  HR: 75 (06-20-22 @ 04:39) (71 - 102)  BP: 102/60 (06-20-22 @ 04:39) (90/63 - 108/66)  RR: 15 (06-20-22 @ 04:39) (14 - 22)  SpO2: 98% (06-20-22 @ 04:39) (96% - 100%)    I&O's Summary    19 Jun 2022 07:01  -  20 Jun 2022 07:00  --------------------------------------------------------  IN: 2130 mL / OUT: 1250 mL / NET: 880 mL        Physical Exam:  General: NAD  CV: RR, S1, S2  Lungs: CTA b/l, good air flow b/l   Abdomen: Soft,non-tender   : scant blood on pad   Ext: warm and well perfused    Labs:                        8.5    5.80  )-----------( 158      ( 20 Jun 2022 05:30 )             25.2   baso 0.2    eos 0.7    imm gran 0.5    lymph 11.9   mono 1.4    poly 85.3                         6.6    5.48  )-----------( 179      ( 19 Jun 2022 22:00 )             21.0   baso x      eos x      imm gran x      lymph x      mono x      poly x                            5.1    5.02  )-----------( 144      ( 19 Jun 2022 16:03 )             16.4   baso 1.8    eos 0.0    imm gran x      lymph 14.9   mono 2.6    poly 80.7       MEDICATIONS  (STANDING):  acetaminophen     Tablet .. 1000 milliGRAM(s) Oral every 6 hours  atorvastatin 80 milliGRAM(s) Oral at bedtime  lactated ringers. 1000 milliLiter(s) (110 mL/Hr) IV Continuous <Continuous>  sodium chloride 0.9%. 1000 milliLiter(s) (125 mL/Hr) IV Continuous <Continuous>    MEDICATIONS  (PRN):  HYDROmorphone  Injectable 0.2 milliGRAM(s) IV Push every 15 minutes PRN Severe Pain (7 - 10)  metoclopramide Injectable 10 milliGRAM(s) IV Push every 6 hours PRN Nausea and/or Vomiting  ondansetron Injectable 8 milliGRAM(s) IV Push every 6 hours PRN Nausea and/or Vomiting  oxyCODONE    IR 5 milliGRAM(s) Oral every 4 hours PRN Moderate Pain (4 - 6)  simethicone 80 milliGRAM(s) Chew every 6 hours PRN Gas

## 2022-06-20 NOTE — PROGRESS NOTE ADULT - ASSESSMENT
A/P: 41y, PMH of POD#0  s/p              , EBL.  Patient is stable and doing well.  Patient is meeting all post op milestones.      Neuro: Pain well controlled, continue on PO pain medications  CV: hemodynamically stable, monitor vitals  Pulm: saturating well on room air, encourage oob/amb  GI: tolerating regular diet, SLIV  : voiding spontaneously, urine output adequate  Heme: SCDs for DVT ppx, f/u AM H&H, early ambulation  ID: afebrile  Dispo: continue routine post-op care      - Incomplete note -  A/P: 41y, PMHx of prior ischemic stroke in 5/2022 in the setting of a PFO (scheduled for closure in 7/2022) who presented with heavy vaginal bleeding after stating Eliquis (started for prophylaxis for long flight) during her period. On imaging evaluation with MRI 6/19, there was an intracavitary, pedunculated / cervical fibroid. Patient initially admitted to neurology however was transferred to Gyn for persistent heavy vaginal bleeding.     She is now #POD 0 s/p dilation and curettage, myomectomy on 6/20 AM with much improvement in vaginal bleeding. Patient s/p 3uPRBC and 1u Cryo. AM Hb 8.6, bleeding minimal. Will discuss restarting home AC (Plavix and ASA).     Neuro: Tylenol PRN; holding NSAIDs   CV: hemodynamically stable, monitor vitals  Pulm: saturating well on room air, encourage oob/amb  GI: Regular diet; heplock IV   : s/p chaves, voiding spontaneously   Heme: s/p 3uPRBC, 1u cryo for heavy vaginal bleeding. She is   ID: afebrile  Dispo: continue routine post-op care      - Incomplete note -  A/P: 41y, PMHx of prior ischemic stroke in 5/2022 in the setting of a PFO (scheduled for closure in 7/2022) who presented with heavy vaginal bleeding after stating Eliquis (started for prophylaxis for long flight) during her period. On imaging evaluation with MRI 6/19, there was an intracavitary, pedunculated / cervical fibroid. Patient initially admitted to neurology however was transferred to Gyn for persistent heavy vaginal bleeding.     She is now #POD 0 s/p dilation and curettage, myomectomy on 6/20 AM with much improvement in vaginal bleeding. Patient s/p 3uPRBC and 1u Cryo. AM Hb 8.6, bleeding minimal. Will discuss restarting home AC (Plavix and ASA).     Neuro: Tylenol PRN; holding NSAIDs   CV: hemodynamically stable, monitor vitals  Pulm: saturating well on room air, encourage oob/amb  GI: Regular diet; heplock IV   : s/p chaves, voiding spontaneously   Heme: s/p 3uPRBC, 1u cryo for heavy vaginal bleeding; will f/u AM fibrinogen, will discuss restarting antiplatelet therapy with Stroke team   ID: afebrile  Dispo: discharge planning       KL PGY3

## 2022-06-21 ENCOUNTER — TRANSCRIPTION ENCOUNTER (OUTPATIENT)
Age: 42
End: 2022-06-21

## 2022-06-21 VITALS — TEMPERATURE: 98 F

## 2022-06-21 LAB
HCT VFR BLD CALC: 21.1 % — LOW (ref 34.5–45)
HGB BLD-MCNC: 7.2 G/DL — LOW (ref 11.5–15.5)
MCHC RBC-ENTMCNC: 29.9 PG — SIGNIFICANT CHANGE UP (ref 27–34)
MCHC RBC-ENTMCNC: 34.1 GM/DL — SIGNIFICANT CHANGE UP (ref 32–36)
MCV RBC AUTO: 87.6 FL — SIGNIFICANT CHANGE UP (ref 80–100)
NRBC # BLD: 0 /100 WBCS — SIGNIFICANT CHANGE UP (ref 0–0)
PLATELET # BLD AUTO: 142 K/UL — LOW (ref 150–400)
RBC # BLD: 2.41 M/UL — LOW (ref 3.8–5.2)
RBC # FLD: 20.6 % — HIGH (ref 10.3–14.5)
WBC # BLD: 8.44 K/UL — SIGNIFICANT CHANGE UP (ref 3.8–10.5)
WBC # FLD AUTO: 8.44 K/UL — SIGNIFICANT CHANGE UP (ref 3.8–10.5)

## 2022-06-21 RX ORDER — SODIUM,POTASSIUM PHOSPHATES 278-250MG
1 POWDER IN PACKET (EA) ORAL
Refills: 0 | Status: COMPLETED | OUTPATIENT
Start: 2022-06-21 | End: 2022-06-21

## 2022-06-21 RX ORDER — SODIUM,POTASSIUM PHOSPHATES 278-250MG
1 POWDER IN PACKET (EA) ORAL
Refills: 0 | Status: DISCONTINUED | OUTPATIENT
Start: 2022-06-21 | End: 2022-06-21

## 2022-06-21 RX ORDER — POTASSIUM PHOSPHATE, MONOBASIC POTASSIUM PHOSPHATE, DIBASIC 236; 224 MG/ML; MG/ML
15 INJECTION, SOLUTION INTRAVENOUS ONCE
Refills: 0 | Status: COMPLETED | OUTPATIENT
Start: 2022-06-21 | End: 2022-06-21

## 2022-06-21 RX ADMIN — Medication 81 MILLIGRAM(S): at 12:41

## 2022-06-21 RX ADMIN — FAMOTIDINE 20 MILLIGRAM(S): 10 INJECTION INTRAVENOUS at 05:23

## 2022-06-21 RX ADMIN — CLOPIDOGREL BISULFATE 75 MILLIGRAM(S): 75 TABLET, FILM COATED ORAL at 12:41

## 2022-06-21 RX ADMIN — Medication 1 PACKET(S): at 12:41

## 2022-06-21 RX ADMIN — Medication 1 PACKET(S): at 10:06

## 2022-06-21 NOTE — DISCHARGE NOTE NURSING/CASE MANAGEMENT/SOCIAL WORK - NSDCFUADDAPPT_GEN_ALL_CORE_FT
Pelvic rest (nothing in vagina) x6 weeks or unless otherwise instructed by physician. Schedule follow up appointment with Dr. Yepez  in 2 weeks. Go to nearest ED if fever >100.4 F, severe abdominal pain or heavy vaginal bleeding.   Wound care: Showering is allowed, no baths. Do not scrub incision area. Pat and dry all areas where incisions are.   Take Motrin 600mg every 6 hours or Tylenol 1000mg every 8 hours as needed for pain

## 2022-06-21 NOTE — CHART NOTE - NSCHARTNOTEFT_GEN_A_CORE
06-21-22 @ 07:22    To Whom It May Concern:    On behalf of Ms. ANTONY MERCADO,    Please use this letter as verfication that the above patient was hospitalized at [facility] from 6/19/22-6/21/22 . Please excuse until while she is recovering.    If any questions or concerns please call [phone].         Best Regards,    Dr. Janice Cervantes MD.  [facility]  OB/GYN Department   [address] [place] [id number]  [phone]

## 2022-06-21 NOTE — DISCHARGE NOTE NURSING/CASE MANAGEMENT/SOCIAL WORK - PATIENT PORTAL LINK FT
You can access the FollowMyHealth Patient Portal offered by [facility] by registering at the following website: [email or web address]. By joining [employer]’s FollowMyHealth portal, you will also be able to view your health information using other applications (apps) compatible with our system.

## 2022-06-21 NOTE — PROGRESS NOTE ADULT - ASSESSMENT
A/P: 41y, PMHx of prior ischemic stroke in 5/2022 in the setting of a PFO (scheduled for closure in 7/2022) who presented with heavy vaginal bleeding after stating Eliquis (started for prophylaxis for long flight) during her period. On imaging evaluation with MRI 6/19, there was an intracavitary, pedunculated / cervical fibroid. Patient initially admitted to neurology however was transferred to Gyn for persistent heavy vaginal bleeding.     She is now #POD 1 s/p dilation and curettage, myomectomy on 6/20 AM with much improvement in vaginal bleeding. Patient s/p 3uPRBC and 1u Cryo. AM Hb 7.2 (from 8.5 yesterday), but bleeding is minimal, likely equilibration. Patient started on home antiplatelet medications yesterday as well as seen by the stroke team; she is stable and doing well postoperatively.     Neuro: Tylenol PRN; holding NSAIDs   CV: hemodynamically stable, monitor vitals  Pulm: saturating well on room air, encourage oob/amb  GI: Regular diet; IV heplocked   : s/p chaves, voiding spontaneously   Heme: s/p 3uPRBC, 1u cryo for heavy vaginal bleeding; will f/u AM fibrinogen, will discuss restarting antiplatelet therapy with Stroke team   ID: afebrile  Dispo: d/zina HALEY PGY3

## 2022-06-21 NOTE — PROGRESS NOTE ADULT - SUBJECTIVE AND OBJECTIVE BOX
GYN Progress Note  POD#1   HD#3    Patient seen and examined at bedside.  No acute events overnight. No acute complaints.  Pain well controlled.  Patient is ambulating and tolerating regular diet.   Passing flatus.     Voiding spontaneously, denies heavy vaginal bleeding.   Denies CP, SOB, N/V, fevers, and chills.    Vital Signs Last 24 Hours  T(C): 36.8 (06-21-22 @ 05:46), Max: 37.1 (06-20-22 @ 21:07)  HR: 74 (06-21-22 @ 04:23) (67 - 74)  BP: 97/54 (06-21-22 @ 04:23) (97/54 - 109/53)  RR: 18 (06-21-22 @ 04:23) (17 - 20)  SpO2: 98% (06-21-22 @ 04:23) (95% - 99%)    I&O's Summary    20 Jun 2022 07:01  -  21 Jun 2022 07:00  --------------------------------------------------------  IN: 325 mL / OUT: 1450 mL / NET: -1125 mL        Physical Exam:  General: NAD  CV: RR, S1, S2  Lungs: CTA b/l, good air flow b/l   Abdomen: Soft, nontender   : no bleeding on pad  Ext: warm and well perfused    Labs:                        7.2    8.44  )-----------( 142      ( 21 Jun 2022 05:30 )             21.1   baso x      eos x      imm gran x      lymph x      mono x      poly x                            8.5    5.80  )-----------( 158      ( 20 Jun 2022 05:30 )             25.2   baso 0.2    eos 0.7    imm gran 0.5    lymph 11.9   mono 1.4    poly 85.3     MEDICATIONS  (STANDING):  aspirin  chewable 81 milliGRAM(s) Oral daily  atorvastatin 80 milliGRAM(s) Oral at bedtime  clopidogrel Tablet 75 milliGRAM(s) Oral daily  famotidine    Tablet 20 milliGRAM(s) Oral every 12 hours  potassium phosphate IVPB 15 milliMole(s) IV Intermittent once    MEDICATIONS  (PRN):  HYDROmorphone  Injectable 0.2 milliGRAM(s) IV Push every 15 minutes PRN Severe Pain (7 - 10)  metoclopramide Injectable 10 milliGRAM(s) IV Push every 6 hours PRN Nausea and/or Vomiting  ondansetron Injectable 8 milliGRAM(s) IV Push every 6 hours PRN Nausea and/or Vomiting  oxyCODONE    IR 5 milliGRAM(s) Oral every 4 hours PRN Moderate Pain (4 - 6)  simethicone 80 milliGRAM(s) Chew every 6 hours PRN Gas

## 2022-06-21 NOTE — DISCHARGE NOTE NURSING/CASE MANAGEMENT/SOCIAL WORK - NSDCPEFALRISK_GEN_ALL_CORE
For information on Fall & Injury Prevention, visit: [email or web address] OR  [email or web address] OR  https://www.cdc.gov/steadi/patient.html

## 2022-06-22 ENCOUNTER — EMERGENCY (EMERGENCY)
Facility: HOSPITAL | Age: 42
LOS: 1 days | Discharge: HOME | End: 2022-06-22
Attending: EMERGENCY MEDICINE | Admitting: EMERGENCY MEDICINE
Payer: COMMERCIAL

## 2022-06-22 VITALS
DIASTOLIC BLOOD PRESSURE: 69 MMHG | SYSTOLIC BLOOD PRESSURE: 105 MMHG | RESPIRATION RATE: 16 BRPM | HEART RATE: 74 BPM | OXYGEN SATURATION: 100 % | TEMPERATURE: 98 F

## 2022-06-22 VITALS
DIASTOLIC BLOOD PRESSURE: 70 MMHG | OXYGEN SATURATION: 100 % | HEIGHT: 62 IN | TEMPERATURE: 98 F | WEIGHT: 110.01 LBS | RESPIRATION RATE: 16 BRPM | SYSTOLIC BLOOD PRESSURE: 106 MMHG | HEART RATE: 88 BPM

## 2022-06-22 DIAGNOSIS — Q21.1 ATRIAL SEPTAL DEFECT: ICD-10-CM

## 2022-06-22 DIAGNOSIS — Z86.73 PERSONAL HISTORY OF TRANSIENT ISCHEMIC ATTACK (TIA), AND CEREBRAL INFARCTION WITHOUT RESIDUAL DEFICITS: ICD-10-CM

## 2022-06-22 DIAGNOSIS — R42 DIZZINESS AND GIDDINESS: ICD-10-CM

## 2022-06-22 DIAGNOSIS — R00.0 TACHYCARDIA, UNSPECIFIED: ICD-10-CM

## 2022-06-22 DIAGNOSIS — Z20.822 CONTACT WITH AND (SUSPECTED) EXPOSURE TO COVID-19: ICD-10-CM

## 2022-06-22 DIAGNOSIS — R06.00 DYSPNEA, UNSPECIFIED: ICD-10-CM

## 2022-06-22 DIAGNOSIS — R55 SYNCOPE AND COLLAPSE: ICD-10-CM

## 2022-06-22 DIAGNOSIS — M25.569 PAIN IN UNSPECIFIED KNEE: Chronic | ICD-10-CM

## 2022-06-22 DIAGNOSIS — Z79.82 LONG TERM (CURRENT) USE OF ASPIRIN: ICD-10-CM

## 2022-06-22 DIAGNOSIS — R07.9 CHEST PAIN, UNSPECIFIED: ICD-10-CM

## 2022-06-22 DIAGNOSIS — Z98.890 OTHER SPECIFIED POSTPROCEDURAL STATES: Chronic | ICD-10-CM

## 2022-06-22 LAB
ALBUMIN SERPL ELPH-MCNC: 4.3 G/DL — SIGNIFICANT CHANGE UP (ref 3.3–5)
ALP SERPL-CCNC: 45 U/L — SIGNIFICANT CHANGE UP (ref 40–120)
ALT FLD-CCNC: 18 U/L — SIGNIFICANT CHANGE UP (ref 10–45)
ANION GAP SERPL CALC-SCNC: 10 MMOL/L — SIGNIFICANT CHANGE UP (ref 5–17)
ANISOCYTOSIS BLD QL: SLIGHT — SIGNIFICANT CHANGE UP
APPEARANCE UR: CLEAR — SIGNIFICANT CHANGE UP
APTT BLD: 29 SEC — SIGNIFICANT CHANGE UP (ref 27.5–35.5)
AST SERPL-CCNC: 29 U/L — SIGNIFICANT CHANGE UP (ref 10–40)
BASOPHILS # BLD AUTO: 0.07 K/UL — SIGNIFICANT CHANGE UP (ref 0–0.2)
BASOPHILS NFR BLD AUTO: 0.9 % — SIGNIFICANT CHANGE UP (ref 0–2)
BILIRUB SERPL-MCNC: 0.4 MG/DL — SIGNIFICANT CHANGE UP (ref 0.2–1.2)
BILIRUB UR-MCNC: NEGATIVE — SIGNIFICANT CHANGE UP
BLD GP AB SCN SERPL QL: NEGATIVE — SIGNIFICANT CHANGE UP
BUN SERPL-MCNC: 8 MG/DL — SIGNIFICANT CHANGE UP (ref 7–23)
CALCIUM SERPL-MCNC: 9.1 MG/DL — SIGNIFICANT CHANGE UP (ref 8.4–10.5)
CHLORIDE SERPL-SCNC: 103 MMOL/L — SIGNIFICANT CHANGE UP (ref 96–108)
CO2 SERPL-SCNC: 29 MMOL/L — SIGNIFICANT CHANGE UP (ref 22–31)
COLOR SPEC: YELLOW — SIGNIFICANT CHANGE UP
CREAT SERPL-MCNC: 0.73 MG/DL — SIGNIFICANT CHANGE UP (ref 0.5–1.3)
DIFF PNL FLD: NEGATIVE — SIGNIFICANT CHANGE UP
EGFR: 105 ML/MIN/1.73M2 — SIGNIFICANT CHANGE UP
EOSINOPHIL # BLD AUTO: 0.27 K/UL — SIGNIFICANT CHANGE UP (ref 0–0.5)
EOSINOPHIL NFR BLD AUTO: 3.5 % — SIGNIFICANT CHANGE UP (ref 0–6)
GIANT PLATELETS BLD QL SMEAR: PRESENT — SIGNIFICANT CHANGE UP
GLUCOSE SERPL-MCNC: 99 MG/DL — SIGNIFICANT CHANGE UP (ref 70–99)
GLUCOSE UR QL: NEGATIVE — SIGNIFICANT CHANGE UP
HCG UR QL: NEGATIVE — SIGNIFICANT CHANGE UP
HCT VFR BLD CALC: 27.8 % — LOW (ref 34.5–45)
HGB BLD-MCNC: 9 G/DL — LOW (ref 11.5–15.5)
HYPOCHROMIA BLD QL: SLIGHT — SIGNIFICANT CHANGE UP
INR BLD: 0.97 — SIGNIFICANT CHANGE UP (ref 0.88–1.16)
KETONES UR-MCNC: NEGATIVE — SIGNIFICANT CHANGE UP
LEUKOCYTE ESTERASE UR-ACNC: NEGATIVE — SIGNIFICANT CHANGE UP
LYMPHOCYTES # BLD AUTO: 2.49 K/UL — SIGNIFICANT CHANGE UP (ref 1–3.3)
LYMPHOCYTES # BLD AUTO: 31.9 % — SIGNIFICANT CHANGE UP (ref 13–44)
MACROCYTES BLD QL: SLIGHT — SIGNIFICANT CHANGE UP
MANUAL SMEAR VERIFICATION: SIGNIFICANT CHANGE UP
MCHC RBC-ENTMCNC: 29.5 PG — SIGNIFICANT CHANGE UP (ref 27–34)
MCHC RBC-ENTMCNC: 32.4 GM/DL — SIGNIFICANT CHANGE UP (ref 32–36)
MCV RBC AUTO: 91.1 FL — SIGNIFICANT CHANGE UP (ref 80–100)
MICROCYTES BLD QL: SLIGHT — SIGNIFICANT CHANGE UP
MONOCYTES # BLD AUTO: 0.27 K/UL — SIGNIFICANT CHANGE UP (ref 0–0.9)
MONOCYTES NFR BLD AUTO: 3.5 % — SIGNIFICANT CHANGE UP (ref 2–14)
NEUTROPHILS # BLD AUTO: 4.71 K/UL — SIGNIFICANT CHANGE UP (ref 1.8–7.4)
NEUTROPHILS NFR BLD AUTO: 60.2 % — SIGNIFICANT CHANGE UP (ref 43–77)
NITRITE UR-MCNC: NEGATIVE — SIGNIFICANT CHANGE UP
OVALOCYTES BLD QL SMEAR: SLIGHT — SIGNIFICANT CHANGE UP
PH UR: 7 — SIGNIFICANT CHANGE UP (ref 5–8)
PLAT MORPH BLD: ABNORMAL
PLATELET # BLD AUTO: 211 K/UL — SIGNIFICANT CHANGE UP (ref 150–400)
POLYCHROMASIA BLD QL SMEAR: SLIGHT — SIGNIFICANT CHANGE UP
POTASSIUM SERPL-MCNC: 4.1 MMOL/L — SIGNIFICANT CHANGE UP (ref 3.5–5.3)
POTASSIUM SERPL-SCNC: 4.1 MMOL/L — SIGNIFICANT CHANGE UP (ref 3.5–5.3)
PROT SERPL-MCNC: 6.7 G/DL — SIGNIFICANT CHANGE UP (ref 6–8.3)
PROT UR-MCNC: NEGATIVE MG/DL — SIGNIFICANT CHANGE UP
PROTHROM AB SERPL-ACNC: 11.5 SEC — SIGNIFICANT CHANGE UP (ref 10.5–13.4)
RBC # BLD: 3.05 M/UL — LOW (ref 3.8–5.2)
RBC # FLD: 20.8 % — HIGH (ref 10.3–14.5)
RBC BLD AUTO: ABNORMAL
RH IG SCN BLD-IMP: POSITIVE — SIGNIFICANT CHANGE UP
SARS-COV-2 RNA SPEC QL NAA+PROBE: NEGATIVE — SIGNIFICANT CHANGE UP
SCHISTOCYTES BLD QL AUTO: SLIGHT — SIGNIFICANT CHANGE UP
SMUDGE CELLS # BLD: PRESENT — SIGNIFICANT CHANGE UP
SODIUM SERPL-SCNC: 142 MMOL/L — SIGNIFICANT CHANGE UP (ref 135–145)
SP GR SPEC: <=1.005 — SIGNIFICANT CHANGE UP (ref 1–1.03)
SPHEROCYTES BLD QL SMEAR: SLIGHT — SIGNIFICANT CHANGE UP
TROPONIN T SERPL-MCNC: <0.01 NG/ML — SIGNIFICANT CHANGE UP (ref 0–0.01)
UROBILINOGEN FLD QL: 0.2 E.U./DL — SIGNIFICANT CHANGE UP
WBC # BLD: 7.82 K/UL — SIGNIFICANT CHANGE UP (ref 3.8–10.5)
WBC # FLD AUTO: 7.82 K/UL — SIGNIFICANT CHANGE UP (ref 3.8–10.5)

## 2022-06-22 PROCEDURE — 84484 ASSAY OF TROPONIN QUANT: CPT

## 2022-06-22 PROCEDURE — 80053 COMPREHEN METABOLIC PANEL: CPT

## 2022-06-22 PROCEDURE — 96360 HYDRATION IV INFUSION INIT: CPT

## 2022-06-22 PROCEDURE — 87635 SARS-COV-2 COVID-19 AMP PRB: CPT

## 2022-06-22 PROCEDURE — 86900 BLOOD TYPING SEROLOGIC ABO: CPT

## 2022-06-22 PROCEDURE — 85730 THROMBOPLASTIN TIME PARTIAL: CPT

## 2022-06-22 PROCEDURE — 71045 X-RAY EXAM CHEST 1 VIEW: CPT

## 2022-06-22 PROCEDURE — 93010 ELECTROCARDIOGRAM REPORT: CPT

## 2022-06-22 PROCEDURE — 85025 COMPLETE CBC W/AUTO DIFF WBC: CPT

## 2022-06-22 PROCEDURE — 93005 ELECTROCARDIOGRAM TRACING: CPT

## 2022-06-22 PROCEDURE — 71045 X-RAY EXAM CHEST 1 VIEW: CPT | Mod: 26

## 2022-06-22 PROCEDURE — 81003 URINALYSIS AUTO W/O SCOPE: CPT

## 2022-06-22 PROCEDURE — 82962 GLUCOSE BLOOD TEST: CPT

## 2022-06-22 PROCEDURE — 36415 COLL VENOUS BLD VENIPUNCTURE: CPT

## 2022-06-22 PROCEDURE — 81025 URINE PREGNANCY TEST: CPT

## 2022-06-22 PROCEDURE — 86901 BLOOD TYPING SEROLOGIC RH(D): CPT

## 2022-06-22 PROCEDURE — 86850 RBC ANTIBODY SCREEN: CPT

## 2022-06-22 PROCEDURE — 36430 TRANSFUSION BLD/BLD COMPNT: CPT

## 2022-06-22 PROCEDURE — 99285 EMERGENCY DEPT VISIT HI MDM: CPT | Mod: 25

## 2022-06-22 PROCEDURE — 85610 PROTHROMBIN TIME: CPT

## 2022-06-22 RX ORDER — SODIUM CHLORIDE 9 MG/ML
1000 INJECTION INTRAMUSCULAR; INTRAVENOUS; SUBCUTANEOUS ONCE
Refills: 0 | Status: COMPLETED | OUTPATIENT
Start: 2022-06-22 | End: 2022-06-22

## 2022-06-22 RX ORDER — SODIUM CHLORIDE 9 MG/ML
500 INJECTION INTRAMUSCULAR; INTRAVENOUS; SUBCUTANEOUS ONCE
Refills: 0 | Status: COMPLETED | OUTPATIENT
Start: 2022-06-22 | End: 2022-06-22

## 2022-06-22 RX ADMIN — SODIUM CHLORIDE 1000 MILLILITER(S): 9 INJECTION INTRAMUSCULAR; INTRAVENOUS; SUBCUTANEOUS at 12:59

## 2022-06-22 RX ADMIN — SODIUM CHLORIDE 1000 MILLILITER(S): 9 INJECTION INTRAMUSCULAR; INTRAVENOUS; SUBCUTANEOUS at 11:24

## 2022-06-22 RX ADMIN — SODIUM CHLORIDE 500 MILLILITER(S): 9 INJECTION INTRAMUSCULAR; INTRAVENOUS; SUBCUTANEOUS at 12:59

## 2022-06-22 NOTE — ED PROVIDER NOTE - PATIENT PORTAL LINK FT
You can access the FollowMyHealth Patient Portal offered by [facility] by registering at the following website: [email or web address]. By joining [employer]’s FollowMyHealth portal, you will also be able to view your health information using other applications (apps) compatible with our system.

## 2022-06-22 NOTE — CONSULT NOTE ADULT - SUBJECTIVE AND OBJECTIVE BOX
42yo with PMHx of prior ischemic stroke in 2022 in the setting of a PFO (scheduled for closure in 2022), now POD2 s/p myomectomy of cervical fibroid, after initially presenting with heavy vaginal bleeding and symptomatic anemia, now presenting with 1 day of dyspnea on exertion.  Pt initially presented  with menorrhagia in the setting of recently starting Eliquis At that time her Hb was 5.1. She received a total of 3u of pRBCs and 1u of cryo and went for a dilation and curettage, cervical fibroid myomectomy on  AM with significant improvement in vaginal bleeding.  Pt was discharged  with Hb 7.2, minimal bleeding, and asymptomatic anemia. Pt reports that today she started feeling dyspnea with walking and speaking. She then had a presyncopal episode while she was sitting down and eating, during which she felt lightheaded, chest pressure, and palpitations. Pt expresses concern that the sxs may be secondary to anemia. Denies n/v, chest pain, current chest pressure, raditating chest pain, cough, abdominal pain, focal neurologic deficits. Since being discharged from the hospital she has had minimal brown vaginal spotting, no bright red vaginal bleeding. She has restarted her ASA and plavix as instructed.     Obhx:  term  c/b PEC w/SF,  term ,  term , 2013 VTOP D+C, 2019 term  c/b vanishing twin,  medical VTOP,  SAB  Gynhx: hx fibroids   PMH: hx ischemic stroke  as per HPI. postive for antiphospholipid antibody syndrome (anti-cardiolipin antibody elevated on 22. Followed by Dr. Paul of hematology), vestibular migraines  meds: ASA 81 qd, plavix 75 qd, famotidine bid, PNV (for iron), lipitor 40mg qd  PSH: cervical myomectomy  as above  all: nkda  social: denies       PHYSICAL EXAM:   Vital Signs Last 24 Hrs  T(C): 36.8 (2022 10:43), Max: 36.8 (2022 10:43)  T(F): 98.2 (2022 10:43), Max: 98.2 (2022 10:43)  HR: 88 (2022 10:43) (88 - 88)  BP: 106/70 (2022 10:43) (106/70 - 106/70)  BP(mean): --  RR: 16 (2022 10:43) (16 - 16)  SpO2: 100% (2022 10:43) (100% - 100%)    **************************  Constitutional: Alert & Oriented x3, No acute distress  Respiratory: Clear to ausculation bilaterally; no wheezing, rhonchi, or crackles  Cardiovascular: regular rate and rhythm, no murmurs, or gallops  Gastrointestinal: soft, non tender, positive bowel sounds, no rebound or guarding   Pelvic exam: deferred - current underwear liner with no evidence of old or current vaginal bleeding   Extremities: no calf tenderness or swelling    LABS:                        9.0    7.82  )-----------( 211      ( 2022 10:52 )             27.8     06-    142  |  103  |  x   ----------------------------<  x   4.1   |  x   |  x         PT/INR - ( 2022 10:52 )   PT: 11.5 sec;   INR: 0.97          PTT - ( 2022 10:52 )  PTT:29.0 sec  Urinalysis Basic - ( 2022 10:52 )    Color: Yellow / Appearance: Clear / SG: <=1.005 / pH: x  Gluc: x / Ketone: NEGATIVE  / Bili: Negative / Urobili: 0.2 E.U./dL   Blood: x / Protein: NEGATIVE mg/dL / Nitrite: NEGATIVE   Leuk Esterase: NEGATIVE / RBC: x / WBC x   Sq Epi: x / Non Sq Epi: x / Bacteria: x    · EKG Date/Time: 2022 10:38  · Rate: 100  · Interpretation: sinus tachycardia  · NM: 134  · QRS: 82  · ST/T Wave: Slight ST depressions at leads V4-V6 and inferior leads.  · Other Findings: QTc 438  · Prior EKG Status: the EKG is unchanged from prior EKG        RADIOLOGY & ADDITIONAL STUDIES:

## 2022-06-22 NOTE — CONSULT NOTE ADULT - ASSESSMENT
42yo with PMHx of prior ischemic stroke in 5/2022 in the setting of a PFO (scheduled for closure in 7/2022), now POD2 s/p myomectomy of cervical fibroid, after initially presenting with heavy vaginal bleeding and symptomatic anemia, now presenting with 1 day of dyspnea on exertion.  -VSS, HR 88, not tachycardic, hemodynamically stable   -Benign physical exam without concern for ongoing vaginal bleeding  -Hb 9.0, improved from discharge Hb of 7.2  -EKG WNL, Trop neg, no current chest pressure/pain/SOB  -Recommend CXR for chief complaint of SOB as well as orthostatics   -Final recommendations pending  42yo with PMHx of prior ischemic stroke in 5/2022 in the setting of a PFO (scheduled for closure in 7/2022), now POD2 s/p myomectomy of cervical fibroid, after initially presenting with heavy vaginal bleeding and symptomatic anemia, now presenting with 1 day of dyspnea on exertion.  -VSS, HR 88, not tachycardic, hemodynamically stable   -Benign physical exam without concern for ongoing vaginal bleeding  -Hb 9.0, improved from discharge Hb of 7.2  -EKG WNL, Trop neg, no current chest pressure/pain/SOB  -Recommend CXR for chief complaint of SOB as well as orthostatics   -Final recommendations pending       Addendum: CXR clear, negative orthostatics. Pt reports feeling improved after 1L bolus. Pt examined and seen with Dr. Sparrow. No acute GYN intervention at this time. As pt is not vaginally bleeding, can restart ASA/Plavix from a GYN perspective. Pt to f/u in the office in two weeks.

## 2022-06-22 NOTE — ED PROVIDER NOTE - CLINICAL SUMMARY MEDICAL DECISION MAKING FREE TEXT BOX
Impression: Hx of RMCA CVA (May 2022), known PFO (closure scheduled for 7/1/2022), on Eliquis, s/p recent admission for anemia secondary to heavy vaginal bleeding, s/p myomectomy and PRBC and cryo transfusions, here now w/ worsening symptoms of dizziness, lightheadedness, and near syncope this morning. Exertional dyspnea and intermittent CP. Afebrile. Hemodynamically stable. Plan for labs and consult GYN. Impression: Hx of RMCA CVA (May 2022), known PFO (closure scheduled for 7/1/2022), on Eliquis, s/p recent admission for anemia secondary to heavy vaginal bleeding, s/p myomectomy and PRBC and cryo transfusions, here now w/ worsening symptoms of dizziness, lightheadedness, and near syncope this morning. exertional dyspnea and intermittent CP. Afebrile. Hemodynamically stable. Plan for labs and consult GYN.    Labs reassuring w/ hg of 9 which is improved from yesterday. Electrolytes and trop wnl. No stemi on ekg. CXR neg for i/e/chf. Pt hydrated with ivf and feeling improved. No dizziness upon standing for orthostatic vs which was neg as well. Pt seen by gyn and deemed stable for dc. Suspect dehydration. ED evaluation and management discussed with the patient and family in detail.  Close PMD and gyn follow up encouraged.  Strict ED return instructions discussed in detail and patient given the opportunity to ask any questions about their discharge diagnosis and instructions. Patient verbalized understanding.

## 2022-06-22 NOTE — ED ADULT NURSE NOTE - OBJECTIVE STATEMENT
42 y/o female  c/o low hemoglobin of 7.1 associated w/ weakness, near syncopal episode this morning, SOB, and chest discomfort. Pt w/ mild spotting. Pt had emergency surgery 3 days ago for heavy vaginal bleeding/uterine fibroid. Pt received 3 units of PRBCs and 1 unit of cryo. denies fever, chills, NVD, urinary sx. pt had stroke in May3, 2022 with no residual.

## 2022-06-22 NOTE — ED ADULT TRIAGE NOTE - HEIGHT IN CM
Progress Note  Nephrology    Admit Date: 4/27/2017   LOS: 3 days     SUBJECTIVE:     Follow-up For: hyperkalemia; HUMERA on CKD    Interval Hx: transferred to floor yesterday. No events overnight. Remains without appetite. Reports that he drank 20 oz water yesterday. Denies SOB.   UOP 1.15L/24h.     OBJECTIVE:     Vital Signs (Most Recent)  Temp: 98.2 °F (36.8 °C) (05/01/17 1130)  Pulse: 60 (05/01/17 1130)  Resp: 16 (05/01/17 1130)  BP: (!) 156/69 (05/01/17 1130)  SpO2: 96 % (05/01/17 1130)    Vital Signs Range (Last 24H):  Temp:  [98 °F (36.7 °C)-98.8 °F (37.1 °C)]   Pulse:  [60-78]   Resp:  [16-25]   BP: (110-173)/()   SpO2:  [96 %-100 %]       Intake/Output Summary (Last 24 hours) at 05/01/17 1245  Last data filed at 04/30/17 2335   Gross per 24 hour   Intake                0 ml   Output              775 ml   Net             -775 ml     Physical Exam:  Gen: WDWN AAM in NAD.  Neuro: disoriented  Eyes: EOMI, clear conjunctivae  CV: RRR. No peripheral edema  Resp: normal effort. No crackles  Abd: Soft, NTND  Skin: warm, normal turgor      Laboratory:  CBC:     Recent Labs  Lab 05/01/17  0700   WBC 15.22*   RBC 3.77*   HGB 10.8*   HCT 31.9*   *   MCV 85   MCH 28.6   MCHC 33.9     CMP:   Recent Labs  Lab 04/27/17  2257  05/01/17  0700   GLU 91  < > 96   CALCIUM 9.4  < > 9.1   ALBUMIN 3.2*  --   --    PROT 7.8  --   --      < > 150*   K 7.0*  < > 5.3*   CO2 14*  < > 23   *  < > 119*   *  < > 50*   CREATININE 6.2*  < > 2.2*   ALKPHOS 93  --   --    ALT 27  --   --    AST 55*  --   --    BILITOT 0.6  --   --    < > = values in this interval not displayed.  ABGs:     Recent Labs  Lab 04/28/17  0029   PH 7.332*   PCO2 38.7   HCO3 20.5*   POCSATURATED 53*   BE -5       Recent Labs  Lab 04/28/17  0149   COLORU Yellow   SPECGRAV 1.010   PHUR 5.0   PROTEINUA Negative   BACTERIA Rare   NITRITE Negative   LEUKOCYTESUR Negative   UROBILINOGEN Negative       Diagnostic Results:  Labs: Reviewed  X-Ray:  Reviewed  US: Reviewed    ASSESSMENT/PLAN:     Patient is a 68 y.o. male y/o M with HFrEF (EF 20-25% in Jun 2016), NICM declined ICD, HTN, HLD, atrial fibrillation on Eliquis, T2DM, h/o CVA in 2010, ERIC presented with generalized weakness, fatigue and AMS found to have HUMERA on CKD, severe hyperkalemia and acidosis.    HUMERA on CKD stage 3   - baseline sCr 1.5-1.9. sCr 6.2 on arrival.   - UOP improved upon placing andrade catheter  - pre-renal HUMERA 2/2 volume depletion  - improving with hydration. sCr 2.2 today; close to baseline  - no need for RRT  - continue to monitor. Strict I/Os    Hyperkalemia  - persists despite improvement in renal function  - possibly related to dehydration. Hypernatremia and net negative volume status also support this  - polyuria may have been related to post-ATN diuresis  - continue IV hydration. Consider switching to D5W at 75-100cc/hr. F/u renal function in AM.   - no need for RRT  - continue low K diet.     NAGMA  - likely 2/2 volume resuscitation with NS + underlying CKD  - resolved  - okay to remove bicarb from IVF; see above.       Juli Gunn, PGY-4  Nephrology Fellow  Pager: [phone]           [phone]

## 2022-06-22 NOTE — ED PROVIDER NOTE - OBJECTIVE STATEMENT
40 y/o F w/ Hx of RMCA CVA (May 2022), known PFO (closure scheduled for 7/1/2022), on Eliquis, s/p recent admission to [facility] for syncope, dizziness, and heavy vaginal bleeding secondary to fibroids, s/p cervical myomectomy of 3cm fibroid 2 days ago, received 3 units of PRBC and 1 unit of cryo, discharged yesterday, presents today for feeling weak, faint, and SOB w/ exertion. This morning, had near syncopal episode at breakfast. Endorses intermittent CP. Denies current CP in ED. Pt reports some vaginal spotting since myomectomy 2 days ago, unchanged. Last colonoscopy in 2020 and negative for acute findings. EGD last yr negative for peptic ulcer disease or gastritis. On ASA and plavix for duo anti-platelet therapy for stroke. 42 y/o F w/ Hx of RMCA CVA (May 2022), known PFO (closure scheduled for 7/1/2022), s/p recent admission to [facility] for syncope, dizziness, and heavy vaginal bleeding secondary to fibroids, s/p cervical myomectomy of 3cm fibroid 2 days ago, received 3 units of PRBC and 1 unit of cryo, discharged yesterday, presents today for feeling weak, faint, and SOB w/ exertion. This morning, had near syncopal episode at breakfast. Endorses intermittent CP. Denies current CP or sob in ED. Pt reports some vaginal spotting since myomectomy 2 days ago, unchanged. Last colonoscopy in 2020 and negative for acute findings. EGD last yr negative for peptic ulcer disease or gastritis. On ASA and plavix for duo anti-platelet therapy for stroke.

## 2022-06-22 NOTE — ED PROVIDER NOTE - CARE PROVIDER_API CALL
Sherrie Sheppard)  Obstetrics and Gynecology  [facility] [facility], [address]  [place] [address]  Phone: [phone]  Fax: [phone]  Follow Up Time:

## 2022-06-22 NOTE — ED ADULT TRIAGE NOTE - CHIEF COMPLAINT QUOTE
Pt presents to the ED c/o low hemoglobin of 7.1 associated w/ weakness, SOB, and chest discomfort. Pt w/ mild spotting. Pt had emergency surgery 3 days ago for heavy vaginal bleeding/uterine fibroid. Pt received 3 units of PRBCs and 1 unit of cryo. denies fever, chills, NVD, urinary sx.

## 2022-06-22 NOTE — ED PROVIDER NOTE - PHYSICAL EXAMINATION
VITAL SIGNS: I have reviewed nursing notes and confirm.  CONSTITUTIONAL: In no acute distress. Pale.   SKIN:  Pale. warm and dry, no acute rash.   HEAD:  normocephalic, atraumatic.  EYES: EOM intact; conjunctiva and sclera clear.  ENT: No nasal discharge; airway clear.   NECK: Supple; non tender.  CARD: S1, S2 normal; no murmurs, gallops, or rubs. Regular rate and rhythm.   RESP:  Clear to auscultation b/l, no wheezes, rales or rhonchi.  ABD: Normal bowel sounds; soft; non-distended; non-tender; no guarding/ rebound.  EXT: Normal ROM. No clubbing, cyanosis or edema. 2+ pulses to b/l ue/le.  NEURO: Alert, oriented, grossly unremarkable  PSYCH: Cooperative, mood and affect appropriate.

## 2022-06-23 ENCOUNTER — APPOINTMENT (OUTPATIENT)
Dept: HEMATOLOGY ONCOLOGY | Facility: CLINIC | Age: 42
End: 2022-06-23
Payer: COMMERCIAL

## 2022-06-23 PROCEDURE — 99214 OFFICE O/P EST MOD 30 MIN: CPT | Mod: 95

## 2022-06-23 NOTE — ASSESSMENT
[FreeTextEntry1] : I reviewed patient's blood work in our system and her most recent hemoglobin from yesterday was 9 g/dL...\par \par Orders for IV iron placed and patient will get IV iron as soon as possible.\par \par \par Patient to see us in follow-up after the PFO closure.\par \par

## 2022-06-23 NOTE — HISTORY OF PRESENT ILLNESS
[Home] : at home, [unfilled] , at the time of the visit. [Other Location: e.g. Home (Enter Location, City,State)___] : at [unfilled] [de-identified] : 41 years old female had a recent stroke... She does not have the traditional risk factors she was found to have anticardiolipin antibodies... She also had an echo at [place] that reveals a PFO. [de-identified] : June 23, 2022 on [place] way to the airport last week she had severe vaginal bleed... She was initially seen at [facility] later transfer to [facility], where Dr. Yael Yepez performed fibroid removal surgery... Her vaginal bleed now with minimal... Patient is going for PFO closure at [place] on July 1...

## 2022-06-23 NOTE — CONSULT LETTER
[Dear  ___] : Dear  [unfilled], [Consult Letter:] : I had the pleasure of evaluating your patient, [unfilled]. [Please see my note below.] : Please see my note below. [Consult Closing:] : Thank you very much for allowing me to participate in the care of this patient.  If you have any questions, please do not hesitate to contact me. [Sincerely,] : Sincerely, [FreeTextEntry3] : Luana Paul MD\par

## 2022-06-24 ENCOUNTER — OUTPATIENT (OUTPATIENT)
Dept: OUTPATIENT SERVICES | Facility: HOSPITAL | Age: 42
LOS: 1 days | End: 2022-06-24
Payer: COMMERCIAL

## 2022-06-24 ENCOUNTER — APPOINTMENT (OUTPATIENT)
Dept: INFUSION THERAPY | Facility: CLINIC | Age: 42
End: 2022-06-24

## 2022-06-24 VITALS
OXYGEN SATURATION: 98 % | SYSTOLIC BLOOD PRESSURE: 100 MMHG | HEIGHT: 62 IN | HEART RATE: 77 BPM | WEIGHT: 110.01 LBS | RESPIRATION RATE: 18 BRPM | DIASTOLIC BLOOD PRESSURE: 64 MMHG | TEMPERATURE: 98 F

## 2022-06-24 DIAGNOSIS — M25.569 PAIN IN UNSPECIFIED KNEE: Chronic | ICD-10-CM

## 2022-06-24 DIAGNOSIS — Z98.890 OTHER SPECIFIED POSTPROCEDURAL STATES: Chronic | ICD-10-CM

## 2022-06-24 DIAGNOSIS — D50.9 IRON DEFICIENCY ANEMIA, UNSPECIFIED: ICD-10-CM

## 2022-06-24 LAB
CULTURE RESULTS: SIGNIFICANT CHANGE UP
CULTURE RESULTS: SIGNIFICANT CHANGE UP
SPECIMEN SOURCE: SIGNIFICANT CHANGE UP
SPECIMEN SOURCE: SIGNIFICANT CHANGE UP
SURGICAL PATHOLOGY STUDY: SIGNIFICANT CHANGE UP

## 2022-06-24 RX ORDER — FERUMOXYTOL 510 MG/17ML
510 INJECTION INTRAVENOUS ONCE
Refills: 0 | Status: COMPLETED | OUTPATIENT
Start: 2022-06-24 | End: 2022-06-24

## 2022-06-24 RX ORDER — FERUMOXYTOL 510 MG/17ML
510 INJECTION INTRAVENOUS ONCE
Refills: 0 | Status: COMPLETED | OUTPATIENT
Start: 2022-06-29 | End: 2022-06-29

## 2022-06-24 RX ADMIN — FERUMOXYTOL 117 MILLIGRAM(S): 510 INJECTION INTRAVENOUS at 12:14

## 2022-06-24 RX ADMIN — FERUMOXYTOL 510 MILLIGRAM(S): 510 INJECTION INTRAVENOUS at 13:20

## 2022-06-25 DIAGNOSIS — D25.0 SUBMUCOUS LEIOMYOMA OF UTERUS: ICD-10-CM

## 2022-06-25 DIAGNOSIS — Z79.01 LONG TERM (CURRENT) USE OF ANTICOAGULANTS: ICD-10-CM

## 2022-06-25 DIAGNOSIS — I95.9 HYPOTENSION, UNSPECIFIED: ICD-10-CM

## 2022-06-25 DIAGNOSIS — Q21.1 ATRIAL SEPTAL DEFECT: ICD-10-CM

## 2022-06-25 DIAGNOSIS — D62 ACUTE POSTHEMORRHAGIC ANEMIA: ICD-10-CM

## 2022-06-25 DIAGNOSIS — R60.0 LOCALIZED EDEMA: ICD-10-CM

## 2022-06-25 DIAGNOSIS — R55 SYNCOPE AND COLLAPSE: ICD-10-CM

## 2022-06-25 DIAGNOSIS — Z86.73 PERSONAL HISTORY OF TRANSIENT ISCHEMIC ATTACK (TIA), AND CEREBRAL INFARCTION WITHOUT RESIDUAL DEFICITS: ICD-10-CM

## 2022-06-25 DIAGNOSIS — D68.61 ANTIPHOSPHOLIPID SYNDROME: ICD-10-CM

## 2022-06-25 DIAGNOSIS — N92.0 EXCESSIVE AND FREQUENT MENSTRUATION WITH REGULAR CYCLE: ICD-10-CM

## 2022-06-28 ENCOUNTER — NON-APPOINTMENT (OUTPATIENT)
Age: 42
End: 2022-06-28

## 2022-06-29 ENCOUNTER — OUTPATIENT (OUTPATIENT)
Dept: OUTPATIENT SERVICES | Facility: HOSPITAL | Age: 42
LOS: 1 days | End: 2022-06-29
Payer: COMMERCIAL

## 2022-06-29 ENCOUNTER — APPOINTMENT (OUTPATIENT)
Dept: INFUSION THERAPY | Facility: CLINIC | Age: 42
End: 2022-06-29

## 2022-06-29 VITALS
HEART RATE: 77 BPM | RESPIRATION RATE: 18 BRPM | TEMPERATURE: 98 F | OXYGEN SATURATION: 99 % | SYSTOLIC BLOOD PRESSURE: 100 MMHG | DIASTOLIC BLOOD PRESSURE: 78 MMHG

## 2022-06-29 DIAGNOSIS — Z98.890 OTHER SPECIFIED POSTPROCEDURAL STATES: Chronic | ICD-10-CM

## 2022-06-29 DIAGNOSIS — M25.569 PAIN IN UNSPECIFIED KNEE: Chronic | ICD-10-CM

## 2022-06-29 DIAGNOSIS — D50.9 IRON DEFICIENCY ANEMIA, UNSPECIFIED: ICD-10-CM

## 2022-06-29 RX ADMIN — FERUMOXYTOL 117 MILLIGRAM(S): 510 INJECTION INTRAVENOUS at 10:34

## 2022-06-29 RX ADMIN — FERUMOXYTOL 510 MILLIGRAM(S): 510 INJECTION INTRAVENOUS at 11:34

## 2022-07-28 ENCOUNTER — TRANSCRIPTION ENCOUNTER (OUTPATIENT)
Age: 42
End: 2022-07-28

## 2022-07-29 ENCOUNTER — LABORATORY RESULT (OUTPATIENT)
Age: 42
End: 2022-07-29

## 2022-08-01 ENCOUNTER — TRANSCRIPTION ENCOUNTER (OUTPATIENT)
Age: 42
End: 2022-08-01

## 2022-08-05 PROBLEM — Z00.00 ENCOUNTER FOR PREVENTIVE HEALTH EXAMINATION: Noted: 2022-08-05

## 2022-08-11 ENCOUNTER — APPOINTMENT (OUTPATIENT)
Dept: HEMATOLOGY ONCOLOGY | Facility: CLINIC | Age: 42
End: 2022-08-11

## 2022-08-11 LAB
25(OH)D3 SERPL-MCNC: 25.8 NG/ML
APTT 2H P 1:4 NP PPP: 41.1 SEC
APTT 2H P INC PPP: 41.4 SEC
APTT 50/50 MIX COMMENT: NORMAL
APTT IMM NP/PRE NP PPP: 39.1 SEC
APTT INV RATIO PPP: 41.4 SEC
B2 GLYCOPROT1 IGA SERPL IA-ACNC: <5 SAU
B2 GLYCOPROT1 IGG SER-ACNC: <5 SGU
B2 GLYCOPROT1 IGM SER-ACNC: 7.4 SMU
CARDIOLIPIN AB SER IA-ACNC: POSITIVE
FOLATE SERPL-MCNC: 9.5 NG/ML
HGB FREE PLAS-MCNC: 5.3 MG/DL
NPP NORMAL POOLED PLASMA: 33.4 SECS

## 2022-08-11 PROCEDURE — 99213 OFFICE O/P EST LOW 20 MIN: CPT | Mod: 95

## 2022-08-11 RX ORDER — ASPIRIN 81 MG/1
81 TABLET, CHEWABLE ORAL DAILY
Qty: 30 | Refills: 0 | Status: DISCONTINUED | COMMUNITY
End: 2022-08-11

## 2022-08-11 RX ORDER — CLOPIDOGREL 75 MG/1
75 TABLET, FILM COATED ORAL DAILY
Qty: 30 | Refills: 3 | Status: DISCONTINUED | COMMUNITY
End: 2022-08-11

## 2022-08-11 NOTE — ASSESSMENT
[FreeTextEntry1] : Discussed with patient the presence of low level of IgM anticardiolipin antibodies declining titers...\par \par Patient currently doing well tolerating Eliquis well...\par \par She will have repeat blood work including iron studies to rule out iron deficiency anemia.\par \par \par Follow-up here in 3 to 4 months or sooner if needed.

## 2022-08-11 NOTE — REASON FOR VISIT
Rx signed    Please remind patient to get labs done, due for a1c. Has she gotten her DM2 eye exam yet? Due for annual exam in 3 months.       Yeison Peak, DO  Family Medicine [Initial Consultation] : an initial consultation for [Blood Count Assessment] : blood count assessment

## 2022-08-11 NOTE — HISTORY OF PRESENT ILLNESS
[Home] : at home, [unfilled] , at the time of the visit. [Medical Office: ([facility])___] : at the medical office located in  [Verbal consent obtained from patient] : the patient, [unfilled] [de-identified] : 41 years old female had a recent stroke... She does not have the traditional risk factors she was found to have anticardiolipin antibodies... She also had an echo at [place] that reveals a PFO. [de-identified] : August 11, 2022 patient requested a follow-up appointment to discuss the presence of lupus anticoagulant... Patient had her PFO closed at [place]... Postprocedure she developed paroxysmal atrial fibrillation and is currently on Eliquis 5 mg every 12 hours

## 2022-09-02 ENCOUNTER — APPOINTMENT (OUTPATIENT)
Dept: NEUROLOGY | Facility: CLINIC | Age: 42
End: 2022-09-02

## 2022-09-02 VITALS
DIASTOLIC BLOOD PRESSURE: 64 MMHG | BODY MASS INDEX: 20.8 KG/M2 | SYSTOLIC BLOOD PRESSURE: 98 MMHG | OXYGEN SATURATION: 100 % | HEART RATE: 61 BPM | TEMPERATURE: 97.6 F | WEIGHT: 113 LBS | HEIGHT: 62 IN

## 2022-09-02 DIAGNOSIS — I63.9 CEREBRAL INFARCTION, UNSPECIFIED: ICD-10-CM

## 2022-09-02 LAB
ALBUMIN SERPL ELPH-MCNC: 4.9 G/DL
ALP BLD-CCNC: 55 U/L
ALT SERPL-CCNC: 24 U/L
ANION GAP SERPL CALC-SCNC: 12 MMOL/L
AST SERPL-CCNC: 22 U/L
BASOPHILS # BLD AUTO: 0.03 K/UL
BASOPHILS NFR BLD AUTO: 0.5 %
BILIRUB SERPL-MCNC: 0.3 MG/DL
BUN SERPL-MCNC: 15 MG/DL
CALCIUM SERPL-MCNC: 9.6 MG/DL
CHLORIDE SERPL-SCNC: 102 MMOL/L
CHOLEST SERPL-MCNC: 126 MG/DL
CO2 SERPL-SCNC: 25 MMOL/L
CREAT SERPL-MCNC: 0.8 MG/DL
EGFR: 94 ML/MIN/1.73M2
EOSINOPHIL # BLD AUTO: 0.24 K/UL
EOSINOPHIL NFR BLD AUTO: 4 %
GLUCOSE SERPL-MCNC: 70 MG/DL
HCT VFR BLD CALC: 40.3 %
HDLC SERPL-MCNC: 64 MG/DL
HGB BLD-MCNC: 12.7 G/DL
IMM GRANULOCYTES NFR BLD AUTO: 0.3 %
LDLC SERPL CALC-MCNC: 52 MG/DL
LYMPHOCYTES # BLD AUTO: 1.8 K/UL
LYMPHOCYTES NFR BLD AUTO: 29.8 %
MAN DIFF?: NORMAL
MCHC RBC-ENTMCNC: 30.8 PG
MCHC RBC-ENTMCNC: 31.5 GM/DL
MCV RBC AUTO: 97.6 FL
MONOCYTES # BLD AUTO: 0.59 K/UL
MONOCYTES NFR BLD AUTO: 9.8 %
NEUTROPHILS # BLD AUTO: 3.36 K/UL
NEUTROPHILS NFR BLD AUTO: 55.6 %
NONHDLC SERPL-MCNC: 62 MG/DL
PLATELET # BLD AUTO: 234 K/UL
POTASSIUM SERPL-SCNC: 4.3 MMOL/L
PROT SERPL-MCNC: 7.6 G/DL
RBC # BLD: 4.13 M/UL
RBC # FLD: 13.4 %
SODIUM SERPL-SCNC: 140 MMOL/L
TRIGL SERPL-MCNC: 51 MG/DL
WBC # FLD AUTO: 6.04 K/UL

## 2022-09-02 PROCEDURE — 99214 OFFICE O/P EST MOD 30 MIN: CPT

## 2022-09-02 RX ORDER — ERYTHROMYCIN 5 MG/G
5 OINTMENT OPHTHALMIC
Qty: 4 | Refills: 0 | Status: DISCONTINUED | COMMUNITY
Start: 2022-01-14 | End: 2022-09-02

## 2022-09-02 RX ORDER — ATORVASTATIN CALCIUM 80 MG/1
80 TABLET, FILM COATED ORAL
Qty: 30 | Refills: 0 | Status: DISCONTINUED | COMMUNITY
Start: 2022-06-08 | End: 2022-09-02

## 2022-09-02 NOTE — ASSESSMENT
[FreeTextEntry1] : PLAN\par - Continue with previous home meds.\par - Continue Eliquis 5 mg BID for 6 months. \par - Will repeat antiphospholipid antibodies and read loop recorder at that time to make decision about  AC vs Plavix. \par - Patient verbalized understanding of this. \par - Follow up in 6 months.

## 2022-09-02 NOTE — PHYSICAL EXAM
[General Appearance - Alert] : alert [General Appearance - In No Acute Distress] : in no acute distress [Oriented To Time, Place, And Person] : oriented to person, place, and time [Impaired Insight] : insight and judgment were intact [Affect] : the affect was normal [Person] : oriented to person [Place] : oriented to place [Time] : oriented to time [Concentration Intact] : normal concentrating ability [Visual Intact] : visual attention was ~T not ~L decreased [Naming Objects] : no difficulty naming common objects [Repeating Phrases] : no difficulty repeating a phrase [Writing A Sentence] : no difficulty writing a sentence [Fluency] : fluency intact [Comprehension] : comprehension intact [Reading] : reading intact [Past History] : adequate knowledge of personal past history [Cranial Nerves Optic (II)] : visual acuity intact bilaterally,  visual fields full to confrontation, pupils equal round and reactive to light [Cranial Nerves Oculomotor (III)] : extraocular motion intact [Cranial Nerves Trigeminal (V)] : facial sensation intact symmetrically [Cranial Nerves Facial (VII)] : face symmetrical [Cranial Nerves Vestibulocochlear (VIII)] : hearing was intact bilaterally [Cranial Nerves Glossopharyngeal (IX)] : tongue and palate midline [Cranial Nerves Accessory (XI - Cranial And Spinal)] : head turning and shoulder shrug symmetric [Cranial Nerves Hypoglossal (XII)] : there was no tongue deviation with protrusion [Motor Tone] : muscle tone was normal in all four extremities [Motor Strength] : muscle strength was normal in all four extremities [No Muscle Atrophy] : normal bulk in all four extremities [Sensation Tactile Decrease] : light touch was intact [Abnormal Walk] : normal gait [Balance] : balance was intact [3+] : Ankle jerk left 3+ [Sclera] : the sclera and conjunctiva were normal [PERRL With Normal Accommodation] : pupils were equal in size, round, reactive to light, with normal accommodation [Extraocular Movements] : extraocular movements were intact [Outer Ear] : the ears and nose were normal in appearance [Oropharynx] : the oropharynx was normal [Neck Appearance] : the appearance of the neck was normal [Neck Cervical Mass (___cm)] : no neck mass was observed [Jugular Venous Distention Increased] : there was no jugular-venous distention [Thyroid Diffuse Enlargement] : the thyroid was not enlarged [Thyroid Nodule] : there were no palpable thyroid nodules [Auscultation Breath Sounds / Voice Sounds] : lungs were clear to auscultation bilaterally [Heart Rate And Rhythm] : heart rate was normal and rhythm regular [Heart Sounds] : normal S1 and S2 [Heart Sounds Gallop] : no gallops [Murmurs] : no murmurs [Heart Sounds Pericardial Friction Rub] : no pericardial rub [Full Pulse] : the pedal pulses are present [Edema] : there was no peripheral edema [No CVA Tenderness] : no ~M costovertebral angle tenderness [No Spinal Tenderness] : no spinal tenderness [Skin Color & Pigmentation] : normal skin color and pigmentation [Skin Turgor] : normal skin turgor [] : no rash [Past-pointing] : there was no past-pointing [Tremor] : no tremor present [Plantar Reflex Right Only] : normal on the right [Plantar Reflex Left Only] : normal on the left

## 2022-09-02 NOTE — HISTORY OF PRESENT ILLNESS
[FreeTextEntry1] : Patient to get PFO closed on July 1st. \par On a flight this Sunday for 3.5 hours to  and then will drive 3 hours to get to her vacation destination. On the way back she will do the same. \par \par May 3rd 12 [place]\par left facial droop\par slurred speech. \par \par Got to ed in 20 minutes at [facility]. Almost back to normal. Her symptoms resolved within 2 hours. However patient feels that her left side of her face felt slightly different. 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 H/o migraines - she had a headache that morning. Took Ubrelvy that day. Miscarriages diagnosed as vestibular migraine -by [facility]. No aura besides vertigo. Patient had a couple of first trimester miscarriages and her mother had several miscarriages. Covid diagnosis - on incidental checking. Fever a day later. Patient has been vaccinated and boosted.\par \par Recently, she got recorded on her loop recorder a 3-hour long episode of atrial fibrillation. Pt remember an episodes as uncomfortable. 2 times positive for anticardiolipin antibodies. Patient got PFO closure.

## 2022-10-07 PROBLEM — R79.89 LOW SERUM VITAMIN D: Status: ACTIVE | Noted: 2022-10-07

## 2022-10-07 NOTE — ASSESSMENT
[FreeTextEntry1] : Repeat blood work to be done, and patient was found to have iron deficiency anemia... However since her hemoglobin is 10.4 g/dL she should continue with oral iron replacement...\par \par Case discussed in detail with Dr. Oshea who is in favor of closing the PFO.\par \par  All this was communicated to patient via [facility].\par \par Hope you find this information helpful\par

## 2022-10-07 NOTE — HISTORY OF PRESENT ILLNESS
[de-identified] : 41 years old female had a recent stroke... She does not have the traditional risk factors she was found to have anticardiolipin antibodies... She also had an echo at [place] that reveals a PFO.

## 2022-12-20 ENCOUNTER — TRANSCRIPTION ENCOUNTER (OUTPATIENT)
Age: 42
End: 2022-12-20

## 2022-12-22 ENCOUNTER — LABORATORY RESULT (OUTPATIENT)
Age: 42
End: 2022-12-22

## 2022-12-27 ENCOUNTER — APPOINTMENT (OUTPATIENT)
Dept: HEMATOLOGY ONCOLOGY | Facility: CLINIC | Age: 42
End: 2022-12-27

## 2022-12-27 VITALS
WEIGHT: 113 LBS | HEART RATE: 76 BPM | SYSTOLIC BLOOD PRESSURE: 106 MMHG | DIASTOLIC BLOOD PRESSURE: 69 MMHG | TEMPERATURE: 97.9 F | BODY MASS INDEX: 20.8 KG/M2 | OXYGEN SATURATION: 100 % | HEIGHT: 62 IN

## 2022-12-27 DIAGNOSIS — D50.9 IRON DEFICIENCY ANEMIA, UNSPECIFIED: ICD-10-CM

## 2022-12-27 DIAGNOSIS — I48.91 UNSPECIFIED ATRIAL FIBRILLATION: ICD-10-CM

## 2022-12-27 DIAGNOSIS — E55.9 VITAMIN D DEFICIENCY, UNSPECIFIED: ICD-10-CM

## 2022-12-27 LAB
25(OH)D3 SERPL-MCNC: 26.6 NG/ML
ALBUMIN SERPL ELPH-MCNC: 4.8 G/DL
ALP BLD-CCNC: 56 U/L
ALT SERPL-CCNC: 16 U/L
ANION GAP SERPL CALC-SCNC: 13 MMOL/L
APTT 2H P 1:4 NP PPP: 38.3 SEC
APTT 2H P INC PPP: 38.9 SEC
APTT 50/50 MIX COMMENT: NORMAL
APTT IMM NP/PRE NP PPP: 37.5 SEC
APTT INV RATIO PPP: 39.8 SEC
AST SERPL-CCNC: 21 U/L
B2 GLYCOPROT1 IGA SERPL IA-ACNC: <5 SAU
B2 GLYCOPROT1 IGG SER-ACNC: <5 SGU
B2 GLYCOPROT1 IGM SER-ACNC: 8.1 SMU
BASOPHILS # BLD AUTO: 0.04 K/UL
BASOPHILS NFR BLD AUTO: 0.8 %
BILIRUB SERPL-MCNC: 0.4 MG/DL
BUN SERPL-MCNC: 17 MG/DL
CALCIUM SERPL-MCNC: 9.5 MG/DL
CHLORIDE SERPL-SCNC: 103 MMOL/L
CO2 SERPL-SCNC: 24 MMOL/L
CONFIRM: 47.2 SEC
CREAT SERPL-MCNC: 0.84 MG/DL
DRVVT IMM 1:2 NP PPP: NORMAL
DRVVT SCREEN TO CONFIRM RATIO: 0.52 RATIO
EGFR: 88 ML/MIN/1.73M2
EOSINOPHIL # BLD AUTO: 0.24 K/UL
EOSINOPHIL NFR BLD AUTO: 4.7 %
ERYTHROCYTE [SEDIMENTATION RATE] IN BLOOD BY WESTERGREN METHOD: 7 MM/HR
GLUCOSE SERPL-MCNC: 79 MG/DL
HCT VFR BLD CALC: 35.7 %
HGB BLD-MCNC: 10.9 G/DL
IMM GRANULOCYTES NFR BLD AUTO: 0.2 %
LDH SERPL-CCNC: 126 U/L
LYMPHOCYTES # BLD AUTO: 1.6 K/UL
LYMPHOCYTES NFR BLD AUTO: 31.1 %
MAN DIFF?: NORMAL
MCHC RBC-ENTMCNC: 27.9 PG
MCHC RBC-ENTMCNC: 30.5 GM/DL
MCV RBC AUTO: 91.3 FL
MONOCYTES # BLD AUTO: 0.48 K/UL
MONOCYTES NFR BLD AUTO: 9.3 %
NEUTROPHILS # BLD AUTO: 2.77 K/UL
NEUTROPHILS NFR BLD AUTO: 53.9 %
NPP NORMAL POOLED PLASMA: 32.6 SECS
PLATELET # BLD AUTO: 284 K/UL
POTASSIUM SERPL-SCNC: 4.2 MMOL/L
PROT SERPL-MCNC: 7.3 G/DL
RBC # BLD: 3.91 M/UL
RBC # FLD: 12.7 %
SCREEN DRVVT: 28.8 SEC
SILICA CLOTTING TIME INTERPRETATION: NORMAL
SILICA CLOTTING TIME S/C: 0.77 RATIO
SODIUM SERPL-SCNC: 139 MMOL/L
WBC # FLD AUTO: 5.14 K/UL

## 2022-12-27 PROCEDURE — 99214 OFFICE O/P EST MOD 30 MIN: CPT

## 2022-12-27 NOTE — HISTORY OF PRESENT ILLNESS
[de-identified] : 41 years old female had a recent stroke... She does not have the traditional risk factors she was found to have anticardiolipin antibodies... She also had an echo at [place] that reveals a PFO. [de-identified] : August 11, 2022 patient requested a follow-up appointment to discuss the presence of lupus anticoagulant... Patient had her PFO closed at [place]... Postprocedure she developed paroxysmal atrial fibrillation and is currently on Eliquis 5 mg every 12 hours\par \par December 27, 2022 patient returns for follow-up... He had a repeat blood work in the lupus anticoagulants are no longer present... She admits to very heavy menstruation specially since she is on Eliquis... Her GYN is considering uterine ablation to decrease her bleeding...

## 2022-12-27 NOTE — ASSESSMENT
[FreeTextEntry1] : \par \par Patient currently doing well tolerating Eliquis well...\par \par We added iron studies to the existing labs, to identify the cause of her anemia... Patient was properly counseled about the amount of iron replacement required...\par \par \par Follow-up here in 3 to 4 months or sooner if needed.

## 2022-12-30 ENCOUNTER — TRANSCRIPTION ENCOUNTER (OUTPATIENT)
Age: 42
End: 2022-12-30

## 2023-01-18 ENCOUNTER — TRANSCRIPTION ENCOUNTER (OUTPATIENT)
Age: 43
End: 2023-01-18

## 2023-01-18 LAB
CARDIOLIPIN AB SER IA-ACNC: POSITIVE
FERRITIN SERPL-MCNC: 8 NG/ML
IRON SATN MFR SERPL: 13 %
IRON SERPL-MCNC: 50 UG/DL
TIBC SERPL-MCNC: 390 UG/DL
UIBC SERPL-MCNC: 340 UG/DL

## 2023-02-02 ENCOUNTER — OUTPATIENT (OUTPATIENT)
Dept: OUTPATIENT SERVICES | Facility: HOSPITAL | Age: 43
LOS: 1 days | End: 2023-02-02
Payer: COMMERCIAL

## 2023-02-02 ENCOUNTER — APPOINTMENT (OUTPATIENT)
Dept: INFUSION THERAPY | Facility: CLINIC | Age: 43
End: 2023-02-02

## 2023-02-02 VITALS
SYSTOLIC BLOOD PRESSURE: 99 MMHG | RESPIRATION RATE: 18 BRPM | TEMPERATURE: 98 F | HEART RATE: 88 BPM | OXYGEN SATURATION: 100 % | DIASTOLIC BLOOD PRESSURE: 64 MMHG

## 2023-02-02 VITALS
RESPIRATION RATE: 20 BRPM | TEMPERATURE: 98 F | DIASTOLIC BLOOD PRESSURE: 65 MMHG | HEART RATE: 65 BPM | SYSTOLIC BLOOD PRESSURE: 100 MMHG | OXYGEN SATURATION: 100 %

## 2023-02-02 DIAGNOSIS — M25.569 PAIN IN UNSPECIFIED KNEE: Chronic | ICD-10-CM

## 2023-02-02 DIAGNOSIS — D50.9 IRON DEFICIENCY ANEMIA, UNSPECIFIED: ICD-10-CM

## 2023-02-02 DIAGNOSIS — Z98.890 OTHER SPECIFIED POSTPROCEDURAL STATES: Chronic | ICD-10-CM

## 2023-02-02 PROCEDURE — 96365 THER/PROPH/DIAG IV INF INIT: CPT

## 2023-02-02 RX ORDER — FERUMOXYTOL 510 MG/17ML
510 INJECTION INTRAVENOUS ONCE
Refills: 0 | Status: COMPLETED | OUTPATIENT
Start: 2023-02-02 | End: 2023-02-02

## 2023-02-02 RX ADMIN — FERUMOXYTOL 117 MILLIGRAM(S): 510 INJECTION INTRAVENOUS at 09:40

## 2023-02-02 RX ADMIN — FERUMOXYTOL 510 MILLIGRAM(S): 510 INJECTION INTRAVENOUS at 10:55

## 2023-02-07 ENCOUNTER — APPOINTMENT (OUTPATIENT)
Dept: INFUSION THERAPY | Facility: CLINIC | Age: 43
End: 2023-02-07

## 2023-02-07 ENCOUNTER — OUTPATIENT (OUTPATIENT)
Dept: OUTPATIENT SERVICES | Facility: HOSPITAL | Age: 43
LOS: 1 days | End: 2023-02-07
Payer: COMMERCIAL

## 2023-02-07 VITALS
OXYGEN SATURATION: 100 % | DIASTOLIC BLOOD PRESSURE: 64 MMHG | TEMPERATURE: 98 F | HEART RATE: 61 BPM | RESPIRATION RATE: 17 BRPM | SYSTOLIC BLOOD PRESSURE: 98 MMHG

## 2023-02-07 DIAGNOSIS — Z98.890 OTHER SPECIFIED POSTPROCEDURAL STATES: Chronic | ICD-10-CM

## 2023-02-07 DIAGNOSIS — M25.569 PAIN IN UNSPECIFIED KNEE: Chronic | ICD-10-CM

## 2023-02-07 DIAGNOSIS — D50.9 IRON DEFICIENCY ANEMIA, UNSPECIFIED: ICD-10-CM

## 2023-02-07 PROCEDURE — 96365 THER/PROPH/DIAG IV INF INIT: CPT

## 2023-02-07 RX ORDER — FERUMOXYTOL 510 MG/17ML
510 INJECTION INTRAVENOUS ONCE
Refills: 0 | Status: COMPLETED | OUTPATIENT
Start: 2023-02-07 | End: 2023-02-07

## 2023-02-07 RX ADMIN — FERUMOXYTOL 510 MILLIGRAM(S): 510 INJECTION INTRAVENOUS at 13:34

## 2023-02-07 RX ADMIN — FERUMOXYTOL 117 MILLIGRAM(S): 510 INJECTION INTRAVENOUS at 12:34

## 2023-03-02 ENCOUNTER — NON-APPOINTMENT (OUTPATIENT)
Age: 43
End: 2023-03-02

## 2023-03-02 ENCOUNTER — APPOINTMENT (OUTPATIENT)
Dept: NEUROLOGY | Facility: CLINIC | Age: 43
End: 2023-03-02
Payer: COMMERCIAL

## 2023-03-02 VITALS
HEART RATE: 74 BPM | OXYGEN SATURATION: 100 % | DIASTOLIC BLOOD PRESSURE: 63 MMHG | WEIGHT: 115 LBS | SYSTOLIC BLOOD PRESSURE: 103 MMHG | HEIGHT: 62 IN | BODY MASS INDEX: 21.16 KG/M2

## 2023-03-02 PROCEDURE — 99213 OFFICE O/P EST LOW 20 MIN: CPT

## 2023-03-02 RX ORDER — UBROGEPANT 50 MG/1
50 TABLET ORAL
Qty: 10 | Refills: 0 | Status: DISCONTINUED | COMMUNITY
Start: 2022-05-17 | End: 2023-03-02

## 2023-03-14 LAB
APO LP(A) SERPL-MCNC: 16.6 NMOL/L
APTT 2H P 1:4 NP PPP: 33.2 SEC
APTT 2H P INC PPP: 33 SEC
APTT HEX PL PPP: NEGATIVE
APTT IMM NP/PRE NP PPP: 32.8 SEC
APTT INV RATIO PPP: 36.9 SEC
AT III PPP CHRO-ACNC: 117 %
B2 GLYCOPROT1 AB SER QL: NEGATIVE
B2 GLYCOPROT1 IGA SERPL IA-ACNC: <5 SAU
B2 GLYCOPROT1 IGG SER-ACNC: <5 SGU
B2 GLYCOPROT1 IGM SER-ACNC: 11.1 SMU
CARDIOLIPIN AB SER IA-ACNC: POSITIVE
CARDIOLIPIN IGM SER-MCNC: 45.8 MPL
CARDIOLIPIN IGM SER-MCNC: 8.8 GPL
CONFIRM: 32.5 SEC
CRP SERPL HS-MCNC: 0.49 MG/L
DEPRECATED CARDIOLIPIN IGA SER: <5 APL
DNA PLOIDY SPEC FC-IMP: NORMAL
DRVVT IMM 1:2 NP PPP: NORMAL
DRVVT SCREEN TO CONFIRM RATIO: 0.9 RATIO
FIBRINOGEN PPP COAG.DERIVED-MCNC: 446 MG/DL
FOLATE SERPL-MCNC: 4.6 NG/ML
HCYS SERPL-MCNC: 11.4 UMOL/L
HEX-1: 49.4 SECS
HEX-2: 46.3 SECS
HOMOCYSTEINE LEVEL: 11.7 UMOL/L
INR PPP: 1.06 RATIO
METHYLMALONIC ACID LEVEL: 93 NMOL/L
NPP NORMAL POOLED PLASMA: 32.6 SECS
PA ADP PRP-ACNC: 41 PRU
PLATELET RESPONSE ASPIRIN: 376 ARU
PT BLD: 12.3 SEC
PTR INTERP: NORMAL
SCREEN DRVVT: 35 SEC
VIT B12 SERPL-MCNC: 1166 PG/ML

## 2023-05-12 NOTE — PHYSICAL EXAM
Per fax received from Dez Nixon - Escitalopram Oxalate 10mg is not covered by patient's Insurance Company  Dr. Mcgrath - Please choose:  1.  Change medication that is not covered to a different medication and send new prescription to patient's pharmacy?  2.  Patient will need to pay for the non-covered medication out-of-pocket?   3.  Try for Prior Authorization with Insurance Company to get medication covered?        P.A. Phone #: [phone] Dez Nixon       PGailA.  ID#:  [id number]     [General Appearance - Alert] : alert [General Appearance - In No Acute Distress] : in no acute distress [Oriented To Time, Place, And Person] : oriented to person, place, and time [Impaired Insight] : insight and judgment were intact [Affect] : the affect was normal [Person] : oriented to person [Place] : oriented to place [Time] : oriented to time [Concentration Intact] : normal concentrating ability [Visual Intact] : visual attention was ~T not ~L decreased [Naming Objects] : no difficulty naming common objects [Repeating Phrases] : no difficulty repeating a phrase [Writing A Sentence] : no difficulty writing a sentence [Fluency] : fluency intact [Comprehension] : comprehension intact [Reading] : reading intact [Past History] : adequate knowledge of personal past history [Cranial Nerves Optic (II)] : visual acuity intact bilaterally,  visual fields full to confrontation, pupils equal round and reactive to light [Cranial Nerves Oculomotor (III)] : extraocular motion intact [Cranial Nerves Trigeminal (V)] : facial sensation intact symmetrically [Cranial Nerves Facial (VII)] : face symmetrical [Cranial Nerves Vestibulocochlear (VIII)] : hearing was intact bilaterally [Cranial Nerves Glossopharyngeal (IX)] : tongue and palate midline [Cranial Nerves Accessory (XI - Cranial And Spinal)] : head turning and shoulder shrug symmetric [Cranial Nerves Hypoglossal (XII)] : there was no tongue deviation with protrusion [Motor Tone] : muscle tone was normal in all four extremities [Motor Strength] : muscle strength was normal in all four extremities [No Muscle Atrophy] : normal bulk in all four extremities [Sensation Tactile Decrease] : light touch was intact [Abnormal Walk] : normal gait [Balance] : balance was intact [3+] : Ankle jerk left 3+ [Sclera] : the sclera and conjunctiva were normal [PERRL With Normal Accommodation] : pupils were equal in size, round, reactive to light, with normal accommodation [Extraocular Movements] : extraocular movements were intact [Outer Ear] : the ears and nose were normal in appearance [Oropharynx] : the oropharynx was normal [Neck Appearance] : the appearance of the neck was normal [Neck Cervical Mass (___cm)] : no neck mass was observed [Jugular Venous Distention Increased] : there was no jugular-venous distention [Thyroid Diffuse Enlargement] : the thyroid was not enlarged [Thyroid Nodule] : there were no palpable thyroid nodules [Auscultation Breath Sounds / Voice Sounds] : lungs were clear to auscultation bilaterally [Heart Rate And Rhythm] : heart rate was normal and rhythm regular [Heart Sounds] : normal S1 and S2 [Heart Sounds Gallop] : no gallops [Murmurs] : no murmurs [Heart Sounds Pericardial Friction Rub] : no pericardial rub [Full Pulse] : the pedal pulses are present [Edema] : there was no peripheral edema [No CVA Tenderness] : no ~M costovertebral angle tenderness [No Spinal Tenderness] : no spinal tenderness [Skin Color & Pigmentation] : normal skin color and pigmentation [Skin Turgor] : normal skin turgor [] : no rash [Past-pointing] : there was no past-pointing [Tremor] : no tremor present [Plantar Reflex Right Only] : normal on the right [Plantar Reflex Left Only] : normal on the left

## 2023-05-12 NOTE — HISTORY OF PRESENT ILLNESS
[FreeTextEntry1] : Interval course: \par patient doing well. Continues to have elevated IgM 104 titers. loop negative so far. \par \par \par \par \par \par \par HPI\par \par \par \par \par Patient to get PFO closed on July 1st. \par On a flight this Sunday for 3.5 hours to  and then will drive 3 hours to get to her vacation destination. On the way back she will do the same. \par \par May 3rd 12 [place]\par left facial droop\par slurred speech. \par \par Got to ed in 20 minutes at [facility]. Almost back to normal. Her symptoms resolved within 2 hours. However patient feels that her left side of her face felt slightly different. 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 H/o migraines - she had a headache that morning. Took Ubrelvy that day. Miscarriages diagnosed as vestibular migraine -by [place]. No aura besides vertigo. Patient had a couple of first trimester miscarriages and her mother had several miscarriages. Covid diagnosis - on incidental checking. Fever a day later. Patient has been vaccinated and boosted.\par \par Recently, she got recorded on her loop recorder a 3-hour long episode of atrial fibrillation. Pt remember an episodes as uncomfortable. 2 times positive for anticardiolipin antibodies. Patient got PFO closure.

## 2023-05-12 NOTE — END OF VISIT
Allyson says patient assistance forms were received but no script was attached. Please fax to [phone]. [Time Spent: ___ minutes] : I have spent [unfilled] minutes of time on the encounter.

## 2023-06-21 ENCOUNTER — LABORATORY RESULT (OUTPATIENT)
Age: 43
End: 2023-06-21

## 2023-06-21 ENCOUNTER — APPOINTMENT (OUTPATIENT)
Dept: NEUROLOGY | Facility: CLINIC | Age: 43
End: 2023-06-21
Payer: COMMERCIAL

## 2023-06-21 VITALS
SYSTOLIC BLOOD PRESSURE: 98 MMHG | WEIGHT: 115 LBS | HEART RATE: 67 BPM | HEIGHT: 62 IN | TEMPERATURE: 98.3 F | BODY MASS INDEX: 21.16 KG/M2 | DIASTOLIC BLOOD PRESSURE: 66 MMHG | OXYGEN SATURATION: 10 %

## 2023-06-21 DIAGNOSIS — Q21.1 ATRIAL SEPTAL DEFECT: ICD-10-CM

## 2023-06-21 DIAGNOSIS — I73.00 RAYNAUD'S SYNDROME W/OUT GANGRENE: ICD-10-CM

## 2023-06-21 PROCEDURE — 99214 OFFICE O/P EST MOD 30 MIN: CPT

## 2023-06-21 RX ORDER — FAMOTIDINE 20 MG/1
20 TABLET, FILM COATED ORAL TWICE DAILY
Refills: 0 | Status: DISCONTINUED | COMMUNITY
End: 2023-06-21

## 2023-06-21 NOTE — PHYSICAL EXAM
[FreeTextEntry1] : The patient is alert and oriented x3, naming intact x3, repetition normal, follows three-step commands, and is able to participate fully in the history taking.\par Speech is normal with no evidence of dysarthria.\par Memory is intact: Immediate recall 3 out of 3, short-term 3 out of 3, remote memory intact\par Cranial nerves II through XII intact\par Motor exam: Upper and lower extremities 5 out of 5 power, normal tone. No abnormal movements noted.\par Sensory exam: Intact to light touch and pinprick. Romberg negative.\par Coordination and vestibular exam: Finger to nose intact, no evidence of truncal or appendicular ataxia. No evidence of nystagmus. no vestibular symptoms elicited with head turning during ambulation.\par Gait: Normal stance and gait.\par Reflexes: One to 2+ in upper and lower extremities. No pathological reflexes. Downgoing toes.\par \par  [General Appearance - Alert] : alert [General Appearance - In No Acute Distress] : in no acute distress [Oriented To Time, Place, And Person] : oriented to person, place, and time [Impaired Insight] : insight and judgment were intact [Affect] : the affect was normal [Person] : oriented to person [Place] : oriented to place [Time] : oriented to time [Concentration Intact] : normal concentrating ability [Visual Intact] : visual attention was ~T not ~L decreased [Naming Objects] : no difficulty naming common objects [Repeating Phrases] : no difficulty repeating a phrase [Writing A Sentence] : no difficulty writing a sentence [Fluency] : fluency intact [Comprehension] : comprehension intact [Reading] : reading intact [Past History] : adequate knowledge of personal past history [Cranial Nerves Optic (II)] : visual acuity intact bilaterally,  visual fields full to confrontation, pupils equal round and reactive to light [Cranial Nerves Oculomotor (III)] : extraocular motion intact [Cranial Nerves Trigeminal (V)] : facial sensation intact symmetrically [Cranial Nerves Facial (VII)] : face symmetrical [Cranial Nerves Vestibulocochlear (VIII)] : hearing was intact bilaterally [Cranial Nerves Glossopharyngeal (IX)] : tongue and palate midline [Cranial Nerves Accessory (XI - Cranial And Spinal)] : head turning and shoulder shrug symmetric [Cranial Nerves Hypoglossal (XII)] : there was no tongue deviation with protrusion [Motor Tone] : muscle tone was normal in all four extremities [Motor Strength] : muscle strength was normal in all four extremities [No Muscle Atrophy] : normal bulk in all four extremities [Sensation Tactile Decrease] : light touch was intact [Abnormal Walk] : normal gait [Balance] : balance was intact [Past-pointing] : there was no past-pointing [Tremor] : no tremor present [3+] : Ankle jerk left 3+ [Plantar Reflex Right Only] : normal on the right [Plantar Reflex Left Only] : normal on the left [Sclera] : the sclera and conjunctiva were normal [PERRL With Normal Accommodation] : pupils were equal in size, round, reactive to light, with normal accommodation [Extraocular Movements] : extraocular movements were intact [Outer Ear] : the ears and nose were normal in appearance [Oropharynx] : the oropharynx was normal [Neck Appearance] : the appearance of the neck was normal [Neck Cervical Mass (___cm)] : no neck mass was observed [Jugular Venous Distention Increased] : there was no jugular-venous distention [Thyroid Diffuse Enlargement] : the thyroid was not enlarged [Thyroid Nodule] : there were no palpable thyroid nodules [Heart Rate And Rhythm] : heart rate was normal and rhythm regular [Auscultation Breath Sounds / Voice Sounds] : lungs were clear to auscultation bilaterally [Heart Sounds] : normal S1 and S2 [Heart Sounds Gallop] : no gallops [Murmurs] : no murmurs [Heart Sounds Pericardial Friction Rub] : no pericardial rub [Full Pulse] : the pedal pulses are present [Edema] : there was no peripheral edema [No CVA Tenderness] : no ~M costovertebral angle tenderness [No Spinal Tenderness] : no spinal tenderness [Skin Color & Pigmentation] : normal skin color and pigmentation [Skin Turgor] : normal skin turgor [] : no rash

## 2023-06-21 NOTE — ASSESSMENT
[FreeTextEntry1] : PLAN\par - Continue with previous home meds.\par - Continue Eliquis 5 mg BID for 6 months. \par - Will repeat antiphospholipid antibodies and read loop recorder at that time to make decision about  AC vs Plavix. \par Hold starting antiplt meds for now so as to allow gastric mucosa to heal. \par - Patient verbalized understanding of this. \par - Follow up in 6 months.

## 2023-06-21 NOTE — REVIEW OF SYSTEMS
[As Noted in HPI] : as noted in HPI [Negative] : Heme/Lymph [de-identified] : no vertigo or headaches

## 2023-06-21 NOTE — HISTORY OF PRESENT ILLNESS
[FreeTextEntry1] : Patient had two episodes of Raynauds like phenomena.\par Also has migraines. \par Saw APS at [facility] who agreed with APS but suggested asa and plavix once the loop was negative. 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 \par \par \par \par \par \par HPI: \par Patient to get PFO closed on July 1st. \par On a flight this Sunday for 3.5 hours to  and then will drive 3 hours to get to her vacation destination. On the way back she will do the same. \par \par May 3rd 12 [place]\par left facial droop\par slurred speech. \par \par Got to ed in 20 minutes at [facility]. Almost back to normal. Her symptoms resolved within 2 hours. However patient feels that her left side of her face felt slightly different. 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 H/o migraines - she had a headache that morning. Took Ubrelvy that day. Miscarriages diagnosed as vestibular migraine -by [place]. No aura besides vertigo. Patient had a couple of first trimester miscarriages and her mother had several miscarriages. Covid diagnosis - on incidental checking. Fever a day later. Patient has been vaccinated and boosted.\par \par Recently, she got recorded on her loop recorder a 3-hour long episode of atrial fibrillation. Pt remember an episodes as uncomfortable. 2 times positive for anticardiolipin antibodies. Patient got PFO closure.

## 2023-08-09 LAB
C3 SERPL-MCNC: 66 MG/DL
C4 SERPL-MCNC: 9 MG/DL

## 2023-09-20 ENCOUNTER — INPATIENT (INPATIENT)
Facility: HOSPITAL | Age: 43
LOS: 0 days | Discharge: HOME | DRG: 69 | End: 2023-09-21
Attending: PSYCHIATRY & NEUROLOGY | Admitting: PSYCHIATRY & NEUROLOGY
Payer: COMMERCIAL

## 2023-09-20 ENCOUNTER — NON-APPOINTMENT (OUTPATIENT)
Age: 43
End: 2023-09-20

## 2023-09-20 VITALS
OXYGEN SATURATION: 100 % | DIASTOLIC BLOOD PRESSURE: 67 MMHG | SYSTOLIC BLOOD PRESSURE: 112 MMHG | WEIGHT: 114.64 LBS | RESPIRATION RATE: 18 BRPM | TEMPERATURE: 98 F | HEART RATE: 82 BPM

## 2023-09-20 DIAGNOSIS — Z98.890 OTHER SPECIFIED POSTPROCEDURAL STATES: Chronic | ICD-10-CM

## 2023-09-20 DIAGNOSIS — M25.569 PAIN IN UNSPECIFIED KNEE: Chronic | ICD-10-CM

## 2023-09-20 DIAGNOSIS — Z87.74 PERSONAL HISTORY OF (CORRECTED) CONGENITAL MALFORMATIONS OF HEART AND CIRCULATORY SYSTEM: Chronic | ICD-10-CM

## 2023-09-20 LAB
ALBUMIN SERPL ELPH-MCNC: 4.8 G/DL — SIGNIFICANT CHANGE UP (ref 3.3–5)
ALP SERPL-CCNC: 56 U/L — SIGNIFICANT CHANGE UP (ref 40–120)
ALT FLD-CCNC: 19 U/L — SIGNIFICANT CHANGE UP (ref 10–45)
ANION GAP SERPL CALC-SCNC: 11 MMOL/L — SIGNIFICANT CHANGE UP (ref 5–17)
APPEARANCE UR: CLEAR — SIGNIFICANT CHANGE UP
APTT BLD: 39.9 SEC — HIGH (ref 24.5–35.6)
AST SERPL-CCNC: 25 U/L — SIGNIFICANT CHANGE UP (ref 10–40)
BASOPHILS # BLD AUTO: 0.04 K/UL — SIGNIFICANT CHANGE UP (ref 0–0.2)
BASOPHILS NFR BLD AUTO: 0.6 % — SIGNIFICANT CHANGE UP (ref 0–2)
BILIRUB SERPL-MCNC: 0.4 MG/DL — SIGNIFICANT CHANGE UP (ref 0.2–1.2)
BILIRUB UR-MCNC: NEGATIVE — SIGNIFICANT CHANGE UP
BUN SERPL-MCNC: 16 MG/DL — SIGNIFICANT CHANGE UP (ref 7–23)
CALCIUM SERPL-MCNC: 9.8 MG/DL — SIGNIFICANT CHANGE UP (ref 8.4–10.5)
CHLORIDE SERPL-SCNC: 103 MMOL/L — SIGNIFICANT CHANGE UP (ref 96–108)
CHOLEST SERPL-MCNC: 128 MG/DL — SIGNIFICANT CHANGE UP
CO2 SERPL-SCNC: 24 MMOL/L — SIGNIFICANT CHANGE UP (ref 22–31)
COLOR SPEC: YELLOW — SIGNIFICANT CHANGE UP
CREAT SERPL-MCNC: 0.96 MG/DL — SIGNIFICANT CHANGE UP (ref 0.5–1.3)
DIFF PNL FLD: ABNORMAL
EGFR: 75 ML/MIN/1.73M2 — SIGNIFICANT CHANGE UP
EOSINOPHIL # BLD AUTO: 0.27 K/UL — SIGNIFICANT CHANGE UP (ref 0–0.5)
EOSINOPHIL NFR BLD AUTO: 4.4 % — SIGNIFICANT CHANGE UP (ref 0–6)
GLUCOSE SERPL-MCNC: 94 MG/DL — SIGNIFICANT CHANGE UP (ref 70–99)
GLUCOSE UR QL: NEGATIVE — SIGNIFICANT CHANGE UP
HCG SERPL-ACNC: <0 MIU/ML — SIGNIFICANT CHANGE UP
HCT VFR BLD CALC: 30.8 % — LOW (ref 34.5–45)
HDLC SERPL-MCNC: 59 MG/DL — SIGNIFICANT CHANGE UP
HGB BLD-MCNC: 9.7 G/DL — LOW (ref 11.5–15.5)
IMM GRANULOCYTES NFR BLD AUTO: 0.2 % — SIGNIFICANT CHANGE UP (ref 0–0.9)
INR BLD: 1.52 — HIGH (ref 0.85–1.18)
KETONES UR-MCNC: NEGATIVE — SIGNIFICANT CHANGE UP
LEUKOCYTE ESTERASE UR-ACNC: ABNORMAL
LIPID PNL WITH DIRECT LDL SERPL: 55 MG/DL — SIGNIFICANT CHANGE UP
LYMPHOCYTES # BLD AUTO: 1.92 K/UL — SIGNIFICANT CHANGE UP (ref 1–3.3)
LYMPHOCYTES # BLD AUTO: 31 % — SIGNIFICANT CHANGE UP (ref 13–44)
MCHC RBC-ENTMCNC: 24.2 PG — LOW (ref 27–34)
MCHC RBC-ENTMCNC: 31.5 GM/DL — LOW (ref 32–36)
MCV RBC AUTO: 76.8 FL — LOW (ref 80–100)
MONOCYTES # BLD AUTO: 0.55 K/UL — SIGNIFICANT CHANGE UP (ref 0–0.9)
MONOCYTES NFR BLD AUTO: 8.9 % — SIGNIFICANT CHANGE UP (ref 2–14)
NEUTROPHILS # BLD AUTO: 3.4 K/UL — SIGNIFICANT CHANGE UP (ref 1.8–7.4)
NEUTROPHILS NFR BLD AUTO: 54.9 % — SIGNIFICANT CHANGE UP (ref 43–77)
NITRITE UR-MCNC: NEGATIVE — SIGNIFICANT CHANGE UP
NON HDL CHOLESTEROL: 69 MG/DL — SIGNIFICANT CHANGE UP
NRBC # BLD: 0 /100 WBCS — SIGNIFICANT CHANGE UP (ref 0–0)
PH UR: 7 — SIGNIFICANT CHANGE UP (ref 5–8)
PLATELET # BLD AUTO: 313 K/UL — SIGNIFICANT CHANGE UP (ref 150–400)
POTASSIUM SERPL-MCNC: 4.1 MMOL/L — SIGNIFICANT CHANGE UP (ref 3.5–5.3)
POTASSIUM SERPL-SCNC: 4.1 MMOL/L — SIGNIFICANT CHANGE UP (ref 3.5–5.3)
PROT SERPL-MCNC: 8 G/DL — SIGNIFICANT CHANGE UP (ref 6–8.3)
PROT UR-MCNC: NEGATIVE MG/DL — SIGNIFICANT CHANGE UP
PROTHROM AB SERPL-ACNC: 17.1 SEC — HIGH (ref 9.5–13)
RBC # BLD: 4.01 M/UL — SIGNIFICANT CHANGE UP (ref 3.8–5.2)
RBC # FLD: 13.5 % — SIGNIFICANT CHANGE UP (ref 10.3–14.5)
SODIUM SERPL-SCNC: 138 MMOL/L — SIGNIFICANT CHANGE UP (ref 135–145)
SP GR SPEC: <=1.005 — SIGNIFICANT CHANGE UP (ref 1–1.03)
TRIGL SERPL-MCNC: 69 MG/DL — SIGNIFICANT CHANGE UP
TROPONIN T, HIGH SENSITIVITY RESULT: <6 NG/L — SIGNIFICANT CHANGE UP (ref 0–51)
UROBILINOGEN FLD QL: 0.2 E.U./DL — SIGNIFICANT CHANGE UP
WBC # BLD: 6.19 K/UL — SIGNIFICANT CHANGE UP (ref 3.8–10.5)
WBC # FLD AUTO: 6.19 K/UL — SIGNIFICANT CHANGE UP (ref 3.8–10.5)

## 2023-09-20 PROCEDURE — 99223 1ST HOSP IP/OBS HIGH 75: CPT

## 2023-09-20 PROCEDURE — 99291 CRITICAL CARE FIRST HOUR: CPT

## 2023-09-20 PROCEDURE — 71045 X-RAY EXAM CHEST 1 VIEW: CPT | Mod: 26

## 2023-09-20 PROCEDURE — 70498 CT ANGIOGRAPHY NECK: CPT | Mod: 26,MA

## 2023-09-20 PROCEDURE — 70450 CT HEAD/BRAIN W/O DYE: CPT | Mod: 26,MA,59

## 2023-09-20 PROCEDURE — 70551 MRI BRAIN STEM W/O DYE: CPT | Mod: 26

## 2023-09-20 PROCEDURE — 70496 CT ANGIOGRAPHY HEAD: CPT | Mod: 26,MA

## 2023-09-20 PROCEDURE — 0042T: CPT | Mod: MA

## 2023-09-20 PROCEDURE — 93306 TTE W/DOPPLER COMPLETE: CPT | Mod: 26

## 2023-09-20 PROCEDURE — 93010 ELECTROCARDIOGRAM REPORT: CPT

## 2023-09-20 RX ORDER — INFLUENZA VIRUS VACCINE 15; 15; 15; 15 UG/.5ML; UG/.5ML; UG/.5ML; UG/.5ML
0.5 SUSPENSION INTRAMUSCULAR ONCE
Refills: 0 | Status: DISCONTINUED | OUTPATIENT
Start: 2023-09-20 | End: 2023-09-21

## 2023-09-20 RX ORDER — ENOXAPARIN SODIUM 100 MG/ML
52 INJECTION SUBCUTANEOUS EVERY 12 HOURS
Refills: 0 | Status: DISCONTINUED | OUTPATIENT
Start: 2023-09-20 | End: 2023-09-21

## 2023-09-20 RX ORDER — ATORVASTATIN CALCIUM 80 MG/1
80 TABLET, FILM COATED ORAL AT BEDTIME
Refills: 0 | Status: DISCONTINUED | OUTPATIENT
Start: 2023-09-20 | End: 2023-09-21

## 2023-09-20 RX ORDER — PANTOPRAZOLE SODIUM 20 MG/1
40 TABLET, DELAYED RELEASE ORAL
Refills: 0 | Status: DISCONTINUED | OUTPATIENT
Start: 2023-09-20 | End: 2023-09-21

## 2023-09-20 NOTE — H&P ADULT - NSHPSOCIALHISTORY_GEN_ALL_CORE
Patient lives with  at home  Smoking status: Denies  Drinking: 3-5 drinks per month; 1 drink per sitting  Drug Use:  Denies

## 2023-09-20 NOTE — ED ADULT NURSE NOTE - NSICDXPASTMEDICALHX_GEN_ALL_CORE_FT
PAST MEDICAL HISTORY:  Cerebrovascular accident (CVA)     Gastritis     PFO (patent foramen ovale)

## 2023-09-20 NOTE — H&P ADULT - ASSESSMENT
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 LKW: 620am. Pt was speaking with  when her speech became slurred for one word and again she tried to repeat it but then was back to normal for the rest of the sentence. Pt is seen outpatient by Dr. Oshea and called vascular neurology outpatient NP Lynda who directed pt to come to the ED. On physical exam, pt with no focal deficits, NIHSS: 0. Pt compliant on Eliquis, never missed a dose. 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 She sought out a rheumatologist at [place] who does research on APS  ( Dr. Hickman [phone]) who she states has given her the okay to continue her Eliquis even formal diagnosis of APS is made. Pt denies any visual, sensory, or motor deficits during this episode or any other episodes since May 2022. She states she asked her  to look at her right after and see if her face from asymmetric. Pt cardiology follow-up (General, EPS and Structural) are all at [place].  HCT: Negative. CTA: negative. Given complete resolution of symptoms that lasted a couple seconds, TNK was not given. Pt admitted to stroke for further workup of TIA.     Neuro  #TIA workup  - Hold Eliquis 5mg BID; Start Lovenox 1mg/kg BID dosing for possible APS  - Anticardiolipin, Lupus antibody and beta-2-glycoprotein drawn in ED before first lovenox dose at 9pm   - Start atorvastatin 80mg daily  - q4hr stroke neuro checks and vitals  - obtain MRI Brain without contrast  - Stroke Code HCT Results: negative   - Stroke Code CTA Results: negative   - Stroke education    Cards  #HTN  - permissive hypertension, Goal -180  - obtain TTE with bubble, to monitor PFO closure   - Stroke Code EKG Results:    #HLD  - high dose statin as above in CVA  - pending LDL results:     Pulm  - call provider if SPO2 < 94%    GI  #Nutrition/Fluids/Electrolytes   - replete K<4 and Mg <2  - Diet: Regular   - IVF: none    #gastritis   - Continue Omeprazole 40mg BID    Renal  - trend BUN/Cr    Infectious Disease  - Stroke Code CXR results:   - Trend WBC, pt afebrile     Endocrine  - pending A1C results:   - penidng TSH results:    DVT Prophylaxis  - lovenox   - SCDs for DVT prophylaxis       IDR Goals: Goals reviewed at interdisciplinary rounds with case management, social work, physical therapy, occupational therapy, and speech language pathology.   Please see specific therapy  notes for in depth goals.    Dispo: Pending PT/OT eval      Discussed daily hospital plans and goals with patient and family at bedside.     Discussed with Neurology Attending Dr. 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 LKW: 620am. Pt was speaking with  when her speech became slurred for one word and again she tried to repeat it but then was back to normal for the rest of the sentence. Pt is seen outpatient by Dr. Oshea and called vascular neurology outpatient NP Lynda who directed pt to come to the ED. On physical exam, pt with no focal deficits, NIHSS: 0. Pt compliant on Eliquis, never missed a dose. 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 She sought out a rheumatologist at [place] who does research on APS  ( Dr. Hickman [phone]) who she states has given her the okay to continue her Eliquis even formal diagnosis of APS is made. Pt denies any visual, sensory, or motor deficits during this episode or any other episodes since May 2022. She states she asked her  to look at her right after and see if her face from asymmetric. Pt cardiology follow-up (General, EPS and Structural) are all at [place].  HCT: Negative. CTA: negative. Given complete resolution of symptoms that lasted a couple seconds, TNK was not given. Pt admitted to stroke for further workup of TIA.     Neuro  #TIA workup  - Hold Eliquis 5mg BID; Start Lovenox 1mg/kg BID dosing for possible APS  - Anticardiolipin, Lupus antibody and beta-2-glycoprotein drawn in ED (F/U results) before first lovenox dose at 9pm   - Start atorvastatin 80mg daily  - q4hr stroke neuro checks and vitals  - obtain MRI Brain without contrast  - Stroke Code HCT Results: negative   - Stroke Code CTA Results: negative   - Stroke education    #Hypercoag labs  - Anticardiolipin, Lupus antibody and beta-2-glycoprotein drawn in ED (F/U results) before first lovenox dose at 9pm   - Heme consulted    Cards  #HTN  - permissive hypertension, Goal -180  - obtain TTE with bubble, to monitor PFO closure   - Stroke Code EKG Results:    #HLD  - high dose statin as above in CVA  - pending LDL results:     Pulm  - call provider if SPO2 < 94%    GI  #Nutrition/Fluids/Electrolytes   - replete K<4 and Mg <2  - Diet: Regular   - IVF: none    #gastritis   - Start Pantoprazole 40mg BID    Renal  - trend BUN/Cr    Infectious Disease  - Stroke Code CXR results:   - Trend WBC, pt afebrile     Endocrine  - pending A1C results:   - penidng TSH results:    DVT Prophylaxis  - On Lovenox full dose   - SCDs for DVT prophylaxis       IDR Goals: Goals reviewed at interdisciplinary rounds with case management, social work, physical therapy, occupational therapy, and speech language pathology.   Please see specific therapy  notes for in depth goals.    Dispo: Pending PT/OT eval      Discussed daily hospital plans and goals with patient and family at bedside.     Discussed with Neurology Attending Dr. Rapp

## 2023-09-20 NOTE — ED PROVIDER NOTE - CLINICAL SUMMARY MEDICAL DECISION MAKING FREE TEXT BOX
43F with a h/o TIA in the past, afib, PFO closure, ?antiphospholipid syndrome who p/w episode of slurred speech @ 645 this AM, resolving a few seconds without intervention. States, "it sounded the same as when I last had a stroke." Pt is on eliquis 5mg BID, took her dose today.   Stroke code called upon arrival to the ER.   Pt seen by stroke team. Sx resolved, no deficits currently. CTs with no acute findings. Stroke team requests admit to neuro for further workup and mgmt of repeat TIA on eliquis.

## 2023-09-20 NOTE — ED ADULT TRIAGE NOTE - CHIEF COMPLAINT QUOTE
Pt reporting slurred speech @ 645 this AM, resolving a few seconds without intervention. States, "it sounded the same as when I last had a stroke." Hx CVA on eliquis 5mg BID, +antiphospholipid syndrome.

## 2023-09-20 NOTE — ED ADULT NURSE NOTE - OBJECTIVE STATEMENT
Pt A&Ox4 presents to ED with episode of slurred speech. Per pt, she had an episode of slurred speech around 0645 this morning that resolved prior to arrival to ED. Pt has hx of CVA in 2022 and antiphospholipid syndrome. Pt on eliquis and took dose this morning. Ambulates with steady gait. PERRL. Full strength and sensation in all four extremities. Denies chest pain, shortness of breath, nausea/vomiting, fevers/chills, urinary symptoms. Stroke code activated and MD Cardoza at bedside.

## 2023-09-20 NOTE — H&P ADULT - HISTORY OF PRESENT ILLNESS
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 ED after <1 minute of dysarthria this morning. Pt is seen outpatient by Dr. Oshea and called vascular neurology outpatient NP Lynda who directed pt to come to the ED.  LKW: 6:20am Pt states she woke up at 6:20 and was speaking with her  normally and began to make her josef lunch with  while he made coffee, as they were talking about the news her speech was slurred during a single word and she tired to say it correctly and could not, then she tired again and her speech was normal. Both her and her  were alarmed enough to call her outpatient neurology team to discuss as her first stroke symptoms were L facial? and dysarthria that lasted 2 hours. Pt is compliant with her Eliquis and has never missed a dose. 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 She sought out a rheumatologist at [place] who does research on APS  ( Dr. Hickman [phone]) who she states has given her the okay to continue her Eliquis even formal diagnosis of APS is made. Pt denies any visual, sensory, or motor deficits during this episode or any other episodes since May 2022. She states she asked her  to look at her right after and see if her face from asymmetric. Pt cardiology follow-up (General, EPS and Structural) are all at [place].           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 ED after <1 minute of dysarthria this morning. Pt is seen outpatient by Dr. Oshea and called vascular neurology outpatient NP Lynda who directed pt to come to the ED.  LKW: 6:20am Pt states she woke up at 6:20 and was speaking with her  normally and began to make her josef lunch with  while he made coffee, as they were talking about the news her speech was slurred during a single word and she tired to say it correctly and could not, then she tired again and her speech was normal. Both her and her  were alarmed enough to call her outpatient neurology team to discuss as her first stroke symptoms were decreased face sensation and dysarthria that lasted 2 hours. Pt is compliant with her Eliquis and has never missed a dose. 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 She sought out a rheumatologist at [place] who does research on APS  ( Dr. Hickman [phone]) who she states has given her the okay to continue her Eliquis even formal diagnosis of APS is made. Pt denies any visual, sensory, or motor deficits during this episode or any other episodes since May 2022. She states she asked her  to look at her right after and see if her face from asymmetric. Pt cardiology follow-up (General, EPS and Structural) are all at [place].

## 2023-09-20 NOTE — H&P ADULT - NSHPREVIEWOFSYSTEMS_GEN_ALL_CORE
ROS:   Constitutional: No fever, weight loss or fatigue  Eyes: No eye pain, visual disturbances, or discharge  ENMT:  No difficulty hearing, tinnitus, vertigo; No sinus or throat pain  Neck: No pain or stiffness  Respiratory: No cough, wheezing, chills or hemoptysis  Cardiovascular: No chest pain, palpitations, shortness of breath, dizziness or leg swelling  Gastrointestinal: No abdominal pain. No nausea, vomiting or hematemesis; No diarrhea or constipation. No hematochezia.  Genitourinary: No dysuria, frequency, hematuria or incontinence  Neurological: As per HPI  Skin: No itching, burning, rashes or lesions   Endocrine: No heat or cold intolerance; No hair loss  Musculoskeletal: No joint pain or swelling; No muscle, back or extremity pain  Psychiatric: No depression, anxiety, mood swings or difficulty sleeping  Heme/Lymph: No easy bruising or bleeding gums

## 2023-09-20 NOTE — H&P ADULT - NSICDXPASTSURGICALHX_GEN_ALL_CORE_FT
PAST SURGICAL HISTORY:  Knee meniscus pain     S/P myomectomy     S/P patent foramen ovale closure

## 2023-09-20 NOTE — PATIENT PROFILE ADULT - FOOD INSECURITY
Is This A New Presentation, Or A Follow-Up?: Follow Up Alopecia [place]reata Additional History: Pt notes she was doing ILK injections in the past. Was helpful but now breaking in new spots no

## 2023-09-20 NOTE — ED PROVIDER NOTE - PHYSICAL EXAMINATION
GEN: Well appearing, well developed, awake, alert, oriented to person, place, time/situation and in no apparent distress. NTAF  ENT: Airway patent, Nasal mucosa clear. Mouth with normal mucosa.  EYES: Clear bilaterally. PERRL, EOMI  RESPIRATORY: Breathing comfortably with normal RR.   CARDIAC: Regular rate and rhythm, no M/R/G  MSK: Range of motion is not limited, no deformities noted.  NEURO: Alert and oriented, no focal deficits. Please refer to neuro note for detailed exam.   SKIN: Skin normal color for race, warm, dry and intact. No evidence of rash.  PSYCH: Alert and oriented to person, place, time/situation. normal mood and affect. no apparent risk to self or others.

## 2023-09-20 NOTE — ED PROVIDER NOTE - CRITICAL CARE ATTENDING CONTRIBUTION TO CARE
Upon my evaluation, this patient had a high probability of imminent or life-threatening deterioration due to CVA/TIA sx which required my direct attention, intervention, and personal management.    I have personally provided the above minutes of critical care time exclusive of time spent on separately billable procedures. Time includes review of laboratory data, radiology results, discussion with consultants, and monitoring for potential decompensation. Interventions were performed as documented above.

## 2023-09-20 NOTE — H&P ADULT - NSHPLABSRESULTS_GEN_ALL_CORE
Fingerstick Blood Glucose: CAPILLARY BLOOD GLUCOSE      POCT Blood Glucose.: 81 mg/dL (20 Sep 2023 11:37)    LABS:                        9.7    6.19  )-----------( 313      ( 20 Sep 2023 12:08 )             30.8     09-20    138  |  103  |  16  ----------------------------<  94  4.1   |  24  |  0.96    Ca    9.8      20 Sep 2023 12:08    TPro  8.0  /  Alb  4.8  /  TBili  0.4  /  DBili  x   /  AST  25  /  ALT  19  /  AlkPhos  56  09-20    PT/INR - ( 20 Sep 2023 12:08 )   PT: 17.1 sec;   INR: 1.52          PTT - ( 20 Sep 2023 12:08 )  PTT:39.9 sec      Urinalysis Basic - ( 20 Sep 2023 12:08 )    Color: x / Appearance: x / SG: x / pH: x  Gluc: 94 mg/dL / Ketone: x  / Bili: x / Urobili: x   Blood: x / Protein: x / Nitrite: x   Leuk Esterase: x / RBC: x / WBC x   Sq Epi: x / Non Sq Epi: x / Bacteria: x        RADIOLOGY & ADDITIONAL STUDIES:    CT Brain Stroke Protocol (09.20.23 @ 11:52) >    Impression: No acute intracranial injury.      CT Angio Brain Stroke Protocol  w/ IV Cont (09.20.23 @ 12:13) >    Intracranial CTA: No large vessel occlusion or high-grade stenosis    Extracranial CTA: No steno-occlusive disease

## 2023-09-20 NOTE — H&P ADULT - NSHPPHYSICALEXAM_GEN_ALL_CORE
Physical exam:  General: No acute distress, awake and alert  Cardiovascular: Regular rate and rhythm on monitor  Pulmonary: No use of accessory muscles      Neurologic:  -Mental status: Awake, alert, oriented to person, place, and time. Speech is fluent with intact naming, repetition, and comprehension, no dysarthria. Recent and remote memory intact. Follows commands. Attention/concentration intact. Fund of knowledge appropriate.  -Cranial nerves:   II: Visual fields are full to confrontation.  III, IV, VI: Extraocular movements are intact without nystagmus. Pupils equally round and reactive to light  V:  Facial sensation V1-V3 equal and intact   VII: Face is symmetric with normal eye closure and smile  VIII: Hearing is bilaterally intact to voice  IX, X: Uvula is midline and soft palate rises symmetrically  XI: Head turning and shoulder shrug are intact.  XII: Tongue protrudes midline  Motor: Normal bulk and tone. No pronator drift. Strength bilateral upper extremity 5/5, bilateral lower extremities 5/5.  Rapid alternating movements intact and symmetric  Sensation: Intact to light touch bilaterally. No neglect or extinction on double simultaneous testing.  Coordination: No dysmetria on finger-to-nose and heel-to-shin bilaterally  Gait: Narrow gait and steady    NIHSS: 0

## 2023-09-20 NOTE — ED PROVIDER NOTE - OBJECTIVE STATEMENT
43F with a h/o TIA in the past, afib, PFO closure, ?antiphospholipid syndrome who p/w episode of slurred speech @ 645 this AM, resolving a few seconds without intervention. States, "it sounded the same as when I last had a stroke." Pt is on eliquis 5mg BID, took her dose today.   Stroke code called upon arrival to the ER.

## 2023-09-20 NOTE — ED ADULT TRIAGE NOTE - BP NONINVASIVE SYSTOLIC (MM HG)
[de-identified] : Patient is having a cervical epidural tomorrow.  We discussed his history of Complex Regional Pain Syndrome.\par \par He was started on Gabapentin 600 mg twice a day. He has gotten good relief with it.\par He takes Tramadol at bedtime. 112

## 2023-09-20 NOTE — H&P ADULT - NS ATTEND AMEND GEN_ALL_CORE FT
The patient is a 43 year old female with a PMH of APLS (+Anti-CL titers), prior ischemic stroke, a fib, s/p PFO closure. She is on Eliquis 5 mg BID, compliant. She is admitted after a brief (seconds) period of dysarthria. Plan for MRI, TTE, heme consult. If MRI is +/changed from prior, may need to consider transitioning to warfarin or adding aspirin to Eliquis as patient is resistant to change in a/c. Remainder as above. The patient is a 43 year old female with a PMH of APLS (+Anti-CL titers), prior ischemic stroke, a fib, s/p PFO closure. She is on Eliquis 5 mg BID, compliant. She is admitted after a brief (seconds) period of dysarthria. Plan for MRI, TTE, heme consult. If MRI is +/changed from prior, may need to consider transitioning to warfarin or adding aspirin to Eliquis as patient is resistant to change in a/c. Will temporarily start therapeutic Lovenox. Remainder as above. The patient is a 43 year old female with a PMH of APLS (+Anti-CL titers), prior ischemic stroke, a fib, s/p PFO closure. She is on Eliquis 5 mg BID, compliant. She is admitted after a brief (seconds) period of dysarthria. Plan for MRI, TTE, heme consult. If MRI is +/changed from prior, may need to consider transitioning to warfarin+ASA or adding aspirin to Eliquis as patient is resistant to change in a/c. Will temporarily start therapeutic Lovenox. Remainder as above.

## 2023-09-21 ENCOUNTER — TRANSCRIPTION ENCOUNTER (OUTPATIENT)
Age: 43
End: 2023-09-21

## 2023-09-21 VITALS — RESPIRATION RATE: 20 BRPM | HEART RATE: 66 BPM | DIASTOLIC BLOOD PRESSURE: 52 MMHG | SYSTOLIC BLOOD PRESSURE: 89 MMHG

## 2023-09-21 LAB
A1C WITH ESTIMATED AVERAGE GLUCOSE RESULT: 5.5 % — SIGNIFICANT CHANGE UP (ref 4–5.6)
ANION GAP SERPL CALC-SCNC: 8 MMOL/L — SIGNIFICANT CHANGE UP (ref 5–17)
B2 GLYCOPROT1 AB SER QL: NEGATIVE — SIGNIFICANT CHANGE UP
BUN SERPL-MCNC: 17 MG/DL — SIGNIFICANT CHANGE UP (ref 7–23)
CALCIUM SERPL-MCNC: 9.2 MG/DL — SIGNIFICANT CHANGE UP (ref 8.4–10.5)
CARDIOLIPIN AB SER-ACNC: POSITIVE
CARDIOLIPIN IGM SER-MCNC: 53.8 MPL — HIGH (ref 0–12.5)
CARDIOLIPIN IGM SER-MCNC: <5 GPL — SIGNIFICANT CHANGE UP (ref 0–12.5)
CHLORIDE SERPL-SCNC: 104 MMOL/L — SIGNIFICANT CHANGE UP (ref 96–108)
CHOLEST SERPL-MCNC: 118 MG/DL — SIGNIFICANT CHANGE UP
CO2 SERPL-SCNC: 24 MMOL/L — SIGNIFICANT CHANGE UP (ref 22–31)
CREAT SERPL-MCNC: 0.98 MG/DL — SIGNIFICANT CHANGE UP (ref 0.5–1.3)
DEPRECATED CARDIOLIPIN IGA SER: <5 APL — SIGNIFICANT CHANGE UP (ref 0–12.5)
EGFR: 73 ML/MIN/1.73M2 — SIGNIFICANT CHANGE UP
ESTIMATED AVERAGE GLUCOSE: 111 MG/DL — SIGNIFICANT CHANGE UP (ref 68–114)
GLUCOSE SERPL-MCNC: 89 MG/DL — SIGNIFICANT CHANGE UP (ref 70–99)
HCT VFR BLD CALC: 27.9 % — LOW (ref 34.5–45)
HDLC SERPL-MCNC: 53 MG/DL — SIGNIFICANT CHANGE UP
HGB BLD-MCNC: 8.9 G/DL — LOW (ref 11.5–15.5)
LIPID PNL WITH DIRECT LDL SERPL: 56 MG/DL — SIGNIFICANT CHANGE UP
MAGNESIUM SERPL-MCNC: 2 MG/DL — SIGNIFICANT CHANGE UP (ref 1.6–2.6)
MCHC RBC-ENTMCNC: 24.4 PG — LOW (ref 27–34)
MCHC RBC-ENTMCNC: 31.9 GM/DL — LOW (ref 32–36)
MCV RBC AUTO: 76.4 FL — LOW (ref 80–100)
NON HDL CHOLESTEROL: 65 MG/DL — SIGNIFICANT CHANGE UP
NRBC # BLD: 0 /100 WBCS — SIGNIFICANT CHANGE UP (ref 0–0)
PHOSPHATE SERPL-MCNC: 3.9 MG/DL — SIGNIFICANT CHANGE UP (ref 2.5–4.5)
PLATELET # BLD AUTO: 274 K/UL — SIGNIFICANT CHANGE UP (ref 150–400)
POTASSIUM SERPL-MCNC: 4.3 MMOL/L — SIGNIFICANT CHANGE UP (ref 3.5–5.3)
POTASSIUM SERPL-SCNC: 4.3 MMOL/L — SIGNIFICANT CHANGE UP (ref 3.5–5.3)
RBC # BLD: 3.65 M/UL — LOW (ref 3.8–5.2)
RBC # FLD: 13.6 % — SIGNIFICANT CHANGE UP (ref 10.3–14.5)
SODIUM SERPL-SCNC: 136 MMOL/L — SIGNIFICANT CHANGE UP (ref 135–145)
TRIGL SERPL-MCNC: 44 MG/DL — SIGNIFICANT CHANGE UP
TSH SERPL-MCNC: 3.33 UIU/ML — SIGNIFICANT CHANGE UP (ref 0.27–4.2)
WBC # BLD: 4.96 K/UL — SIGNIFICANT CHANGE UP (ref 3.8–10.5)
WBC # FLD AUTO: 4.96 K/UL — SIGNIFICANT CHANGE UP (ref 3.8–10.5)

## 2023-09-21 PROCEDURE — 99223 1ST HOSP IP/OBS HIGH 75: CPT

## 2023-09-21 PROCEDURE — 97161 PT EVAL LOW COMPLEX 20 MIN: CPT

## 2023-09-21 PROCEDURE — 85730 THROMBOPLASTIN TIME PARTIAL: CPT

## 2023-09-21 PROCEDURE — 97165 OT EVAL LOW COMPLEX 30 MIN: CPT

## 2023-09-21 PROCEDURE — 70450 CT HEAD/BRAIN W/O DYE: CPT | Mod: MA

## 2023-09-21 PROCEDURE — 0042T: CPT | Mod: MA

## 2023-09-21 PROCEDURE — 84484 ASSAY OF TROPONIN QUANT: CPT

## 2023-09-21 PROCEDURE — 84100 ASSAY OF PHOSPHORUS: CPT

## 2023-09-21 PROCEDURE — 86147 CARDIOLIPIN ANTIBODY EA IG: CPT

## 2023-09-21 PROCEDURE — 83036 HEMOGLOBIN GLYCOSYLATED A1C: CPT

## 2023-09-21 PROCEDURE — 70551 MRI BRAIN STEM W/O DYE: CPT

## 2023-09-21 PROCEDURE — 84702 CHORIONIC GONADOTROPIN TEST: CPT

## 2023-09-21 PROCEDURE — 85027 COMPLETE CBC AUTOMATED: CPT

## 2023-09-21 PROCEDURE — 70496 CT ANGIOGRAPHY HEAD: CPT | Mod: MA

## 2023-09-21 PROCEDURE — 83735 ASSAY OF MAGNESIUM: CPT

## 2023-09-21 PROCEDURE — 99291 CRITICAL CARE FIRST HOUR: CPT | Mod: 25

## 2023-09-21 PROCEDURE — 85613 RUSSELL VIPER VENOM DILUTED: CPT

## 2023-09-21 PROCEDURE — 80048 BASIC METABOLIC PNL TOTAL CA: CPT

## 2023-09-21 PROCEDURE — 85598 HEXAGNAL PHOSPH PLTLT NEUTRL: CPT

## 2023-09-21 PROCEDURE — 71045 X-RAY EXAM CHEST 1 VIEW: CPT

## 2023-09-21 PROCEDURE — 80053 COMPREHEN METABOLIC PANEL: CPT

## 2023-09-21 PROCEDURE — 70498 CT ANGIOGRAPHY NECK: CPT | Mod: MA

## 2023-09-21 PROCEDURE — 99221 1ST HOSP IP/OBS SF/LOW 40: CPT

## 2023-09-21 PROCEDURE — 93306 TTE W/DOPPLER COMPLETE: CPT

## 2023-09-21 PROCEDURE — 85025 COMPLETE CBC W/AUTO DIFF WBC: CPT

## 2023-09-21 PROCEDURE — 99233 SBSQ HOSP IP/OBS HIGH 50: CPT

## 2023-09-21 PROCEDURE — 85610 PROTHROMBIN TIME: CPT

## 2023-09-21 PROCEDURE — 81001 URINALYSIS AUTO W/SCOPE: CPT

## 2023-09-21 PROCEDURE — 80061 LIPID PANEL: CPT

## 2023-09-21 PROCEDURE — 36415 COLL VENOUS BLD VENIPUNCTURE: CPT

## 2023-09-21 PROCEDURE — 93005 ELECTROCARDIOGRAM TRACING: CPT

## 2023-09-21 PROCEDURE — 84443 ASSAY THYROID STIM HORMONE: CPT

## 2023-09-21 PROCEDURE — 82962 GLUCOSE BLOOD TEST: CPT

## 2023-09-21 PROCEDURE — 86146 BETA-2 GLYCOPROTEIN ANTIBODY: CPT

## 2023-09-21 RX ORDER — ACETAMINOPHEN 500 MG
650 TABLET ORAL EVERY 6 HOURS
Refills: 0 | Status: DISCONTINUED | OUTPATIENT
Start: 2023-09-21 | End: 2023-09-21

## 2023-09-21 RX ADMIN — ENOXAPARIN SODIUM 52 MILLIGRAM(S): 100 INJECTION SUBCUTANEOUS at 09:57

## 2023-09-21 RX ADMIN — PANTOPRAZOLE SODIUM 40 MILLIGRAM(S): 20 TABLET, DELAYED RELEASE ORAL at 07:06

## 2023-09-21 RX ADMIN — Medication 650 MILLIGRAM(S): at 12:30

## 2023-09-21 RX ADMIN — Medication 650 MILLIGRAM(S): at 11:47

## 2023-09-21 RX ADMIN — ATORVASTATIN CALCIUM 80 MILLIGRAM(S): 80 TABLET, FILM COATED ORAL at 01:10

## 2023-09-21 RX ADMIN — ENOXAPARIN SODIUM 52 MILLIGRAM(S): 100 INJECTION SUBCUTANEOUS at 01:10

## 2023-09-21 NOTE — CONSULT NOTE ADULT - ASSESSMENT
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 LKW: 620am. Pt was speaking with  when her speech became slurred for one word and again she tried to repeat it but then was back to normal for the rest of the sentence. Pt is seen outpatient by Dr. Oshea and called vascular neurology outpatient NP Lynda who directed pt to come to the ED. On physical exam, pt with no focal deficits, NIHSS: 0. Pt compliant on Eliquis, never missed a dose. 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 She sought out a rheumatologist at [facility] who does research on APS  ( Dr. Hickman [phone]) who she states has given her the okay to continue her Eliquis even formal diagnosis of APS is made. Pt denies any visual, sensory, or motor deficits during this episode or any other episodes since May 2022. She states she asked her  to look at her right after and see if her face from asymmetric. Pt cardiology follow-up (General, EPS and Structural) are all at [facility].  HCT: Negative. CTA: negative. Given complete resolution of symptoms that lasted a couple seconds, TNK was not given. Pt admitted to stroke for further workup of TIA.     Neuro  #TIA workup  - Hold Eliquis 5mg BID; Start Lovenox 1mg/kg BID dosing for possible APS  - Anticardiolipin, Lupus antibody and beta-2-glycoprotein drawn in ED (F/U results) before first lovenox dose at 9pm   - Start atorvastatin 80mg daily  - q4hr stroke neuro checks and vitals  - obtain MRI Brain without contrast  - Stroke Code HCT Results: negative   - Stroke Code CTA Results: negative   - Stroke education    #Hypercoag labs  - Anticardiolipin, Lupus antibody and beta-2-glycoprotein drawn in ED (F/U results) before first lovenox dose at 9pm   - Heme consulted    Cards  #HTN  - permissive hypertension, Goal -180  - obtain TTE with bubble, to monitor PFO closure   - Stroke Code EKG Results:    #HLD  - high dose statin as above in CVA  - pending LDL results:     Pulm  - call provider if SPO2 < 94%    GI  #Nutrition/Fluids/Electrolytes   - replete K<4 and Mg <2  - Diet: Regular   - IVF: none    #gastritis   - Start Pantoprazole 40mg BID    Renal  - trend BUN/Cr    Infectious Disease  - Stroke Code CXR results:   - Trend WBC, pt afebrile     Endocrine  - pending A1C results:   - penidng TSH results:    DVT Prophylaxis  - On Lovenox full dose   - SCDs for DVT prophylaxis       IDR Goals: Goals reviewed at interdisciplinary rounds with case management, social work, physical therapy, occupational therapy, and speech language pathology.   Please see specific therapy  notes for in depth goals.    Dispo: Pending PM&R consult ,   PT/OT eval      Discussed daily hospital plans and goals with patient and family at bedside.     Discussed with Neurology Attending Dr. Rapp

## 2023-09-21 NOTE — DISCHARGE NOTE PROVIDER - NSDCMRMEDTOKEN_GEN_ALL_CORE_FT
atorvastatin 80 mg oral tablet: 1 tab(s) orally once a day (at bedtime)  Eliquis 5 mg oral tablet: 5 milligram(s) orally 2 times a day  Lipitor 20 mg oral tablet: 1 orally  omeprazole 40 mg oral delayed release capsule: 1 orally 2 times a day   Eliquis 5 mg oral tablet: 5 milligram(s) orally 2 times a day  Lipitor 20 mg oral tablet: 1 orally  omeprazole 40 mg oral delayed release capsule: 1 orally 2 times a day

## 2023-09-21 NOTE — DISCHARGE NOTE PROVIDER - PROVIDER TOKENS
PROVIDER:[TOKEN:[[id number]]] PROVIDER:[TOKEN:[[id number]]],PROVIDER:[TOKEN:[[id number]]]

## 2023-09-21 NOTE — OCCUPATIONAL THERAPY INITIAL EVALUATION ADULT - LIVES WITH, PROFILE
Pt lives with family in apt. Pt at baseline is ind for ADLs and functional mobility. R handed and wears glasses for distance. NO DME needed.

## 2023-09-21 NOTE — CONSULT NOTE ADULT - ASSESSMENT
43 year old female with PMH of multiple positive anti cardiolipin antibodies, CVA (5/23, [employer], punctuate parietal, suspected embolic etiology), Afib (detecting with ILR last year with 3 hours of Afib started on eliquis 5mg BID; compliant), PFO closure (7/2022), gastritis and fibroid requiring myomectomy (7/2022) who presented with dysarthria c/f stroke/TIA, though imaging without evidence of acute embolism. Hematology consulted for recommendations given history of antiphospholipid antibody syndrome.    #Previous CVA (5/2022)  #Positive Anti-Cardiolipin Antibodies   #PFO s/p closure 7/2022 c/b atrial fibrillation outpatient    Per discussion with Neurology, patient's symptoms less likely from TIA. Regarding prior history of positive antiphospholipid syndrome, patient does meet laboratory criteria for antiphospholipid antibodies. She does not have prior history of VTE, though does have a history of CVA. Considering patient's bleeding risk with menorrhagia, unclear episode of whether TIA occurred, and patient preference for eliquis (lovenox and warfarin also offered to patient, though prefers eliquis given ease of use), we will elect for no change in anticoagulation at this time. Discussed with patient that if she does have a thromboembolic event in the future, she may need to switch to lovenox or warfarin at that time.    Plan  - continue eliquis   - Will need hematology follow up. Will email to schedule an appointment with Dr. Maggie Maher in 2 weeks.     Discussed with Dr. Gao. Recommendations are considered final after attending attestation.

## 2023-09-21 NOTE — DISCHARGE NOTE PROVIDER - CARE PROVIDERS DIRECT ADDRESSES
,[email or web address],DirectAddress_Unknown

## 2023-09-21 NOTE — CONSULT NOTE ADULT - SUBJECTIVE AND OBJECTIVE BOX
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 Pt is seen outpatient by Dr. Oshea and called vascular neurology outpatient NP Lynda who directed pt to come to the ED.  LKW: 6:20am Pt states she woke up at 6:20 and was speaking with her  normally and began to make her josef lunch with  while he made coffee, as they were talking about the news her speech was slurred during a single word and she tired to say it correctly and could not, then she tired again and her speech was normal. Both her and her  were alarmed enough to call her outpatient neurology team to discuss as her first stroke symptoms were decreased face sensation and dysarthria that lasted 2 hours. Pt is compliant with her Eliquis and has never missed a dose. 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 She sought out a rheumatologist at [facility] who does research on APS  ( Dr. Hickman [phone]) who she states has given her the okay to continue her Eliquis even formal diagnosis of APS is made. Pt denies any visual, sensory, or motor deficits during this episode or any other episodes since May 2022. She states she asked her  to look at her right after and see if her face from asymmetric. Pt cardiology follow-up (General, EPS and Structural) are all at [facility].          (20 Sep 2023 14:43)    PAST MEDICAL & SURGICAL HISTORY:  Gastritis      Cerebrovascular accident (CVA)      Atrial fibrillation      Knee meniscus pain      S/P myomectomy      S/P patent foramen ovale closure        MEDICATIONS  (STANDING):  atorvastatin 80 milliGRAM(s) Oral at bedtime  enoxaparin Injectable 52 milliGRAM(s) SubCutaneous every 12 hours  influenza   Vaccine 0.5 milliLiter(s) IntraMuscular once  pantoprazole    Tablet 40 milliGRAM(s) Oral two times a day    MEDICATIONS  (PRN):            FAMILY HISTORY:      CBC Full  -  ( 21 Sep 2023 06:07 )  WBC Count : 4.96 K/uL  RBC Count : 3.65 M/uL  Hemoglobin : 8.9 g/dL  Hematocrit : 27.9 %  Platelet Count - Automated : 274 K/uL  Mean Cell Volume : 76.4 fl  Mean Cell Hemoglobin : 24.4 pg  Mean Cell Hemoglobin Concentration : 31.9 gm/dL  Auto Neutrophil # : x  Auto Lymphocyte # : x  Auto Monocyte # : x  Auto Eosinophil # : x  Auto Basophil # : x  Auto Neutrophil % : x  Auto Lymphocyte % : x  Auto Monocyte % : x  Auto Eosinophil % : x  Auto Basophil % : x      09-21    136  |  104  |  17  ----------------------------<  89  4.3   |  24  |  0.98    Ca    9.2      21 Sep 2023 06:07  Mg     2.0     09-21    TPro  8.0  /  Alb  4.8  /  TBili  0.4  /  DBili  x   /  AST  25  /  ALT  19  /  AlkPhos  56  09-20      Urinalysis Basic - ( 21 Sep 2023 06:07 )    Color: x / Appearance: x / SG: x / pH: x  Gluc: 89 mg/dL / Ketone: x  / Bili: x / Urobili: x   Blood: x / Protein: x / Nitrite: x   Leuk Esterase: x / RBC: x / WBC x   Sq Epi: x / Non Sq Epi: x / Bacteria: x        Radiology :     < from: CT Brain Stroke Protocol (09.20.23 @ 11:52) >    ACC: [id number] EXAM:  CT BRAIN STROKE PROTOCOL   ORDERED BY: KAYE RAMON     PROCEDURE DATE:  09/20/2023          INTERPRETATION:  Stroke code.    Technique: CT head was performed utilizing axial images from the base of   the skull through the vertex without the administration of intravenous   contrast. Images were reviewed in the bone, brain and subdural windows.   RAPID artificial intelligence was utilized for the preliminary evaluation   of intracranial hemorrhage    Findings: There are no prior studies available for comparison.    There is no evidence of acute intracranial hemorrhage or acute   transcortical infarct.  The ventricles are normal in size and caliber.  There is no evidence of mass-effect or midline shift. There is no   evidence of an intra or extra-axial fluid collection.    Visualized paranasal sinuses and bilateral mastoid air cells are clear.    Impression: No acute intracranial injury.      < from: MR Head No Cont (09.20.23 @ 22:24) >  ACC: [id number] EXAM:  MR BRAIN   ORDERED BY: YOGESH KOENIG     PROCEDURE DATE:  09/20/2023          INTERPRETATION:  *********PRELIMINARY REPORT - FULL ATTENDING REPORT TO   FOLLOW******    FINDINGS/  IMPRESSION:  There are no findings which are hyperintense on DWI and hypointense on   ADC to suggest acute infarction.                  OFFICIAL REPORT:    CLINICAL STATEMENT: Pain.    TECHNIQUE: MRI of the brain was performed without gadolinium.    COMPARISON: CT head 9/20/2023    FINDINGS:      There are scattered punctate nonspecific T2 prolongation signal   abnormalities in the periventricular subcortical white matter which may   be related to gliosis, migraine headaches, common in this patient's age   group. Other etiologies including inflammatory/infectious conditions and   demyelinating disease not excluded    There is no acute parenchymal hemorrhage, parenchymal mass, mass effect   or midline shift. There is no extra-axial fluid collection.  There is no   hydrocephalus.  There is no acute infarct.    Small retention cyst/polyp right maxillary sinus noted    IMPRESSION:  No acute intracranial hemorrhage or acute infarct.    Nonspecific mild white matter signal changes.             Review of Systems : per HPI         Vital Signs Last 24 Hrs  T(C): 37.1 (21 Sep 2023 05:06), Max: 37.2 (20 Sep 2023 17:35)  T(F): 98.8 (21 Sep 2023 05:06), Max: 99 (20 Sep 2023 17:35)  HR: 61 (21 Sep 2023 04:40) (61 - 82)  BP: 99/50 (21 Sep 2023 04:40) (92/55 - 118/67)  BP(mean): 72 (21 Sep 2023 04:40) (68 - 89)  RR: 62 (21 Sep 2023 04:40) (16 - 62)  SpO2: 100% (21 Sep 2023 04:40) (100% - 100%)    Parameters below as of 21 Sep 2023 04:40  Patient On (Oxygen Delivery Method): room air            Physical Exam :  43 y o woman lying comfortably in semi Moore's position , awake , alert , no acute complaints     Head : normocephalic , atraumatic    Eyes : PERRLA , EOMI , no nystagmus , sclera anicteric    ENT / FACE : neg nasal discharge , uvula midline , no oropharyngeal erythema / exudate    Neck : supple , negative JVD , negative carotid bruits , no thyromegaly    Chest : CTA bilaterally , neg wheeze / rhonchi / rales / crackles / egophany    Cardiovascular : regular rate and rhythm , neg murmurs / rubs / gallops    Abdomen : soft , non distended , non tender to palpation in all 4 quadrants ,  normal bowel sounds     Extremities : WWP , neg cyanosis /clubbing / edema     Neurologic Exam :     Alert and oriented to person , place , date/year , speech fluent w/o dysarthria , follows commands , recent and remote memory intact , repetition intact , comprehension intact ,  attention/concentration intact , fund of knowledge appropriate    Cranial Nerves:     II :                         pupils equal , round and reactive to light , visual fields intact   III/ IV/VI :              extraocular movements intact , neg nystagmus , neg ptosis  V :                        facial sensation intact , V1-3 normal  VII :                      face symmetric , no droop , normal eye closure and smile  VIII :                     hearing intact to finger rub bilaterally  IX and X :             no hoarseness , gag intact , palate/ uvula rise symmetrically  XI :                       SCM / trapezius strength intact bilateral  XII :                      no tongue deviation    Motor Exam:          Right UE:                5/5 :     5/5 :   wrist extensors/ flexors  5/5 :   biceps  5/5 :   triceps  5/5 :   deltoid    negative pronator drift    Left UE:                  5/5 :     5/5 :   wrist extensors/ flexors  5/5 :   biceps  5/5 :   triceps  5/5 :   deltoid    negative pronator drift        Right LE:     5/5 : dorsiflexors   5/5 : plantar flexors  5/5 : quadriceps  5/5 : hamstrings  5/5 : hip flexors      Left LE:     5/5 : dorsiflexors   5/5 : plantar flexors  5/5 : quadriceps  5/5 : hamstrings  5/5 : hip flexors      Sensation :         intact to light touch x 4 extremities                            no neglect or extinction on double simultaneous testing      DTR :     biceps/brachioradialis : equal                      patella/ankle : equal     neg Babinski       Coordination :      Finger to Nose :  neg dysmetria bilaterally       Gait :  not tested            PM&R Impression :     admitted for transient slurred speech    - no acute pathology on CT and MRI brain imaging     - no focal neurologic deficits         Recommendations / Plan :       1) Physical / Occupational therapy focusing on therapeutic exercises , equipment evaluation , bed mobility/transfer out of bed evaluation , progressive ambulation with assistive devices prn .    2) Current disposition plan recommendation  :    d/c home

## 2023-09-21 NOTE — PHYSICAL THERAPY INITIAL EVALUATION ADULT - MODALITIES TREATMENT COMMENTS
Cranial Nerves II - XII: II: PERRLA; visual fields are full to confrontation III, IV, VI: EOMI, no nystagmus appreciated V: facial sensation intact to light touch V1-V3 b/l VII: no ptosis, no facial droop, symmetric eyebrow raise and closure VIII: hearing intact to finger rub b/l  XI: head turning and shoulder shrug intact b/l XII: tongue protrusion midline  Vision: quadrants and tracking intact

## 2023-09-21 NOTE — DISCHARGE NOTE PROVIDER - NSDCFUADDAPPT_GEN_ALL_CORE_FT
You will receive a call from the neurology office to set up an outpatient follow up appointment. If you do not receive a call within one week of discharge, please call [phone] and ask to make an appointment with a provider from the stroke team.    Follow up with your PCP and hematology within 2 weeks of discharge.

## 2023-09-21 NOTE — PHYSICAL THERAPY INITIAL EVALUATION ADULT - PERTINENT HX OF CURRENT PROBLEM, REHAB EVAL
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 Pt is seen outpatient by Dr. Oshea and called vascular neurology outpatient NP Lynda who directed pt to come to the ED.  LKW: 6:20am Pt states she woke up at 6:20 and was speaking with her  normally and began to make her josef lunch with  while he made coffee, as they were talking about the news her speech was slurred during a single word and she tired to say it correctly and could not, then she tired again and her speech was normal. Both her and her  were alarmed enough to call her outpatient neurology team to discuss as her first stroke symptoms were decreased face sensation and dysarthria that lasted 2 hours. Pt is compliant with her Eliquis and has never missed a dose. 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 She sought out a rheumatologist at [facility] who does research on APS  ( Dr. Hickman [phone]) who she states has given her the okay to continue her Eliquis even formal diagnosis of APS is made. Pt denies any visual, sensory, or motor deficits during this episode or any other episodes since May 2022. She states she asked her  to look at her right after and see if her face from asymmetric. Pt cardiology follow-up (General, EPS and Structural) are all at [facility].

## 2023-09-21 NOTE — DISCHARGE NOTE PROVIDER - CARE PROVIDER_API CALL
Laurent Oshea  Neurology  [address] [address]  Phone: [phone]  Fax: [phone]  Follow Up Time:    Laurent Oshea  Neurology  [address] [address]  Phone: [phone]  Fax: [phone]  Follow Up Time:     Luana Paul  Hematology  [address]  [place] [address]  Phone: [phone]  Fax: [phone]  Follow Up Time:

## 2023-09-21 NOTE — DISCHARGE NOTE NURSING/CASE MANAGEMENT/SOCIAL WORK - NSDCFUADDAPPT_GEN_ALL_CORE_FT
You will receive a call from the neurology office to set up an outpatient follow up appointment. If you do not receive a call within one week of discharge, please call [phone] and ask to make an appointment with a provider from the stroke team.    Follow up with your PCP and hematology within 2 weeks of discharge.

## 2023-09-21 NOTE — CONSULT NOTE ADULT - SUBJECTIVE AND OBJECTIVE BOX
Hematology Oncology Consult Note ( )      43 year old female with PMH of multiple positive ani cardiolipin antibodies, CVA (, At [employer], punctuate parietal, suspected embolic etiology), Afib (detecting with ILR last year with 3 hours of Afib started on eliquis 5mg BID; compliant), prior history of hemorrhagic stroke  and PFO closure (2022), gastritis and fibroid requiring myomectomy (2022) who presented to  ED after < 1 minute of dysarthria this morning and was speaking with her  normally, when she suddenly started to slur her words as her previous stroke symptoms were dysarthria. Patient reports she is compliant with her eliquis, and has never missed a dose. Patient also has a history of multiple first trimester miscarriages.     MRI head with no acute intracranial hemorrhage, parenchymal mass, mass effect or midline shift. There is no extra axial fluid colleciton. There is no hydrocephalus. There is no acute infarct. Per Neurology, concern for TIA, and so hematology is consulted for concern for failure of anticoagualtion    2023  APTT 38.9   APTT 50/50 35.9  2 hour incubation 41.3  DRVVT lupus anticoagulant negative  silica clotting LA negative  Anticardiolipin, dRVVT Confirm 54.4  23 - anticardiolipin IgM, Serum - 46.6  23 positive Anticardiolipin IgM, Serum 107.2  22 Positive Anticardiolipin - IgM, 34.0   22 Positive Anticardiolipin - IgM, Serum 45.8  Beta 2 glycoprotein 23, 22, 22, 22  LA negative 23, 22, 23      Family Hx     Social Hx      ROS is otherwise negative.    HEALTH MAINTENANCE:  Breast: last mammogram was performed on ....    Cervical: last pap smear was performed on ....  Colon: last colonoscopy was performed on ....  Prostate: last PSA was ....  Lung: a low dose helical CT was performed on....for RF's including x ppy cigs and current smoker/quit <15 yrs ago       PAST MEDICAL & SURGICAL HISTORY:  Gastritis      Cerebrovascular accident (CVA)      Atrial fibrillation      Knee meniscus pain      S/P myomectomy      S/P patent foramen ovale closure          Allergies:  No Known Allergies      Medications:  enoxaparin Injectable 52 milliGRAM(s) SubCutaneous every 12 hours        Social History:    FAMILY HISTORY:      PHYSICAL EXAM:    T(F): 98.8 (23 @ 14:37), Max: 99.2 (23 @ 09:10)  HR: 61 (23 @ 12:17) (61 - 90)  BP: 92/50 (23 @ 12:17) (89/51 - 115/74)  RR: 24 (23 @ 12:17) (19 - 62)  SpO2: 100% (23 @ 04:40) (100% - 100%)  Wt(kg): --    Daily     Daily Weight in k.8 (20 Sep 2023 16:00)    Gen: well developed, well nourished, comfortable  HEENT: normocephalic/atraumatic, no conjunctival pallor, no scleral icterus, no oral thrush/mucosal bleeding/mucositis  Neck: supple, no masses, no JVD  Breasts: deferred  Back: nontender  Cardiovascular: RR, nl S1S2, no murmurs/rubs/gallops  Respiratory: clear air entry b/l  Gastrointestinal: BS+, soft, NT/ND, no masses, no splenomegaly, no hepatomegaly, no evidence for ascites  Genitourinary: deferred  Rectal: deferred  Extremities: no clubbing/cyanosis, no edema, no calf tenderness  Vascular:  DP/PT 2+ b/l  Neurological: CN 2-12 grossly intact, no focal deficits  Skin: no rash on visible skin  Lymph Nodes:  no cervical/supraclavicular LAD, no axillary/groin LAD  Musculoskeletal:  full ROM  Psychiatric:  mood stable            Labs:                          8.9    4.96  )-----------( 274      ( 21 Sep 2023 06:07 )             27.9     CBC Full  -  ( 21 Sep 2023 06:07 )  WBC Count : 4.96 K/uL  RBC Count : 3.65 M/uL  Hemoglobin : 8.9 g/dL  Hematocrit : 27.9 %  Platelet Count - Automated : 274 K/uL  Mean Cell Volume : 76.4 fl  Mean Cell Hemoglobin : 24.4 pg  Mean Cell Hemoglobin Concentration : 31.9 gm/dL  Auto Neutrophil # : x  Auto Lymphocyte # : x  Auto Monocyte # : x  Auto Eosinophil # : x  Auto Basophil # : x  Auto Neutrophil % : x  Auto Lymphocyte % : x  Auto Monocyte % : x  Auto Eosinophil % : x  Auto Basophil % : x    PT/INR - ( 20 Sep 2023 12:08 )   PT: 17.1 sec;   INR: 1.52          PTT - ( 20 Sep 2023 12:08 )  PTT:39.9 sec        136  |  104  |  17  ----------------------------<  89  4.3   |  24  |  0.98    Ca    9.2      21 Sep 2023 06:07  Phos  3.9     -  Mg     2.0         TPro  8.0  /  Alb  4.8  /  TBili  0.4  /  DBili  x   /  AST  25  /  ALT  19  /  AlkPhos  56  -      Urinalysis Basic - ( 21 Sep 2023 06:07 )    Color: x / Appearance: x / SG: x / pH: x  Gluc: 89 mg/dL / Ketone: x  / Bili: x / Urobili: x   Blood: x / Protein: x / Nitrite: x   Leuk Esterase: x / RBC: x / WBC x   Sq Epi: x / Non Sq Epi: x / Bacteria: x        Other Labs:    Cultures:    Pathology:    Imaging Studies:       Hematology Oncology Consult Note     43 year old female with PMH of multiple positive ani cardiolipin antibodies, CVA (, At [employer], punctuate parietal, suspected embolic etiology), Afib (detecting with ILR last year with 3 hours of Afib started on eliquis 5mg BID; compliant), PFO closure (2022), gastritis and fibroid requiring myomectomy (2022) who presented to  ED after < 1 minute of dysarthria this morning and was speaking with her  normally, when she suddenly started to slur her words as her previous stroke symptoms were dysarthria. MRI showed no acute intracranial hemorrhage or infarct, though clinical suspicion is TIA per Neurology, and Hematology is consulted for anticoagulation recommendations.     Patient reports she had a CVA in May 2022, and was given one dose of eliquis prior to being on a flight, and then placed on aspirin/plavix thereafter. She then had a PFO closure in 2022, after which she had atrial fibrillation episode lasting longer than 3h, and was subsequently placed on eliquis 5mg BID. Patient reports she is compliant with her eliquis, and has never missed a dose. Patient also reports she had two miscarriages (one "vanishing twin syndrome), both in the first trimester. She has never had any late trimester abortions. She reports her periods are heavy, and reports a history of fibroids s/p myomectomy and is currently considering embolization. Patient is currently on lovenox for this admission    She also reports that her mother had one miscarriage in early trimester. She reports her grandfather "had a heart attack when young" and grandmother had a stroke. She denies any known history of blood disorders       Prior labs significant for   2023  APTT 38.9   APTT 50/50 35.9  2 hour incubation 41.3  DRVVT lupus anticoagulant negative  silica clotting LA negative  Anticardiolipin, dRVVT Confirm 54.4  23 - anticardiolipin IgM, Serum - 46.6  23 positive Anticardiolipin IgM, Serum 107.2  22 Positive Anticardiolipin - IgM, 34.0   22 Positive Anticardiolipin - IgM, Serum 45.8  Beta 2 glycoprotein negative23, 22, 22, 22  LA negative 23, 22, 23      PAST MEDICAL & SURGICAL HISTORY:  Gastritis      Cerebrovascular accident (CVA)      Atrial fibrillation      Knee meniscus pain      S/P myomectomy      S/P patent foramen ovale closure          Allergies:  No Known Allergies      Medications:  enoxaparin Injectable 52 milliGRAM(s) SubCutaneous every 12 hours        Social History:    FAMILY HISTORY:      PHYSICAL EXAM:    T(F): 98.8 (23 @ 14:37), Max: 99.2 (23 @ 09:10)  HR: 61 (23 @ 12:17) (61 - 90)  BP: 92/50 (23 @ 12:17) (89/51 - 115/74)  RR: 24 (23 @ 12:17) (19 - 62)  SpO2: 100% (23 @ 04:40) (100% - 100%)  Wt(kg): --    Daily     Daily Weight in k.8 (20 Sep 2023 16:00)    Gen: well developed, well nourished, comfortable  HEENT: normocephalic/atraumatic, no conjunctival pallor, no scleral icterus, no oral thrush/mucosal bleeding/mucositis  Cardiovascular: RR, nl S1S2, no murmurs/rubs/gallops  Respiratory: clear air entry b/l  Gastrointestinal: BS+, soft, NT/ND, no masses, no splenomegaly, no hepatomegaly, no evidence for ascites  Extremities: no clubbing/cyanosis, no edema, no calf tenderness  Vascular:  DP/PT 2+ b/l  Neurological: CN 2-12 grossly intact, no focal deficits  Skin: no rash on visible skin  Lymph Nodes:  no cervical/supraclavicular LAD, no axillary/groin LAD  Musculoskeletal:  full ROM  Psychiatric:  mood stable            Labs:                          8.9    4.96  )-----------( 274      ( 21 Sep 2023 06:07 )             27.9     CBC Full  -  ( 21 Sep 2023 06:07 )  WBC Count : 4.96 K/uL  RBC Count : 3.65 M/uL  Hemoglobin : 8.9 g/dL  Hematocrit : 27.9 %  Platelet Count - Automated : 274 K/uL  Mean Cell Volume : 76.4 fl  Mean Cell Hemoglobin : 24.4 pg  Mean Cell Hemoglobin Concentration : 31.9 gm/dL  Auto Neutrophil # : x  Auto Lymphocyte # : x  Auto Monocyte # : x  Auto Eosinophil # : x  Auto Basophil # : x  Auto Neutrophil % : x  Auto Lymphocyte % : x  Auto Monocyte % : x  Auto Eosinophil % : x  Auto Basophil % : x    PT/INR - ( 20 Sep 2023 12:08 )   PT: 17.1 sec;   INR: 1.52          PTT - ( 20 Sep 2023 12:08 )  PTT:39.9 sec        136  |  104  |  17  ----------------------------<  89  4.3   |  24  |  0.98    Ca    9.2      21 Sep 2023 06:07  Phos  3.9       Mg     2.0         TPro  8.0  /  Alb  4.8  /  TBili  0.4  /  DBili  x   /  AST  25  /  ALT  19  /  AlkPhos  56        Urinalysis Basic - ( 21 Sep 2023 06:07 )    Color: x / Appearance: x / SG: x / pH: x  Gluc: 89 mg/dL / Ketone: x  / Bili: x / Urobili: x   Blood: x / Protein: x / Nitrite: x   Leuk Esterase: x / RBC: x / WBC x   Sq Epi: x / Non Sq Epi: x / Bacteria: x        Other Labs:    Cultures:    Pathology:    Imaging Studies:       Hematology Oncology Consult Note     43 year old female with PMH of multiple positive ani cardiolipin antibodies, CVA (, At [employer], punctuate parietal, suspected embolic etiology), Afib (detecting with ILR last year with 3 hours of Afib started on eliquis 5mg BID; compliant), PFO closure (2022), gastritis and fibroid requiring myomectomy (2022) who presented to  ED after < 1 minute of dysarthria this morning and was speaking with her  normally, when she suddenly started to slur her words as her previous stroke symptoms were dysarthria. MRI showed no acute intracranial hemorrhage or infarct. Hematology is consulted for anticoagu    Patient reports she had a CVA in May 2022, and was given one dose of eliquis prior to being on a flight, and then placed on aspirin/plavix thereafter. She then had a PFO closure in 2022, after which she had atrial fibrillation episode lasting longer than 3h, and was subsequently placed on eliquis 5mg BID. Patient reports she is compliant with her eliquis, and has never missed a dose. Patient also reports she had two miscarriages (one "vanishing twin syndrome), both in the first trimester. She has never had any late trimester abortions. She reports her periods are heavy, and reports a history of fibroids s/p myomectomy and is currently considering embolization. Patient is currently on lovenox for this admission    She also reports that her mother had one miscarriage in early trimester. She reports her grandfather "had a heart attack when young" and grandmother had a stroke. She denies any known history of blood disorders.      Prior labs significant for   2023  APTT 38.9   APTT 50/50 35.9  2 hour incubation 41.3  DRVVT lupus anticoagulant negative  silica clotting LA negative  Anticardiolipin, dRVVT Confirm 54.4  23 - anticardiolipin IgM, Serum - 46.6  23 positive Anticardiolipin IgM, Serum 107.2  22 Positive Anticardiolipin - IgM, 34.0   22 Positive Anticardiolipin - IgM, Serum 45.8  Beta 2 glycoprotein negative23, 22, 22, 5/23/22  LA negative 23, 22, 23      PAST MEDICAL & SURGICAL HISTORY:  Gastritis      Cerebrovascular accident (CVA)      Atrial fibrillation      Knee meniscus pain      S/P myomectomy      S/P patent foramen ovale closure          Allergies:  No Known Allergies      Medications:  enoxaparin Injectable 52 milliGRAM(s) SubCutaneous every 12 hours        Social History:    FAMILY HISTORY:      PHYSICAL EXAM:    T(F): 98.8 (23 @ 14:37), Max: 99.2 (23 @ 09:10)  HR: 61 (23 @ 12:17) (61 - 90)  BP: 92/50 (23 @ 12:17) (89/51 - 115/74)  RR: 24 (23 @ 12:17) (19 - 62)  SpO2: 100% (23 @ 04:40) (100% - 100%)  Wt(kg): --    Daily     Daily Weight in k.8 (20 Sep 2023 16:00)    Gen: well developed, well nourished, comfortable  HEENT: normocephalic/atraumatic, no conjunctival pallor, no scleral icterus, no oral thrush/mucosal bleeding/mucositis  Cardiovascular: RR, nl S1S2, no murmurs/rubs/gallops  Respiratory: clear air entry b/l  Gastrointestinal: BS+, soft, NT/ND, no masses, no splenomegaly, no hepatomegaly, no evidence for ascites  Extremities: no clubbing/cyanosis, no edema, no calf tenderness  Vascular:  DP/PT 2+ b/l  Neurological: CN 2-12 grossly intact, no focal deficits  Skin: no rash on visible skin  Lymph Nodes:  no cervical/supraclavicular LAD, no axillary/groin LAD  Musculoskeletal:  full ROM  Psychiatric:  mood stable            Labs:                          8.9    4.96  )-----------( 274      ( 21 Sep 2023 06:07 )             27.9     CBC Full  -  ( 21 Sep 2023 06:07 )  WBC Count : 4.96 K/uL  RBC Count : 3.65 M/uL  Hemoglobin : 8.9 g/dL  Hematocrit : 27.9 %  Platelet Count - Automated : 274 K/uL  Mean Cell Volume : 76.4 fl  Mean Cell Hemoglobin : 24.4 pg  Mean Cell Hemoglobin Concentration : 31.9 gm/dL  Auto Neutrophil # : x  Auto Lymphocyte # : x  Auto Monocyte # : x  Auto Eosinophil # : x  Auto Basophil # : x  Auto Neutrophil % : x  Auto Lymphocyte % : x  Auto Monocyte % : x  Auto Eosinophil % : x  Auto Basophil % : x    PT/INR - ( 20 Sep 2023 12:08 )   PT: 17.1 sec;   INR: 1.52          PTT - ( 20 Sep 2023 12:08 )  PTT:39.9 sec        136  |  104  |  17  ----------------------------<  89  4.3   |  24  |  0.98    Ca    9.2      21 Sep 2023 06:07  Phos  3.9       Mg     2.0         TPro  8.0  /  Alb  4.8  /  TBili  0.4  /  DBili  x   /  AST  25  /  ALT  19  /  AlkPhos  56        Urinalysis Basic - ( 21 Sep 2023 06:07 )    Color: x / Appearance: x / SG: x / pH: x  Gluc: 89 mg/dL / Ketone: x  / Bili: x / Urobili: x   Blood: x / Protein: x / Nitrite: x   Leuk Esterase: x / RBC: x / WBC x   Sq Epi: x / Non Sq Epi: x / Bacteria: x        Other Labs:    Cultures:    Pathology:    Imaging Studies:       Hematology Oncology Consult Note     43 year old female with PMH of multiple positive ani cardiolipin antibodies, CVA (, At [employer], punctuate parietal, suspected embolic etiology), Afib (detecting with ILR last year with 3 hours of Afib started on eliquis 5mg BID; compliant), PFO closure (2022), gastritis and fibroid requiring myomectomy (2022) who presented to  ED after < 1 minute of dysarthria this morning and was speaking with her  normally, when she suddenly started to slur her words as her previous stroke symptoms were dysarthria. MRI showed no acute intracranial hemorrhage or infarct. Hematology is consulted for anticoagulation.     Patient reports she had a CVA in May 2022, and was given one dose of eliquis prior to being on a flight, and then placed on aspirin/plavix thereafter. She then had a PFO closure in 2022, after which she had atrial fibrillation episode lasting longer than 3h, and was subsequently placed on eliquis 5mg BID. Patient reports she is compliant with her eliquis, and has never missed a dose. Patient also reports she had two miscarriages (one "vanishing twin syndrome), both in the first trimester. She has never had any late trimester abortions. She reports her periods are heavy, and reports a history of fibroids s/p myomectomy and is currently considering embolization. Patient is currently on lovenox for this admission    She also reports that her mother had one miscarriage in early trimester. She reports her grandfather "had a heart attack when young" and grandmother had a stroke. She denies any known history of blood disorders.      Prior labs significant for   2023  APTT 38.9   APTT 50/50 35.9  2 hour incubation 41.3  DRVVT lupus anticoagulant negative  silica clotting LA negative  Anticardiolipin, dRVVT Confirm 54.4  23 - anticardiolipin IgM, Serum - 46.6  23 positive Anticardiolipin IgM, Serum 107.2  22 Positive Anticardiolipin - IgM, 34.0   22 Positive Anticardiolipin - IgM, Serum 45.8  Beta 2 glycoprotein negative23, 22, 22, 22  LA negative 23, 22, 23      PAST MEDICAL & SURGICAL HISTORY:  Gastritis      Cerebrovascular accident (CVA)      Atrial fibrillation      Knee meniscus pain      S/P myomectomy      S/P patent foramen ovale closure          Allergies:  No Known Allergies      Medications:  enoxaparin Injectable 52 milliGRAM(s) SubCutaneous every 12 hours        Social History:    FAMILY HISTORY:      PHYSICAL EXAM:    T(F): 98.8 (23 @ 14:37), Max: 99.2 (23 @ 09:10)  HR: 61 (23 @ 12:17) (61 - 90)  BP: 92/50 (23 @ 12:17) (89/51 - 115/74)  RR: 24 (23 @ 12:17) (19 - 62)  SpO2: 100% (23 @ 04:40) (100% - 100%)  Wt(kg): --    Daily     Daily Weight in k.8 (20 Sep 2023 16:00)    Gen: well developed, well nourished, comfortable  HEENT: normocephalic/atraumatic, no conjunctival pallor, no scleral icterus, no oral thrush/mucosal bleeding/mucositis  Cardiovascular: RR, nl S1S2, no murmurs/rubs/gallops  Respiratory: clear air entry b/l  Gastrointestinal: BS+, soft, NT/ND, no masses, no splenomegaly, no hepatomegaly, no evidence for ascites  Extremities: no clubbing/cyanosis, no edema, no calf tenderness  Vascular:  DP/PT 2+ b/l  Neurological: CN 2-12 grossly intact, no focal deficits  Skin: no rash on visible skin  Lymph Nodes:  no cervical/supraclavicular LAD, no axillary/groin LAD  Musculoskeletal:  full ROM  Psychiatric:  mood stable            Labs:                          8.9    4.96  )-----------( 274      ( 21 Sep 2023 06:07 )             27.9     CBC Full  -  ( 21 Sep 2023 06:07 )  WBC Count : 4.96 K/uL  RBC Count : 3.65 M/uL  Hemoglobin : 8.9 g/dL  Hematocrit : 27.9 %  Platelet Count - Automated : 274 K/uL  Mean Cell Volume : 76.4 fl  Mean Cell Hemoglobin : 24.4 pg  Mean Cell Hemoglobin Concentration : 31.9 gm/dL  Auto Neutrophil # : x  Auto Lymphocyte # : x  Auto Monocyte # : x  Auto Eosinophil # : x  Auto Basophil # : x  Auto Neutrophil % : x  Auto Lymphocyte % : x  Auto Monocyte % : x  Auto Eosinophil % : x  Auto Basophil % : x    PT/INR - ( 20 Sep 2023 12:08 )   PT: 17.1 sec;   INR: 1.52          PTT - ( 20 Sep 2023 12:08 )  PTT:39.9 sec        136  |  104  |  17  ----------------------------<  89  4.3   |  24  |  0.98    Ca    9.2      21 Sep 2023 06:07  Phos  3.9       Mg     2.0         TPro  8.0  /  Alb  4.8  /  TBili  0.4  /  DBili  x   /  AST  25  /  ALT  19  /  AlkPhos  56        Urinalysis Basic - ( 21 Sep 2023 06:07 )    Color: x / Appearance: x / SG: x / pH: x  Gluc: 89 mg/dL / Ketone: x  / Bili: x / Urobili: x   Blood: x / Protein: x / Nitrite: x   Leuk Esterase: x / RBC: x / WBC x   Sq Epi: x / Non Sq Epi: x / Bacteria: x        Other Labs:    Cultures:    Pathology:    Imaging Studies:       Hematology Oncology Consult Note     43 year old female with PMH of multiple positive ani cardiolipin antibodies, CVA (, At [employer], punctuate parietal, suspected embolic etiology), Afib (detecting with ILR last year with 3 hours of Afib started on eliquis 5mg BID; compliant), PFO closure (2022), gastritis and fibroid requiring myomectomy (2022) who presented to  ED after < 1 minute of dysarthria this morning and was speaking with her  normally, when she suddenly started to slur her words as her previous stroke symptoms were dysarthria. MRI showed no acute intracranial hemorrhage or infarct. Hematology is consulted for anticoagulation.     Patient reports she had a CVA in May 2022 (also noted to have COVID at that time), and was given one dose of eliquis prior to being on a flight, and then placed on aspirin/plavix thereafter. She then had a PFO closure in 2022, after which she had atrial fibrillation episode lasting longer than 3h, and was subsequently placed on eliquis 5mg BID. Patient reports she is compliant with her eliquis, and has never missed a dose. Patient also reports she had two miscarriages (one "vanishing twin syndrome), both in the first trimester. She has never had any late trimester abortions. She reports her periods are heavy, and reports a history of fibroids s/p myomectomy and is currently considering embolization. Patient is currently on lovenox for this admission    She also reports that her mother had one miscarriage in early trimester. She reports her grandfather "had a heart attack when young" and grandmother had a stroke. She denies any known history of blood disorders.      Prior labs significant for   2023  APTT 38.9   APTT 50/50 35.9  2 hour incubation 41.3  DRVVT lupus anticoagulant negative  silica clotting LA negative  Anticardiolipin, dRVVT Confirm 54.4  23 - anticardiolipin IgM, Serum - 46.6  22  positive Anticardiolipin IgM, Serum 107.2  22 Positive Anticardiolipin - IgM, 34.0   22 Positive Anticardiolipin - IgM, Serum 45.8  Beta 2 glycoprotein negative23, 22, 22, 22  LA negative 23, 22, 23      PAST MEDICAL & SURGICAL HISTORY:  Gastritis      Cerebrovascular accident (CVA)      Atrial fibrillation      Knee meniscus pain      S/P myomectomy      S/P patent foramen ovale closure          Allergies:  No Known Allergies      Medications:  enoxaparin Injectable 52 milliGRAM(s) SubCutaneous every 12 hours        Social History:    FAMILY HISTORY:      PHYSICAL EXAM:    T(F): 98.8 (23 @ 14:37), Max: 99.2 (23 @ 09:10)  HR: 61 (23 @ 12:17) (61 - 90)  BP: 92/50 (23 @ 12:17) (89/51 - 115/74)  RR: 24 (23 @ 12:17) (19 - 62)  SpO2: 100% (23 @ 04:40) (100% - 100%)  Wt(kg): --    Daily     Daily Weight in k.8 (20 Sep 2023 16:00)    Gen: well developed, well nourished, comfortable  HEENT: normocephalic/atraumatic, no conjunctival pallor, no scleral icterus, no oral thrush/mucosal bleeding/mucositis  Cardiovascular: RR, nl S1S2, no murmurs/rubs/gallops  Respiratory: clear air entry b/l  Gastrointestinal: BS+, soft, NT/ND, no masses, no splenomegaly, no hepatomegaly, no evidence for ascites  Extremities: no clubbing/cyanosis, no edema, no calf tenderness  Vascular:  DP/PT 2+ b/l  Neurological: CN 2-12 grossly intact, no focal deficits  Skin: no rash on visible skin  Lymph Nodes:  no cervical/supraclavicular LAD, no axillary/groin LAD  Musculoskeletal:  full ROM  Psychiatric:  mood stable            Labs:                          8.9    4.96  )-----------( 274      ( 21 Sep 2023 06:07 )             27.9     CBC Full  -  ( 21 Sep 2023 06:07 )  WBC Count : 4.96 K/uL  RBC Count : 3.65 M/uL  Hemoglobin : 8.9 g/dL  Hematocrit : 27.9 %  Platelet Count - Automated : 274 K/uL  Mean Cell Volume : 76.4 fl  Mean Cell Hemoglobin : 24.4 pg  Mean Cell Hemoglobin Concentration : 31.9 gm/dL  Auto Neutrophil # : x  Auto Lymphocyte # : x  Auto Monocyte # : x  Auto Eosinophil # : x  Auto Basophil # : x  Auto Neutrophil % : x  Auto Lymphocyte % : x  Auto Monocyte % : x  Auto Eosinophil % : x  Auto Basophil % : x    PT/INR - ( 20 Sep 2023 12:08 )   PT: 17.1 sec;   INR: 1.52          PTT - ( 20 Sep 2023 12:08 )  PTT:39.9 sec        136  |  104  |  17  ----------------------------<  89  4.3   |  24  |  0.98    Ca    9.2      21 Sep 2023 06:07  Phos  3.9       Mg     2.0         TPro  8.0  /  Alb  4.8  /  TBili  0.4  /  DBili  x   /  AST  25  /  ALT  19  /  AlkPhos  56        Urinalysis Basic - ( 21 Sep 2023 06:07 )    Color: x / Appearance: x / SG: x / pH: x  Gluc: 89 mg/dL / Ketone: x  / Bili: x / Urobili: x   Blood: x / Protein: x / Nitrite: x   Leuk Esterase: x / RBC: x / WBC x   Sq Epi: x / Non Sq Epi: x / Bacteria: x        Other Labs:    Cultures:    Pathology:    Imaging Studies:

## 2023-09-21 NOTE — CONSULT NOTE ADULT - SUBJECTIVE AND OBJECTIVE BOX
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 Pt is seen outpatient by Dr. Oshea and called vascular neurology outpatient NP Lynda who directed pt to come to the ED.  LKW: 6:20am Pt states she woke up at 6:20 and was speaking with her  normally and began to make her josef lunch with  while he made coffee, as they were talking about the news her speech was slurred during a single word and she tired to say it correctly and could not, then she tired again and her speech was normal. Both her and her  were alarmed enough to call her outpatient neurology team to discuss as her first stroke symptoms were decreased face sensation and dysarthria that lasted 2 hours. Pt is compliant with her Eliquis and has never missed a dose. 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 She sought out a rheumatologist at [facility] who does research on APS  ( Dr. Hickman [phone]) who she states has given her the okay to continue her Eliquis even formal diagnosis of APS is made. Pt denies any visual, sensory, or motor deficits during this episode or any other episodes since May 2022. She states she asked her  to look at her right after and see if her face from asymmetric. Pt cardiology follow-up (General, EPS and Structural) are all at [facility].          (20 Sep 2023 14:43)    Heme - Dr. Hickman [phone]  43M w possible APLAS, CVA (5/2023 suspect embolic etiology in R punctate parietal), AFib on eliquis, s/p PFO closure, Gastritis, HLD p/w dysarthria <1min, admitted for stroke evaluation.    #Dysarthria  #Prior CVA hx  A1C 5.5. LDL 56. 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  #GERD - on PPI 40    PAST MEDICAL & SURGICAL HISTORY:  Gastritis      Cerebrovascular accident (CVA)      Atrial fibrillation      Knee meniscus pain      S/P myomectomy      S/P patent foramen ovale closure        Home Meds: Home Medications:  atorvastatin 80 mg oral tablet: 1 tab(s) orally once a day (at bedtime) (24 Nov 2022 17:55)  Eliquis 5 mg oral tablet: 5 milligram(s) orally 2 times a day (20 Sep 2023 15:00)  Lipitor 20 mg oral tablet: 1 orally (20 Sep 2023 15:01)  omeprazole 40 mg oral delayed release capsule: 1 orally 2 times a day (20 Sep 2023 15:00)    Allergies: Allergies    No Known Allergies    Intolerances      Soc:   Advanced Directives: Presumed Full Code     CURRENT MEDICATIONS:   --------------------------------------------------------------------------------------  Neurologic Medications    Respiratory Medications    Cardiovascular Medications    Gastrointestinal Medications  pantoprazole    Tablet 40 milliGRAM(s) Oral two times a day    Genitourinary Medications    Hematologic/Oncologic Medications  enoxaparin Injectable 52 milliGRAM(s) SubCutaneous every 12 hours  influenza   Vaccine 0.5 milliLiter(s) IntraMuscular once    Antimicrobial/Immunologic Medications    Endocrine/Metabolic Medications  atorvastatin 80 milliGRAM(s) Oral at bedtime    Topical/Other Medications    --------------------------------------------------------------------------------------    VITAL SIGNS, INS/OUTS (last 24 hours):  --------------------------------------------------------------------------------------  ICU Vital Signs Last 24 Hrs  T(C): 37.1 (21 Sep 2023 05:06), Max: 37.2 (20 Sep 2023 17:35)  T(F): 98.8 (21 Sep 2023 05:06), Max: 99 (20 Sep 2023 17:35)  HR: 61 (21 Sep 2023 04:40) (61 - 82)  BP: 99/50 (21 Sep 2023 04:40) (92/55 - 118/67)  BP(mean): 72 (21 Sep 2023 04:40) (68 - 89)  ABP: --  ABP(mean): --  RR: 62 (21 Sep 2023 04:40) (16 - 62)  SpO2: 100% (21 Sep 2023 04:40) (100% - 100%)    O2 Parameters below as of 21 Sep 2023 04:40  Patient On (Oxygen Delivery Method): room air          I&O's Summary    --------------------------------------------------------------------------------------    EXAM:    LABS  --------------------------------------------------------------------------------------  Labs:  CAPILLARY BLOOD GLUCOSE      POCT Blood Glucose.: 81 mg/dL (20 Sep 2023 11:37)                          8.9    4.96  )-----------( 274      ( 21 Sep 2023 06:07 )             27.9       Auto Neutrophil %: 54.9 % (09-20-23 @ 12:08)  Auto Immature Granulocyte %: 0.2 % (09-20-23 @ 12:08)    09-21    136  |  104  |  17  ----------------------------<  89  4.3   |  24  |  0.98      Calcium: 9.2 mg/dL (09-21-23 @ 06:07)      LFTs:             8.0  | 0.4  | 25       ------------------[56      ( 20 Sep 2023 12:08 )  4.8  | x    | 19          Lipase:x      Amylase:x             Coags:     17.1   ----< 1.52    ( 20 Sep 2023 12:08 )     39.9                Urinalysis Basic - ( 21 Sep 2023 06:07 )    Color: x / Appearance: x / SG: x / pH: x  Gluc: 89 mg/dL / Ketone: x  / Bili: x / Urobili: x   Blood: x / Protein: x / Nitrite: x   Leuk Esterase: x / RBC: x / WBC x   Sq Epi: x / Non Sq Epi: x / Bacteria: x        Urinalysis with Rflx Culture (collected 20 Sep 2023 11:40)        --------------------------------------------------------------------------------------    OTHER LABS    IMAGING RESULTS  ****************   HPI: "43y Female with PMHx of  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 Pt is seen outpatient by Dr. Oshea and called vascular neurology outpatient NP Lynda who directed pt to come to the ED.  LKW: 6:20am Pt states she woke up at 6:20 and was speaking with her  normally and began to make her josef lunch with  while he made coffee, as they were talking about the news her speech was slurred during a single word and she tired to say it correctly and could not, then she tired again and her speech was normal. Both her and her  were alarmed enough to call her outpatient neurology team to discuss as her first stroke symptoms were decreased face sensation and dysarthria that lasted 2 hours. Pt is compliant with her Eliquis and has never missed a dose. 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 She sought out a rheumatologist at [facility] who does research on APS  ( Dr. Hickman [phone]) who she states has given her the okay to continue her Eliquis even formal diagnosis of APS is made. Pt denies any visual, sensory, or motor deficits during this episode or any other episodes since May 2022. She states she asked her  to look at her right after and see if her face from asymmetric. Pt cardiology follow-up (General, EPS and Structural) are all at [facility].          (20 Sep 2023 14:43)    Heme - Dr. Hickman [phone]  43M w possible APLAS, CVA (5/2023 suspect embolic etiology in R punctate parietal), AFib on eliquis, s/p PFO closure, Gastritis, HLD p/w dysarthria <1min, admitted for stroke evaluation.    Pt had positive anticardiolipin ab in setting of COVID previously. Saw Dr. Hickman at [facility] who recommended her to remain on eliquis. On day of presentation had <1min of dysarthria. Pt reports b/l intermittent spasms over her cheeks as well over the last 1wk or so. Denies falls, numbness, weakness. Reports adherence to medications. No fever, chest pain, dypsnea, N/V/Abd pain, dysuria, diarrhea.    ROS: 12 point ROS reviewed and otherwise negative per HPI  FH: No DVT/PE.   SH: Non smoker.     PAST MEDICAL & SURGICAL HISTORY:  Gastritis      Cerebrovascular accident (CVA)      Atrial fibrillation      Knee meniscus pain      S/P myomectomy      S/P patent foramen ovale closure        Home Meds: Home Medications:  atorvastatin 80 mg oral tablet: 1 tab(s) orally once a day (at bedtime) (24 Nov 2022 17:55)  Eliquis 5 mg oral tablet: 5 milligram(s) orally 2 times a day (20 Sep 2023 15:00)  Lipitor 20 mg oral tablet: 1 orally (20 Sep 2023 15:01)  omeprazole 40 mg oral delayed release capsule: 1 orally 2 times a day (20 Sep 2023 15:00)    Allergies: Allergies    No Known Allergies    Intolerances      Soc:   Advanced Directives: Presumed Full Code     CURRENT MEDICATIONS:   --------------------------------------------------------------------------------------  Neurologic Medications    Respiratory Medications    Cardiovascular Medications    Gastrointestinal Medications  pantoprazole    Tablet 40 milliGRAM(s) Oral two times a day    Genitourinary Medications    Hematologic/Oncologic Medications  enoxaparin Injectable 52 milliGRAM(s) SubCutaneous every 12 hours  influenza   Vaccine 0.5 milliLiter(s) IntraMuscular once    Antimicrobial/Immunologic Medications    Endocrine/Metabolic Medications  atorvastatin 80 milliGRAM(s) Oral at bedtime    Topical/Other Medications    --------------------------------------------------------------------------------------    VITAL SIGNS, INS/OUTS (last 24 hours):  --------------------------------------------------------------------------------------  ICU Vital Signs Last 24 Hrs  T(C): 37.1 (21 Sep 2023 05:06), Max: 37.2 (20 Sep 2023 17:35)  T(F): 98.8 (21 Sep 2023 05:06), Max: 99 (20 Sep 2023 17:35)  HR: 61 (21 Sep 2023 04:40) (61 - 82)  BP: 99/50 (21 Sep 2023 04:40) (92/55 - 118/67)  BP(mean): 72 (21 Sep 2023 04:40) (68 - 89)  ABP: --  ABP(mean): --  RR: 62 (21 Sep 2023 04:40) (16 - 62)  SpO2: 100% (21 Sep 2023 04:40) (100% - 100%)    O2 Parameters below as of 21 Sep 2023 04:40  Patient On (Oxygen Delivery Method): room air          I&O's Summary    --------------------------------------------------------------------------------------    EXAM:  GEN: female in NAD on RA  HEENT: NC/AT, MMM  CV: RRR, nml S1S2, no murmurs  PULM: nml effort, CTAB  ABD: Soft, non-distended, NABS, non-tender  NEURO  A/O x3, EOMI, 5/5 in BUE and BLE. Sensation intact.   PSYCH: Appropriate    LABS  --------------------------------------------------------------------------------------  Labs:  CAPILLARY BLOOD GLUCOSE      POCT Blood Glucose.: 81 mg/dL (20 Sep 2023 11:37)                          8.9    4.96  )-----------( 274      ( 21 Sep 2023 06:07 )             27.9       Auto Neutrophil %: 54.9 % (09-20-23 @ 12:08)  Auto Immature Granulocyte %: 0.2 % (09-20-23 @ 12:08)    09-21    136  |  104  |  17  ----------------------------<  89  4.3   |  24  |  0.98      Calcium: 9.2 mg/dL (09-21-23 @ 06:07)      LFTs:             8.0  | 0.4  | 25       ------------------[56      ( 20 Sep 2023 12:08 )  4.8  | x    | 19          Lipase:x      Amylase:x             Coags:     17.1   ----< 1.52    ( 20 Sep 2023 12:08 )     39.9                Urinalysis Basic - ( 21 Sep 2023 06:07 )    Color: x / Appearance: x / SG: x / pH: x  Gluc: 89 mg/dL / Ketone: x  / Bili: x / Urobili: x   Blood: x / Protein: x / Nitrite: x   Leuk Esterase: x / RBC: x / WBC x   Sq Epi: x / Non Sq Epi: x / Bacteria: x        Urinalysis with Rflx Culture (collected 20 Sep 2023 11:40)        --------------------------------------------------------------------------------------    OTHER LABS    IMAGING RESULTS  ****************

## 2023-09-21 NOTE — PHYSICAL THERAPY INITIAL EVALUATION ADULT - FINE MOTOR COORDINATION, LEFT HAND, FINGER TO NOSE, OT EVAL
PROCEDURE  ECG 12 Lead Documentation Only  Date/Time: 1/20/2020 2:10 PM  Performed by: Herbert Hardin MD  Authorized by: Herbert Hardin MD     Indications / Diagnosis:  Chest pain /sob  ECG reviewed by me, the ED Provider: yes    Patient location:  ED and bedside  Previous ECG:     Previous ECG:  Compared to current    Comparison ECG info:  11/18/19    Similarity:  No change    Comparison to cardiac monitor: Yes    Interpretation:     Interpretation: non-specific    Rate:     ECG rate:  73    ECG rate assessment: normal    Ectopy:     Ectopy: none    QRS:     QRS axis:  Normal    QRS intervals:  Normal  Conduction:     Conduction: abnormal      Abnormal conduction: 1st degree    ST segments:     ST segments:  Normal  T waves:     T waves: flattening      Flattening:  III and V1  Q waves:     Q waves:  III  Other findings:     Other findings: U wave    Comments:      No ecg signs of ischemia/ injury / r heaRT STRAIN / Zach Bennett MD  01/20/20 0296 normal performance

## 2023-09-21 NOTE — CONSULT NOTE ADULT - ASSESSMENT
Heme - Dr. Hickman [phone]  43M w possible APLAS, CVA (5/2023 suspect embolic etiology in R punctate parietal), AFib on eliquis, s/p PFO closure, Gastritis, HLD p/w dysarthria <1min, admitted for stroke evaluation.    #Dysarthria  #Prior CVA hx  A1C 5.5. LDL 56. 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  #GERD - on PPI 40    Plan  PT; OT  Incomplete Heme - Dr. Paul; Rheum - Dr. Hickman [phone] @[facility]  43M w possible APLAS, CVA (5/2023 suspect embolic etiology in R punctate parietal), AFib on eliquis, s/p PFO closure, Gastritis, HLD p/w dysarthria <1min, admitted for stroke/TIA evaluation.    #Dysarthria - resolved  #Prior CVA hx  A1C 5.5. LDL 56. TSH 3.33  CTH, CTA, CTP nml per read; TTE w nml LVEF no shunt observed  MR showing no acute infarction per read    #Microcytic anemia - 8.9 from 9.7. Pt w h/o iron deficiency d/t menorrhagia. Follows w outpatient gynecology and last received Iron infusion in 02/2023 - Sees Dr. Paul outpatient.  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 If so, agree w consult to hematology regarding possible APLAS and whether to change AC from eliquis to coumadin. Pt herself prefers not to switch AC if possible.

## 2023-09-21 NOTE — OCCUPATIONAL THERAPY INITIAL EVALUATION ADULT - PERTINENT HX OF CURRENT PROBLEM, REHAB EVAL
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 LKW: 620am. Pt was speaking with  when her speech became slurred for one word and again she tried to repeat it but then was back to normal for the rest of the sentence. Pt is seen outpatient by Dr. Oshea and called vascular neurology outpatient NP Lynda who directed pt to come to the ED. On physical exam, pt with no focal deficits, NIHSS: 0. Pt compliant on Eliquis, never missed a dose. 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 She sought out a rheumatologist at [place] who does research on APS  ( Dr. Hickman [phone]) who she states has given her the okay to continue her Eliquis even formal diagnosis of APS is made. Pt denies any visual, sensory, or motor deficits during this episode or any other episodes since May 2022. She states she asked her  to look at her right after and see if her face from asymmetric. Pt cardiology follow-up (General, EPS and Structural) are all at [place].  HCT: Negative. CTA: negative. Given complete resolution of symptoms that lasted a couple seconds, TNK was not given. Pt admitted to stroke for further workup of TIA.

## 2023-09-21 NOTE — DISCHARGE NOTE NURSING/CASE MANAGEMENT/SOCIAL WORK - PATIENT PORTAL LINK FT
You can access the FollowMyHealth Patient Portal offered by [facility] by registering at the following website: [email or web address]. By joining [facility]’s FollowMyHealth portal, you will also be able to view your health information using other applications (apps) compatible with our system.

## 2023-09-21 NOTE — DISCHARGE NOTE PROVIDER - HOSPITAL COURSE
Hospital course:  43 year old female with a PMH of APLS (+Anti-CL titers), prior ischemic stroke, a fib, s/p PFO closure. She is on Eliquis 5 mg BID, compliant. Admitted for a < 1 minute episode of dysarthria witnessed by her , felt similar to     Discharge NIHSS:     During this hospital course, patient had a (ischemic/hemorrhagic) stroke located in (left/right.....) as seen on (MRI/CT).   The stroke etiology is likely secondary to:  []atrial fibrillation  []small vessel disease from atherosclerotic risk factors  []other:  []etiology workup still in progress    Patient had the following workup done in house:  CT Head:   MR Head Non Contrast:  CT Angio Head:  CT Angio Neck:  []echo  []labs  []other    Physical exam at discharge:    New medications on discharge:  Labs to be followed up:  Imaging to be done as outpatient:  Further outpatient workup:   Hospital course:  43 year old female with a PMH of APLS (+Anti-CL titers), prior ischemic stroke, a fib, s/p PFO closure, fibroids s/p myomectomy, hx menorrhagia (pending possible ablation in 12/2023), who was admitted for a < 1 minute episode of dysarthria witnessed by her , felt similar to when she was diagnosed with her prior stroke. She is on Eliquis 5 mg BID, compliant. Was told that she only needed to be on Eliquis x 6 months as pAF was an adverse effect of PFO closure. Stroke code was called upon arrival to the ED, initial NIHSS of 0. CTH, CTA H/N, and CTP negative. Concern about Eliquis failure i/s/o of possible TIA therefore was switch to therapeutic SQL inpatient. Hypercoags drawn prior to transition. MRI negative for acute ischemia. TTE with patent PFO closure. ILR interrogated patient's ILR, no evidence of AFib. Heme onc consulted given hx of APLS and for possible transition to Coumadin. Recommended _. Given uncertainty of patient's event and resistance to Coumadin transition, will continue Eliquis for now pending remainder of hypercoags labs and have patient follow up with Dr. Oshea. Will hold off on adding ASA to regimen given history of menorrhagia. PT/OT recommending no needs.    During this hospital course, patient did not have a stroke.    Patient had the following workup done in house:  CT Brain Stroke Protocol (09.20.23 @ 11:52)   Impression: No acute intracranial injury.    CT Angio Brain Stroke Protocol  w/ IV Cont (09.20.23 @ 12:13)   IMPRESSION:  Intracranial CTA: No large vessel occlusion or high-grade stenosis  Extracranial CTA: No steno-occlusive disease    CT Brain Perfusion Maps Stroke (09.20.23 @ 12:13)   IMPRESSION: Normal CT perfusion    MR Head No Cont (09.20.23 @ 22:24)   IMPRESSION:  No acute intracranial hemorrhage or acute infarct.  Nonspecific mild white matter signal changes.    Echocardiogram w/ Bubble and Doppler (09.20.23 @ 13:57)   CONCLUSIONS:   1. Normal left ventricular size and systolic function.   2. Normal right ventricular size and systolic function.   3. Normal atria.   4. Atrial septal occluder appears well seated with no significant   interatrial shunt by color flow Doppler.   5. Injection of agitated saline via a peripheral vein reveals no   evidence of a right-to-left shunt.   6. No significant valvular disease.   7. No evidence of pulmonary hypertension.   8. No pericardial effusion.   9. No prior echo is available for comparison.    [x]labs  A1C 5.5  LDL 56  TSH 3.33  Anticardiolipin (+)    Physical exam at discharge:  -Mental status: Awake, alert, oriented to person, place, and time. Speech is fluent with intact naming, repetition, and comprehension, no dysarthria. Recent and remote memory intact. Follows commands. Attention/concentration intact. Fund of knowledge appropriate.  -Cranial nerves:   II: Visual fields are full to confrontation.  III, IV, VI: Extraocular movements are intact without nystagmus. Pupils equally round and reactive to light  V:  Facial sensation V1-V3 equal and intact   VII: Face is symmetric with normal eye closure and smile  XI: Head turning and shoulder shrug are intact.  XII: Tongue protrudes midline  Motor: Normal bulk and tone. No pronator drift. Strength bilateral upper extremity 5/5, bilateral lower extremities 5/5.  Sensation: Intact to light touch bilaterally. No neglect or extinction on double simultaneous testing.  Coordination: No dysmetria on finger-to-nose   Gait: Narrow gait and steady    Discharge NIHSS: 0    New medications on discharge: N/A  Labs to be followed up: hypercoags  Imaging to be done as outpatient: N/A  Further outpatient workup: neurology follow up, GYN follow up for possible ablation   Hospital course:  43 year old female with a PMH of APLS (+Anti-CL titers), prior ischemic stroke, a fib, s/p PFO closure, fibroids s/p myomectomy, hx menorrhagia (pending possible ablation in 12/2023), who was admitted for a < 1 minute episode of dysarthria witnessed by her , felt similar to when she was diagnosed with her prior stroke. She is on Eliquis 5 mg BID, compliant. Was told that she only needed to be on Eliquis x 6 months as pAF was an adverse effect of PFO closure. Stroke code was called upon arrival to the ED, initial NIHSS of 0. CTH, CTA H/N, and CTP negative. Concern about Eliquis failure i/s/o of possible TIA therefore was switch to therapeutic SQL inpatient. Hypercoags drawn prior to transition. MRI negative for acute ischemia. TTE with patent PFO closure. ILR interrogated patient's ILR, no evidence of AFib. Heme onc consulted given hx of APLS and for possible transition to Coumadin. Given uncertainty of patient's event and resistance to Coumadin transition, will continue Eliquis for now pending remainder of hypercoags labs and have patient follow up with Dr. Oshea. Will hold off on adding ASA to regimen given history of menorrhagia. PT/OT recommending no needs.    During this hospital course, patient did not have a stroke.    Patient had the following workup done in house:  CT Brain Stroke Protocol (09.20.23 @ 11:52)   Impression: No acute intracranial injury.    CT Angio Brain Stroke Protocol  w/ IV Cont (09.20.23 @ 12:13)   IMPRESSION:  Intracranial CTA: No large vessel occlusion or high-grade stenosis  Extracranial CTA: No steno-occlusive disease    CT Brain Perfusion Maps Stroke (09.20.23 @ 12:13)   IMPRESSION: Normal CT perfusion    MR Head No Cont (09.20.23 @ 22:24)   IMPRESSION:  No acute intracranial hemorrhage or acute infarct.  Nonspecific mild white matter signal changes.    Echocardiogram w/ Bubble and Doppler (09.20.23 @ 13:57)   CONCLUSIONS:   1. Normal left ventricular size and systolic function.   2. Normal right ventricular size and systolic function.   3. Normal atria.   4. Atrial septal occluder appears well seated with no significant   interatrial shunt by color flow Doppler.   5. Injection of agitated saline via a peripheral vein reveals no   evidence of a right-to-left shunt.   6. No significant valvular disease.   7. No evidence of pulmonary hypertension.   8. No pericardial effusion.   9. No prior echo is available for comparison.    [x]labs  A1C 5.5  LDL 56  TSH 3.33  Anticardiolipin (+)    Physical exam at discharge:  -Mental status: Awake, alert, oriented to person, place, and time. Speech is fluent with intact naming, repetition, and comprehension, no dysarthria. Recent and remote memory intact. Follows commands. Attention/concentration intact. Fund of knowledge appropriate.  -Cranial nerves:   II: Visual fields are full to confrontation.  III, IV, VI: Extraocular movements are intact without nystagmus. Pupils equally round and reactive to light  V:  Facial sensation V1-V3 equal and intact   VII: Face is symmetric with normal eye closure and smile  XI: Head turning and shoulder shrug are intact.  XII: Tongue protrudes midline  Motor: Normal bulk and tone. No pronator drift. Strength bilateral upper extremity 5/5, bilateral lower extremities 5/5.  Sensation: Intact to light touch bilaterally. No neglect or extinction on double simultaneous testing.  Coordination: No dysmetria on finger-to-nose   Gait: Narrow gait and steady    Discharge NIHSS: 0    New medications on discharge: N/A  Labs to be followed up: hypercoags  Imaging to be done as outpatient: N/A  Further outpatient workup: neurology follow up, hematology follow up, GYN follow up for possible ablation   Hospital course:  43 year old female with a PMH of APLS (+Anti-CL titers), prior ischemic stroke, a fib, s/p PFO closure, fibroids s/p myomectomy, hx menorrhagia (pending possible ablation in 12/2023), who was admitted for a < 1 minute episode of dysarthria witnessed by her , felt similar to when she was diagnosed with her prior stroke. She is on Eliquis 5 mg BID, compliant. Was told that she only needed to be on Eliquis x 6 months as pAF was an adverse effect of PFO closure. Stroke code was called upon arrival to the ED, initial NIHSS of 0. CTH, CTA H/N, and CTP negative. Concern about Eliquis failure i/s/o of possible TIA therefore was switch to therapeutic SQL inpatient. Hypercoags drawn prior to transition. MRI negative for acute ischemia. TTE with patent PFO closure. ILR interrogated patient's ILR, no evidence of AFib. Heme onc consulted given hx of APLS and for possible transition to Coumadin. Given uncertainty of patient's event, negative MRI, and history of menorrhagia will continue Eliquis for now pending remainder of hypercoags labs and have patient follow up with Dr. Oshea. Hematology in agreement. Will hold off on adding ASA for now. PT/OT recommending no needs.    During this hospital course, patient did not have a stroke.    Patient had the following workup done in house:  CT Brain Stroke Protocol (09.20.23 @ 11:52)   Impression: No acute intracranial injury.    CT Angio Brain Stroke Protocol  w/ IV Cont (09.20.23 @ 12:13)   IMPRESSION:  Intracranial CTA: No large vessel occlusion or high-grade stenosis  Extracranial CTA: No steno-occlusive disease    CT Brain Perfusion Maps Stroke (09.20.23 @ 12:13)   IMPRESSION: Normal CT perfusion    MR Head No Cont (09.20.23 @ 22:24)   IMPRESSION:  No acute intracranial hemorrhage or acute infarct.  Nonspecific mild white matter signal changes.    Echocardiogram w/ Bubble and Doppler (09.20.23 @ 13:57)   CONCLUSIONS:   1. Normal left ventricular size and systolic function.   2. Normal right ventricular size and systolic function.   3. Normal atria.   4. Atrial septal occluder appears well seated with no significant   interatrial shunt by color flow Doppler.   5. Injection of agitated saline via a peripheral vein reveals no   evidence of a right-to-left shunt.   6. No significant valvular disease.   7. No evidence of pulmonary hypertension.   8. No pericardial effusion.   9. No prior echo is available for comparison.    [x]labs  A1C 5.5  LDL 56  TSH 3.33  Anticardiolipin (+)    Physical exam at discharge:  -Mental status: Awake, alert, oriented to person, place, and time. Speech is fluent with intact naming, repetition, and comprehension, no dysarthria. Recent and remote memory intact. Follows commands. Attention/concentration intact. Fund of knowledge appropriate.  -Cranial nerves:   II: Visual fields are full to confrontation.  III, IV, VI: Extraocular movements are intact without nystagmus. Pupils equally round and reactive to light  V:  Facial sensation V1-V3 equal and intact   VII: Face is symmetric with normal eye closure and smile  XI: Head turning and shoulder shrug are intact.  XII: Tongue protrudes midline  Motor: Normal bulk and tone. No pronator drift. Strength bilateral upper extremity 5/5, bilateral lower extremities 5/5.  Sensation: Intact to light touch bilaterally. No neglect or extinction on double simultaneous testing.  Coordination: No dysmetria on finger-to-nose   Gait: Narrow gait and steady    Discharge NIHSS: 0    New medications on discharge: N/A  Labs to be followed up: hypercoags  Imaging to be done as outpatient: N/A  Further outpatient workup: neurology follow up, hematology follow up, GYN follow up for possible ablation

## 2023-09-21 NOTE — PHYSICAL THERAPY INITIAL EVALUATION ADULT - STANDING BALANCE: STATIC
-labs  -continue miralaxjose  -start antibiotics  -er if condition decline  -see me in 6-8 weeks good balance

## 2023-09-21 NOTE — DISCHARGE NOTE PROVIDER - NSDCCPCAREPLAN_GEN_ALL_CORE_FT
PRINCIPAL DISCHARGE DIAGNOSIS  Diagnosis: TIA (transient ischemic attack)  Assessment and Plan of Treatment: You were admitted to the hospital because you had symptoms of dysarthria, which resolved. This is called a transient ischemic attack, or TIA. This is when a blood clot temporarily blocks a blood vessel in your brain, but does not last long enough to cause permanent damage in your brain. A TIA is a warning sign of a future stroke, which can permanently damage areas in the brain that control parts of the body. It is important to treat a TIA to prevent strokes.   You have these risks factors of TIA and future strokes. Please see secondary diagnoses for further explanation: (DC summary authour, delete below as needed)  -atrial fibrillation  -antiphospholipid syndrome   -prior stroke  -history of PFO  Please take your Eliquis for blood thinning and Atorvastatin for cholesterol medication/blood vessel protection as prescribed to prevent further strokes. Do not skip doses and do not run low on your medication. If you run low on your medication, please contact your doctor.  You will follow up outpatient with the stroke Nurse Practitioner/doctor as scheduled below.  Call 911 if you or someone you know experiences the following symptoms of stroke (can be remembered by BE FAST):  •Balance: Dizziness, loss of balance, or a sense of falling  •Eyes: Sudden double vision or blurred vision  •Face: drooping of one side of the face  •Arm: arm weakness  •Speech:  Sudden trouble talking or slurred speech, trouble understanding others  •Time: Time to call for an ambulance fast!       PRINCIPAL DISCHARGE DIAGNOSIS  Diagnosis: TIA (transient ischemic attack)  Assessment and Plan of Treatment: You were admitted to the hospital because you had symptoms of dysarthria, which resolved. Your MRI was negative for any acute stroke and your loop recorder was interrogated which showed no evidence of atrial fibrillation. Given the duration of your symptoms and negative MRI, unclear of the exact etiology however it is a possibility that you had a questionable TIA. This is when a blood clot temporarily blocks a blood vessel in your brain, but does not last long enough to cause permanent damage in your brain. A TIA is a warning sign of a future stroke, which can permanently damage areas in the brain that control parts of the body  You have these risks factors of TIA and future strokes. Please see secondary diagnoses for further explanation:   -atrial fibrillation  -antiphospholipid syndrome  -history of PFO   -history of prior stroke  Please take your Eliquis for blood thinning and Atorvastatin for cholesterol medication/blood vessel protection as prescribed to prevent further strokes. Do not skip doses and do not run low on your medication. If you run low on your medication, please contact your doctor.  You will follow up outpatient with the stroke Nurse Practitioner/doctor as scheduled below.  Call 911 if you or someone you know experiences the following symptoms of stroke (can be remembered by BE FAST):  •Balance: Dizziness, loss of balance, or a sense of falling  •Eyes: Sudden double vision or blurred vision  •Face: drooping of one side of the face  •Arm: arm weakness  •Speech:  Sudden trouble talking or slurred speech, trouble understanding others  •Time: Time to call for an ambulance fast!

## 2023-09-21 NOTE — PROGRESS NOTE ADULT - SUBJECTIVE AND OBJECTIVE BOX
EPS Device interrogation    Indication: TIA    Device model: 	THALIA Linq    Functioning Mode:  n/a 	    Underlying Rhythm: VS    Pacemaker dependency:  No    Battery status: Good   Interrogating parameters:   				RA			RV			LV    Sense:                                                                             0.96   mV    Threshold:                                                                    n/a     Pacing Impedance:                                                           n/a     Shock Impedance:                                                          n/a     Events/Alert:  none    Parameter change: 	none    Normal function ILR    [ ]EPS attending: Interrogation reviewed. Agree with above.

## 2023-09-22 LAB
CONFIRM APTT STACLOT: NEGATIVE — SIGNIFICANT CHANGE UP
DRVVT RATIO: 0.49 RATIO — SIGNIFICANT CHANGE UP (ref 0–1.03)
DRVVT SCREEN TO CONFIRM RATIO: SIGNIFICANT CHANGE UP

## 2023-09-26 DIAGNOSIS — Z86.73 PERSONAL HISTORY OF TRANSIENT ISCHEMIC ATTACK (TIA), AND CEREBRAL INFARCTION WITHOUT RESIDUAL DEFICITS: ICD-10-CM

## 2023-09-26 DIAGNOSIS — Z79.01 LONG TERM (CURRENT) USE OF ANTICOAGULANTS: ICD-10-CM

## 2023-09-26 DIAGNOSIS — Z86.79 PERSONAL HISTORY OF OTHER DISEASES OF THE CIRCULATORY SYSTEM: ICD-10-CM

## 2023-09-26 DIAGNOSIS — E78.5 HYPERLIPIDEMIA, UNSPECIFIED: ICD-10-CM

## 2023-09-26 DIAGNOSIS — K29.70 GASTRITIS, UNSPECIFIED, WITHOUT BLEEDING: ICD-10-CM

## 2023-09-26 DIAGNOSIS — Z79.82 LONG TERM (CURRENT) USE OF ASPIRIN: ICD-10-CM

## 2023-09-26 DIAGNOSIS — Z86.16 PERSONAL HISTORY OF COVID-19: ICD-10-CM

## 2023-09-26 DIAGNOSIS — R47.1 DYSARTHRIA AND ANARTHRIA: ICD-10-CM

## 2023-09-26 DIAGNOSIS — D68.61 ANTIPHOSPHOLIPID SYNDROME: ICD-10-CM

## 2023-09-26 DIAGNOSIS — I48.0 PAROXYSMAL ATRIAL FIBRILLATION: ICD-10-CM

## 2023-09-26 DIAGNOSIS — Z79.02 LONG TERM (CURRENT) USE OF ANTITHROMBOTICS/ANTIPLATELETS: ICD-10-CM

## 2023-09-26 DIAGNOSIS — G45.9 TRANSIENT CEREBRAL ISCHEMIC ATTACK, UNSPECIFIED: ICD-10-CM

## 2023-09-26 DIAGNOSIS — I10 ESSENTIAL (PRIMARY) HYPERTENSION: ICD-10-CM

## 2023-09-26 DIAGNOSIS — Z79.899 OTHER LONG TERM (CURRENT) DRUG THERAPY: ICD-10-CM

## 2023-09-26 DIAGNOSIS — K21.9 GASTRO-ESOPHAGEAL REFLUX DISEASE WITHOUT ESOPHAGITIS: ICD-10-CM

## 2023-09-26 DIAGNOSIS — D50.9 IRON DEFICIENCY ANEMIA, UNSPECIFIED: ICD-10-CM

## 2023-09-26 LAB
ANA SER IF-ACNC: NEGATIVE
APO LP(A) SERPL-MCNC: 10.7 NMOL/L
B2 GLYCOPROT1 AB SER QL: NEGATIVE
B2 GLYCOPROT1 IGA SERPL IA-ACNC: <5 SAU
B2 GLYCOPROT1 IGG SER-ACNC: <5 SGU
B2 GLYCOPROT1 IGM SER-ACNC: 6.2 SMU
CARDIOLIPIN AB SER IA-ACNC: POSITIVE
CARDIOLIPIN IGM SER-MCNC: 46.6 MPL
CARDIOLIPIN IGM SER-MCNC: <5 GPL
CONFIRM: 54.4 SEC
DEPRECATED CARDIOLIPIN IGA SER: <5 APL
DRVVT IMM 1:2 NP PPP: NORMAL
DRVVT SCREEN TO CONFIRM RATIO: 0.68 RATIO
SCREEN DRVVT: 44.3 SEC

## 2023-10-17 ENCOUNTER — RX RENEWAL (OUTPATIENT)
Age: 43
End: 2023-10-17

## 2023-10-26 PROBLEM — I48.91 UNSPECIFIED ATRIAL FIBRILLATION: Chronic | Status: ACTIVE | Noted: 2023-09-20

## 2023-11-17 ENCOUNTER — APPOINTMENT (OUTPATIENT)
Dept: NEUROLOGY | Facility: CLINIC | Age: 43
End: 2023-11-17
Payer: COMMERCIAL

## 2023-11-17 VITALS
SYSTOLIC BLOOD PRESSURE: 97 MMHG | BODY MASS INDEX: 21.16 KG/M2 | WEIGHT: 115 LBS | DIASTOLIC BLOOD PRESSURE: 63 MMHG | HEART RATE: 69 BPM | HEIGHT: 62 IN | OXYGEN SATURATION: 100 % | TEMPERATURE: 98.1 F

## 2023-11-17 DIAGNOSIS — I63.9 CEREBRAL INFARCTION, UNSPECIFIED: ICD-10-CM

## 2023-11-17 DIAGNOSIS — R76.0 RAISED ANTIBODY TITER: ICD-10-CM

## 2023-11-17 PROCEDURE — 99215 OFFICE O/P EST HI 40 MIN: CPT

## 2023-12-19 ENCOUNTER — APPOINTMENT (OUTPATIENT)
Dept: NEUROLOGY | Facility: CLINIC | Age: 43
End: 2023-12-19

## 2024-02-15 DIAGNOSIS — H93.A9 PULSATILE TINNITUS, UNSPECIFIED EAR: ICD-10-CM

## 2024-02-22 ENCOUNTER — OUTPATIENT (OUTPATIENT)
Dept: OUTPATIENT SERVICES | Facility: HOSPITAL | Age: 44
LOS: 1 days | End: 2024-02-22
Payer: COMMERCIAL

## 2024-02-22 ENCOUNTER — APPOINTMENT (OUTPATIENT)
Dept: MRI IMAGING | Facility: HOSPITAL | Age: 44
End: 2024-02-22

## 2024-02-22 DIAGNOSIS — M25.569 PAIN IN UNSPECIFIED KNEE: Chronic | ICD-10-CM

## 2024-02-22 DIAGNOSIS — Z87.74 PERSONAL HISTORY OF (CORRECTED) CONGENITAL MALFORMATIONS OF HEART AND CIRCULATORY SYSTEM: Chronic | ICD-10-CM

## 2024-02-22 DIAGNOSIS — Z98.890 OTHER SPECIFIED POSTPROCEDURAL STATES: Chronic | ICD-10-CM

## 2024-02-22 DIAGNOSIS — H93.A9 PULSATILE TINNITUS, UNSPECIFIED EAR: ICD-10-CM

## 2024-02-22 PROCEDURE — 70544 MR ANGIOGRAPHY HEAD W/O DYE: CPT

## 2024-02-22 PROCEDURE — 70551 MRI BRAIN STEM W/O DYE: CPT

## 2024-02-22 PROCEDURE — 70551 MRI BRAIN STEM W/O DYE: CPT | Mod: 26

## 2024-02-22 PROCEDURE — 70544 MR ANGIOGRAPHY HEAD W/O DYE: CPT | Mod: 26,59

## 2024-03-04 ENCOUNTER — NON-APPOINTMENT (OUTPATIENT)
Age: 44
End: 2024-03-04

## 2024-03-05 ENCOUNTER — TRANSCRIPTION ENCOUNTER (OUTPATIENT)
Age: 44
End: 2024-03-05

## 2024-03-07 ENCOUNTER — APPOINTMENT (OUTPATIENT)
Dept: HEMATOLOGY ONCOLOGY | Facility: CLINIC | Age: 44
End: 2024-03-07
Payer: COMMERCIAL

## 2024-03-07 ENCOUNTER — LABORATORY RESULT (OUTPATIENT)
Age: 44
End: 2024-03-07

## 2024-03-07 ENCOUNTER — APPOINTMENT (OUTPATIENT)
Age: 44
End: 2024-03-07

## 2024-03-07 LAB — FERRITIN SERPL-MCNC: 4 NG/ML

## 2024-03-07 PROCEDURE — 99215 OFFICE O/P EST HI 40 MIN: CPT

## 2024-03-07 PROCEDURE — G2211 COMPLEX E/M VISIT ADD ON: CPT

## 2024-03-07 PROCEDURE — 99205 OFFICE O/P NEW HI 60 MIN: CPT

## 2024-03-08 ENCOUNTER — INPATIENT (INPATIENT)
Facility: HOSPITAL | Age: 44
LOS: 0 days | Discharge: HOME | DRG: 812 | End: 2024-03-09
Attending: HOSPITALIST | Admitting: HOSPITALIST
Payer: COMMERCIAL

## 2024-03-08 VITALS
SYSTOLIC BLOOD PRESSURE: 129 MMHG | WEIGHT: 117.07 LBS | TEMPERATURE: 98 F | OXYGEN SATURATION: 100 % | DIASTOLIC BLOOD PRESSURE: 82 MMHG | HEART RATE: 97 BPM | RESPIRATION RATE: 22 BRPM

## 2024-03-08 DIAGNOSIS — D64.9 ANEMIA, UNSPECIFIED: ICD-10-CM

## 2024-03-08 DIAGNOSIS — K29.70 GASTRITIS, UNSPECIFIED, WITHOUT BLEEDING: ICD-10-CM

## 2024-03-08 DIAGNOSIS — Z98.890 OTHER SPECIFIED POSTPROCEDURAL STATES: Chronic | ICD-10-CM

## 2024-03-08 DIAGNOSIS — D62 ACUTE POSTHEMORRHAGIC ANEMIA: ICD-10-CM

## 2024-03-08 DIAGNOSIS — Z29.9 ENCOUNTER FOR PROPHYLACTIC MEASURES, UNSPECIFIED: ICD-10-CM

## 2024-03-08 DIAGNOSIS — Z87.74 PERSONAL HISTORY OF (CORRECTED) CONGENITAL MALFORMATIONS OF HEART AND CIRCULATORY SYSTEM: Chronic | ICD-10-CM

## 2024-03-08 DIAGNOSIS — Z86.73 PERSONAL HISTORY OF TRANSIENT ISCHEMIC ATTACK (TIA), AND CEREBRAL INFARCTION WITHOUT RESIDUAL DEFICITS: ICD-10-CM

## 2024-03-08 DIAGNOSIS — M25.569 PAIN IN UNSPECIFIED KNEE: Chronic | ICD-10-CM

## 2024-03-08 LAB
ANION GAP SERPL CALC-SCNC: 6 MMOL/L — SIGNIFICANT CHANGE UP (ref 5–17)
APPEARANCE UR: ABNORMAL
APTT BLD: 38.9 SEC — HIGH (ref 24.5–35.6)
BACTERIA # UR AUTO: NEGATIVE /HPF — SIGNIFICANT CHANGE UP
BASOPHILS # BLD AUTO: 0.05 K/UL — SIGNIFICANT CHANGE UP (ref 0–0.2)
BASOPHILS NFR BLD AUTO: 0.9 % — SIGNIFICANT CHANGE UP (ref 0–2)
BILIRUB UR-MCNC: NEGATIVE — SIGNIFICANT CHANGE UP
BUN SERPL-MCNC: 18 MG/DL — SIGNIFICANT CHANGE UP (ref 7–23)
CALCIUM SERPL-MCNC: 9.7 MG/DL — SIGNIFICANT CHANGE UP (ref 8.4–10.5)
CHLORIDE SERPL-SCNC: 105 MMOL/L — SIGNIFICANT CHANGE UP (ref 96–108)
CO2 SERPL-SCNC: 26 MMOL/L — SIGNIFICANT CHANGE UP (ref 22–31)
COLOR SPEC: ABNORMAL
CREAT SERPL-MCNC: 0.87 MG/DL — SIGNIFICANT CHANGE UP (ref 0.5–1.3)
DIFF PNL FLD: ABNORMAL
EGFR: 84 ML/MIN/1.73M2 — SIGNIFICANT CHANGE UP
EOSINOPHIL # BLD AUTO: 0.32 K/UL — SIGNIFICANT CHANGE UP (ref 0–0.5)
EOSINOPHIL NFR BLD AUTO: 6 % — SIGNIFICANT CHANGE UP (ref 0–6)
FOLATE SERPL-MCNC: 6.4 NG/ML
GLUCOSE SERPL-MCNC: 96 MG/DL — SIGNIFICANT CHANGE UP (ref 70–99)
GLUCOSE UR QL: NEGATIVE MG/DL — SIGNIFICANT CHANGE UP
HCT VFR BLD CALC: 23.3 % — LOW (ref 34.5–45)
HCT VFR BLD CALC: 26.2 % — LOW (ref 34.5–45)
HGB BLD-MCNC: 6.4 G/DL — CRITICAL LOW (ref 11.5–15.5)
HGB BLD-MCNC: 7.7 G/DL — LOW (ref 11.5–15.5)
IMM GRANULOCYTES NFR BLD AUTO: 0.4 % — SIGNIFICANT CHANGE UP (ref 0–0.9)
INR BLD: 1.55 — HIGH (ref 0.85–1.18)
IRON SATN MFR SERPL: 3 %
IRON SERPL-MCNC: 12 UG/DL
KETONES UR-MCNC: NEGATIVE MG/DL — SIGNIFICANT CHANGE UP
LEUKOCYTE ESTERASE UR-ACNC: ABNORMAL
LYMPHOCYTES # BLD AUTO: 1.56 K/UL — SIGNIFICANT CHANGE UP (ref 1–3.3)
LYMPHOCYTES # BLD AUTO: 29.5 % — SIGNIFICANT CHANGE UP (ref 13–44)
MCHC RBC-ENTMCNC: 16.7 PG — LOW (ref 27–34)
MCHC RBC-ENTMCNC: 19.6 PG — LOW (ref 27–34)
MCHC RBC-ENTMCNC: 27.5 GM/DL — LOW (ref 32–36)
MCHC RBC-ENTMCNC: 29.4 GM/DL — LOW (ref 32–36)
MCV RBC AUTO: 60.7 FL — LOW (ref 80–100)
MCV RBC AUTO: 66.7 FL — LOW (ref 80–100)
MONOCYTES # BLD AUTO: 0.48 K/UL — SIGNIFICANT CHANGE UP (ref 0–0.9)
MONOCYTES NFR BLD AUTO: 9.1 % — SIGNIFICANT CHANGE UP (ref 2–14)
NEUTROPHILS # BLD AUTO: 2.86 K/UL — SIGNIFICANT CHANGE UP (ref 1.8–7.4)
NEUTROPHILS NFR BLD AUTO: 54.1 % — SIGNIFICANT CHANGE UP (ref 43–77)
NITRITE UR-MCNC: NEGATIVE — SIGNIFICANT CHANGE UP
NRBC # BLD: 0 /100 WBCS — SIGNIFICANT CHANGE UP (ref 0–0)
NRBC # BLD: 0 /100 WBCS — SIGNIFICANT CHANGE UP (ref 0–0)
PH UR: 7 — SIGNIFICANT CHANGE UP (ref 5–8)
PLATELET # BLD AUTO: 280 K/UL — SIGNIFICANT CHANGE UP (ref 150–400)
PLATELET # BLD AUTO: 320 K/UL — SIGNIFICANT CHANGE UP (ref 150–400)
POTASSIUM SERPL-MCNC: 4.3 MMOL/L — SIGNIFICANT CHANGE UP (ref 3.5–5.3)
POTASSIUM SERPL-SCNC: 4.3 MMOL/L — SIGNIFICANT CHANGE UP (ref 3.5–5.3)
PROT UR-MCNC: 100 MG/DL
PROTHROM AB SERPL-ACNC: 17.5 SEC — HIGH (ref 9.5–13)
RBC # BLD: 3.84 M/UL — SIGNIFICANT CHANGE UP (ref 3.8–5.2)
RBC # BLD: 3.93 M/UL — SIGNIFICANT CHANGE UP (ref 3.8–5.2)
RBC # FLD: 19.8 % — HIGH (ref 10.3–14.5)
RBC # FLD: 22.1 % — HIGH (ref 10.3–14.5)
RBC CASTS # UR COMP ASSIST: >1900 /HPF — HIGH (ref 0–4)
SODIUM SERPL-SCNC: 137 MMOL/L — SIGNIFICANT CHANGE UP (ref 135–145)
SP GR SPEC: 1.01 — SIGNIFICANT CHANGE UP (ref 1–1.03)
SQUAMOUS # UR AUTO: 2 /HPF — SIGNIFICANT CHANGE UP (ref 0–5)
TIBC SERPL-MCNC: 428 UG/DL
UIBC SERPL-MCNC: 416 UG/DL
UROBILINOGEN FLD QL: 0.2 MG/DL — SIGNIFICANT CHANGE UP (ref 0.2–1)
VIT B12 SERPL-MCNC: 765 PG/ML
WBC # BLD: 5.29 K/UL — SIGNIFICANT CHANGE UP (ref 3.8–10.5)
WBC # BLD: 7.13 K/UL — SIGNIFICANT CHANGE UP (ref 3.8–10.5)
WBC # FLD AUTO: 5.29 K/UL — SIGNIFICANT CHANGE UP (ref 3.8–10.5)
WBC # FLD AUTO: 7.13 K/UL — SIGNIFICANT CHANGE UP (ref 3.8–10.5)
WBC UR QL: 11 /HPF — HIGH (ref 0–5)

## 2024-03-08 PROCEDURE — 99285 EMERGENCY DEPT VISIT HI MDM: CPT

## 2024-03-08 PROCEDURE — 99222 1ST HOSP IP/OBS MODERATE 55: CPT | Mod: GC

## 2024-03-08 RX ORDER — ATORVASTATIN CALCIUM 80 MG/1
20 TABLET, FILM COATED ORAL AT BEDTIME
Refills: 0 | Status: DISCONTINUED | OUTPATIENT
Start: 2024-03-08 | End: 2024-03-09

## 2024-03-08 RX ORDER — SODIUM CHLORIDE 9 MG/ML
1000 INJECTION INTRAMUSCULAR; INTRAVENOUS; SUBCUTANEOUS ONCE
Refills: 0 | Status: COMPLETED | OUTPATIENT
Start: 2024-03-08 | End: 2024-03-08

## 2024-03-08 RX ORDER — PANTOPRAZOLE SODIUM 20 MG/1
40 TABLET, DELAYED RELEASE ORAL
Refills: 0 | Status: DISCONTINUED | OUTPATIENT
Start: 2024-03-08 | End: 2024-03-09

## 2024-03-08 RX ORDER — ACETAMINOPHEN 500 MG
650 TABLET ORAL EVERY 6 HOURS
Refills: 0 | Status: DISCONTINUED | OUTPATIENT
Start: 2024-03-08 | End: 2024-03-09

## 2024-03-08 RX ORDER — LANOLIN ALCOHOL/MO/W.PET/CERES
3 CREAM (GRAM) TOPICAL AT BEDTIME
Refills: 0 | Status: DISCONTINUED | OUTPATIENT
Start: 2024-03-08 | End: 2024-03-09

## 2024-03-08 RX ORDER — APIXABAN 2.5 MG/1
5 TABLET, FILM COATED ORAL EVERY 12 HOURS
Refills: 0 | Status: DISCONTINUED | OUTPATIENT
Start: 2024-03-08 | End: 2024-03-09

## 2024-03-08 RX ADMIN — ATORVASTATIN CALCIUM 20 MILLIGRAM(S): 80 TABLET, FILM COATED ORAL at 22:05

## 2024-03-08 RX ADMIN — SODIUM CHLORIDE 1000 MILLILITER(S): 9 INJECTION INTRAMUSCULAR; INTRAVENOUS; SUBCUTANEOUS at 11:22

## 2024-03-08 RX ADMIN — APIXABAN 5 MILLIGRAM(S): 2.5 TABLET, FILM COATED ORAL at 22:06

## 2024-03-08 NOTE — ED PROVIDER NOTE - ATTENDING APP SHARED VISIT CONTRIBUTION OF CARE
The pt is a 44 y/o F, who presents to ED c/o feeling weak, lightheaded, + palpitations, + sob, and having heavy vag bleeding (day 2 of menses), had outpatient labs yest - hgb 6.4. PMH antiphospholipid syn (eliquis), CVA. Pt states her menses have been very heavy since starting AC and is followed by gyn (dr tyson) and heme, has had PRBCs in past. Denies cp, n/v/d, abd pain, syncope, loc, fevers, chills.    pt c/o sob, dizziness and dizziness - found to be anemic to 6.4 on outpatient labs yest, pmd antiphospholipid - CVA on eliquis, hx of heavy menses and currently on day 2, bleeding 1pad/hr, pt pale appearing, gets sob on exertion, found to be anemic - PRBC started, gyn consulted and only recommending med admission for transfusion/no intervention. pt hemodynamically stable    Addendum to attending statement: I have reviewed the ACP note and agree with the history, exam, and plan of care. I  was available to PA   as a supervising provider if needed. PA given opportunity to ask questions and request further evaluation / care.

## 2024-03-08 NOTE — ED ADULT NURSE NOTE - CHIEF COMPLAINT QUOTE
44 y/o female sent by hematologist for Hgb of 7.0, pt is asymptomatic with weakness, lightheadedness and palpitations. Pt with hx of Afib, CVA on Eliquis, on her period at this time.

## 2024-03-08 NOTE — ED PROVIDER NOTE - CLINICAL SUMMARY MEDICAL DECISION MAKING FREE TEXT BOX
pt c/o sob, dizziness and dizziness - found to be anemic to 6.4 on outpatient labs yest, pmd antiphospholipid - CVA on eliquis, hx of heavy menses and currently on day 2, bleeding 1pad/hr, pt pale appearing, gets sob on exertion, found to be anemic - PRBC started, gyn consulted and only recommending med admission for transfusion/no intervention. pt hemodynamically stable

## 2024-03-08 NOTE — ED ADULT TRIAGE NOTE - CHIEF COMPLAINT QUOTE
42 y/o female sent by hematologist for Hgb of 7.0, pt is asymptomatic with weakness, lightheadedness and palpitations. Pt with hx of Afib, CVA on Eliquis, on her period at this time.

## 2024-03-08 NOTE — ED PROVIDER NOTE - OBJECTIVE STATEMENT
The pt is a 44 y/o F, who presents to ED c/o feeling weak, lightheaded, + palpitations, + sob, and having heavy vag bleeding (day 2 of menses), had outpatient labs yest - hgb 6.4. PMH antiphospholipid syn (eliquis), CVA. Pt states her menses have been very heavy since starting AC and is followed by gyn (dr tyson) and heme, has had PRBCs in past. Denies cp, n/v/d, abd pain, syncope, loc, fevers, chills.

## 2024-03-08 NOTE — REVIEW OF SYSTEMS
[Fatigue] : fatigue [Palpitations] : palpitations [Insomnia] : insomnia [Negative] : Allergic/Immunologic [FreeTextEntry2] : brain fog

## 2024-03-08 NOTE — H&P ADULT - HISTORY OF PRESENT ILLNESS
43 year old female with PMhx of R MCA ischemic stroke (May 2022), PFO closed in July 2022, paroxysmal Afib, antiphospholipid syndrome currently on eliquis 5mg BID presenting for blood transfusion per hematologist. Patient was seen by her hematologist yesterday and had bloodwork done which showed anemia. Her hematologist called her today and told her to go to the ED for a blood transfusion. Patient is currently menstruating (on day 2) and has large amount of bleeding, which is normal for her and worsening since starting eliquis. Currently changing pads once per hour. Patient reports that she has been feeling lightheaded, dizzy, and occasionally short of breath for the past week. Patient is currently planning to undergo endometrial ablation with Dr. Sheppard.    Pt denies fever, chills, chest pain, abdominal pain, nausea, vomiting.     Patient previously was admitted for heavy vaginal bleeding in June 2022. She received 3U PRBC and 1U cryo during that admission. During that admission she had intracervical fibroid that was removed.     ED COURSE:  Vitals: T 97.8, HR 97 -> 75, /82, RR 22 -> 18, SpO2 100% on room air  Significant Labs: Hb 6.4, INR 1.55  EKG: NSR  Imaging: None  Interventions: NS 1 L x1, 1 unit PRBC

## 2024-03-08 NOTE — HISTORY OF PRESENT ILLNESS
[de-identified] : Milly Jimenez is a 43-year-old female who presents to the clinic for initial consultation for anemia.  The patient has a history of APLS which was diagnosed after he developed a stroke in 2022.  During her hospital stay she was found to have positive anticardiolipin antibody which remained persistently in subsequent labs.  Patient has been offered treatment with warfarin and Lovenox but is preferred to remain on Eliquis given the ease of administration.  She was hospitalized again in September 2023 for concern for TIA, but ultimately after assessment by neurology was felt not to have suffered such an event.  She was therefore continued on Eliquis, which she continues to take.   In addition to this history, the patient suffers from iron deficiency anemia.  This is felt to be due to her very heavy menstruation-she states that her period each month associated with 7 or 8 days of "debilitating" bleeding.  She also reports generalized fatigue and decreased exercise tolerance.  She is considering endometrial ablation with GYN.

## 2024-03-08 NOTE — CONSULT NOTE ADULT - SUBJECTIVE AND OBJECTIVE BOX
Patient is a 42yo  presenting for   Pt denies fever, chills, chest pain, SOB, abdominal pain, nausea, vomiting, vaginal bleeding      OB/GYN Hx:   Last PAP smear:     PMHx:   SHx:  Meds:   Allergies:     Social hx:     PHYSICAL EXAM:   Vital Signs Last 24 Hrs  T(C): 36.6 (08 Mar 2024 09:57), Max: 36.6 (08 Mar 2024 09:57)  T(F): 97.8 (08 Mar 2024 09:57), Max: 97.8 (08 Mar 2024 09:57)  HR: 97 (08 Mar 2024 09:57) (97 - 97)  BP: 129/82 (08 Mar 2024 09:57) (129/82 - 129/82)  BP(mean): --  RR: 22 (08 Mar 2024 09:57) (22 - 22)  SpO2: 100% (08 Mar 2024 09:57) (100% - 100%)    Parameters below as of 08 Mar 2024 09:57  Patient On (Oxygen Delivery Method): room air        **************************  Constitutional: Alert & Oriented x3, No acute distress  Respiratory: Clear to ausculation bilaterally; no wheezing, rhonchi, or crackles  Cardiovascular: regular rate and rhythm, no murmurs, or gallops  Gastrointestinal: soft, non tender, positive bowel sounds, no rebound or guarding   Pelvic exam:   Extremities: no calf tenderness or swelling    LABS:                        6.4    5.29  )-----------( 320      ( 08 Mar 2024 10:48 )             23.3     03-08    137  |  105  |  18  ----------------------------<  96  4.3   |  26  |  0.87    Ca    9.7      08 Mar 2024 10:48      PT/INR - ( 08 Mar 2024 10:48 )   PT: 17.5 sec;   INR: 1.55          PTT - ( 08 Mar 2024 10:48 )  PTT:38.9 sec  Urinalysis Basic - ( 08 Mar 2024 10:48 )    Color: x / Appearance: x / SG: x / pH: x  Gluc: 96 mg/dL / Ketone: x  / Bili: x / Urobili: x   Blood: x / Protein: x / Nitrite: x   Leuk Esterase: x / RBC: x / WBC x   Sq Epi: x / Non Sq Epi: x / Bacteria: x          RADIOLOGY & ADDITIONAL STUDIES: Patient is a 42yo  presenting for blood transfusion. Patient was seen by her hematologist yesterday and was informed to go to ED for blood transfusion.Patient currently with vaginal bleeding secondary to menses but currently on Eliquis 5mg BID for history of ischemic stroke in , antiphospholipid syndrome, and Afib. Patient reports that she has been feeling lightheaded and dizzy for the past week. She also noted increased shortness of breath while walking and speaking & chest palpitations over the past day. She has been craving ice chips. Patient is currently on the second day of her menses. She reports that her periods are very heavy and usually goes through 3 large pads per day. This period currently is not more than usual. Patient is currently planning to undergo endometrial ablation with Dr. Sheppard; however, she has been attempting to get medical clearance by Neurology and cardiology for surgeyr. Patient has history of right MCA ischemic stroke in May 2022 and currently is on Eliquis 5mg BID. Patient has no residual neurological deficits. Pt denies fever, chills, chest pain, abdominal pain, nausea, vomiting.     Patient previously was admitted for heavy vaginal bleeding in 2022. She received 3U PRBC and 1U cryo during that admission. During that admission she had intracervical fibroid that was removed.       OB/GYN Hx:   G1 -  c/b PEC with SF   G2 -     G3 -    G4 - [facility] D&C    G5 -     G6 - [facility] medical    G7 - SAB   GynHx: no fibroids, cysts, or STIs.   Last PAP smear: ASCUS, HPV positive 2023. Colposcopy January normal.     PMHx:   #Right MCA ischemic stroke (May 2022): follows with neurologist Dr. Messer, next appointment in 2024.  #Antiphospholipid syndrome: follows with hematologist Dr. Juárez   #Paroxysmal Afib - with loop recorder in place (May 2022)     SHx:   #PFO closed in 2022   #Hysteroscopic myomectomy of intracervical fibroid 2022     Meds: Eliquis 5mg BID, Lipitor 20mg qhs, vitamin D, omeprazole 20mg Qday, oral iron (every other day)   Allergies: NKDA     PHYSICAL EXAM:   Vital Signs Last 24 Hrs  T(C): 36.6 (08 Mar 2024 09:57), Max: 36.6 (08 Mar 2024 09:57)  T(F): 97.8 (08 Mar 2024 09:57), Max: 97.8 (08 Mar 2024 09:57)  HR: 97 (08 Mar 2024 09:57) (97 - 97)  BP: 129/82 (08 Mar 2024 09:57) (129/82 - 129/82)  BP(mean): --  RR: 22 (08 Mar 2024 09:57) (22 - 22)  SpO2: 100% (08 Mar 2024 09:57) (100% - 100%)    Parameters below as of 08 Mar 2024 09:57  Patient On (Oxygen Delivery Method): room air        **************************  Constitutional: Alert & Oriented x3, No acute distress  Respiratory: Clear to ausculation bilaterally; no wheezing, rhonchi, or crackles  Cardiovascular: regular rate and rhythm, no murmurs, or gallops  Gastrointestinal: soft, non tender, positive bowel sounds, no rebound or guarding   Pelvic exam: external genitalia normal, normal vaginal mucosa, 20cc of red blood in vaginal vault. No active bleeding from cervical os. On bimanual examination, no cervical motion tenderness, no adnexal masses palpated.   Extremities: no calf tenderness or swelling    LABS:                        6.4    5.29  )-----------( 320      ( 08 Mar 2024 10:48 )             23.3     03-08    137  |  105  |  18  ----------------------------<  96  4.3   |  26  |  0.87    Ca    9.7      08 Mar 2024 10:48      PT/INR - ( 08 Mar 2024 10:48 )   PT: 17.5 sec;   INR: 1.55          PTT - ( 08 Mar 2024 10:48 )  PTT:38.9 sec  Urinalysis Basic - ( 08 Mar 2024 10:48 )    Color: x / Appearance: x / SG: x / pH: x  Gluc: 96 mg/dL / Ketone: x  / Bili: x / Urobili: x   Blood: x / Protein: x / Nitrite: x   Leuk Esterase: x / RBC: x / WBC x   Sq Epi: x / Non Sq Epi: x / Bacteria: x       Patient is a 44yo  (LMP 3/7/24) with history of R MCA ischemic stroke (May 2022), PFO closed in 2022, paroxysmal Afib, antiphospholipid syndrome currently on eliquis 5mg BID presenting for symptomatic anemia in setting of vaginal bleeding with menses. Patient was seen by her hematologist yesterday and was informed to go to ED for blood transfusion for low hemoglobin. Patient currently with vaginal bleeding secondary to menses but currently on Eliquis 5mg BID for history of ischemic stroke in , antiphospholipid syndrome, and Afib. Patient reports that she has been feeling lightheaded and dizzy for the past week. She also noted increased shortness of breath while walking and speaking & chest palpitations over the past day. She has been craving ice chips. Patient is currently on the second day of her menses. She reports that her periods are heavy and usually goes through 3 large pads per day. This period currently is not more than usual. Patient is currently planning to undergo endometrial ablation with Dr. Sheppard; however, she has been attempting to get medical clearance by Neurology and cardiology for surgery. Patient has history of right MCA ischemic stroke in May 2022 and currently is on Eliquis 5mg BID. Patient has no residual neurological deficits. Pt denies fever, chills, chest pain, abdominal pain, nausea, vomiting.     Patient previously was admitted for heavy vaginal bleeding in 2022. She received 3U PRBC and 1U cryo during that admission. During that admission she had intracervical fibroid that was removed.       OB/GYN Hx:   G1 -  c/b PEC with SF   G2 -     G3 -    G4 - [facility] D&C    G5 -     G6 - [facility] medical    G7 - SAB   GynHx: no fibroids, cysts, or STIs.   Last PAP smear: ASCUS, HPV positive 2023. Colposcopy January normal.     PMHx:   #Right MCA ischemic stroke (May 2022): follows with neurologist Dr. Messer, next appointment in 2024.  #Antiphospholipid syndrome: follows with hematologist Dr. Juárez   #Paroxysmal Afib - with loop recorder in place (May 2022)     SHx:   #PFO closed in 2022   #Hysteroscopic myomectomy of intracervical fibroid 2022     Meds: Eliquis 5mg BID, Lipitor 20mg qhs, vitamin D, omeprazole 20mg Qday, oral iron (every other day)   Allergies: NKDA     PHYSICAL EXAM:   Vital Signs Last 24 Hrs  T(C): 36.6 (08 Mar 2024 09:57), Max: 36.6 (08 Mar 2024 09:57)  T(F): 97.8 (08 Mar 2024 09:57), Max: 97.8 (08 Mar 2024 09:57)  HR: 97 (08 Mar 2024 09:57) (97 - 97)  BP: 129/82 (08 Mar 2024 09:57) (129/82 - 129/82)  BP(mean): --  RR: 22 (08 Mar 2024 09:57) (22 - 22)  SpO2: 100% (08 Mar 2024 09:57) (100% - 100%)    Parameters below as of 08 Mar 2024 09:57  Patient On (Oxygen Delivery Method): room air        **************************  Constitutional: Alert & Oriented x3, No acute distress  Respiratory: Clear to ausculation bilaterally; no wheezing, rhonchi, or crackles  Cardiovascular: regular rate and rhythm, no murmurs, or gallops  Gastrointestinal: soft, non tender, positive bowel sounds, no rebound or guarding   Pelvic exam: external genitalia normal, normal vaginal mucosa, 20cc of red blood in vaginal vault. No active bleeding from cervical os. On bimanual examination, no cervical motion tenderness, no adnexal masses palpated.   Extremities: no calf tenderness or swelling    LABS:                        6.4    5.29  )-----------( 320      ( 08 Mar 2024 10:48 )             23.3     03-08    137  |  105  |  18  ----------------------------<  96  4.3   |  26  |  0.87    Ca    9.7      08 Mar 2024 10:48      PT/INR - ( 08 Mar 2024 10:48 )   PT: 17.5 sec;   INR: 1.55          PTT - ( 08 Mar 2024 10:48 )  PTT:38.9 sec  Urinalysis Basic - ( 08 Mar 2024 10:48 )    Color: x / Appearance: x / SG: x / pH: x  Gluc: 96 mg/dL / Ketone: x  / Bili: x / Urobili: x   Blood: x / Protein: x / Nitrite: x   Leuk Esterase: x / RBC: x / WBC x   Sq Epi: x / Non Sq Epi: x / Bacteria: x       Patient is a 42yo  (LMP 3/7/24) with history of R MCA ischemic stroke (May 2022), PFO closed in 2022, paroxysmal Afib, antiphospholipid syndrome currently on eliquis 5mg BID presenting for blood transfusion per hematologist. Patient was seen by her hematologist yesterday and was informed to go to ED for blood transfusion for low hemoglobin. Patient currently with vaginal bleeding secondary to menses but currently on Eliquis 5mg BID for history of ischemic stroke in , antiphospholipid syndrome, and Afib. Patient reports that she has been feeling lightheaded and dizzy for the past week. She also noted increased shortness of breath while walking and speaking & chest palpitations over the past day. She has been craving ice chips. Patient is currently on the second day of her menses. She reports that her periods are heavy and usually goes through 3 large pads per day. This period currently is not more than usual. Patient is currently planning to undergo endometrial ablation with Dr. Sheppard; however, she has been attempting to get medical clearance by Neurology and cardiology for surgery. Patient has no residual neurological deficits. Pt denies fever, chills, chest pain, abdominal pain, nausea, vomiting.     Patient previously was admitted for heavy vaginal bleeding in 2022. She received 3U PRBC and 1U cryo during that admission. During that admission she had intracervical fibroid that was removed.       OB/GYN Hx:   G1 -  c/b PEC with SF   G2 -     G3 -    G4 - [facility] D&C    G5 -     G6 - [facility] medical    G7 - SAB   GynHx: no fibroids, cysts, or STIs.   Last PAP smear: ASCUS, HPV positive 2023. Colposcopy January normal.     PMHx:   #Right MCA ischemic stroke (May 2022): follows with neurologist Dr. Messer, next appointment in 2024.  #Antiphospholipid syndrome: follows with hematologist Dr. Juárez   #Paroxysmal Afib - with loop recorder in place (May 2022)     SHx:   #PFO closed in 2022   #Hysteroscopic myomectomy of intracervical fibroid 2022     Meds: Eliquis 5mg BID, Lipitor 20mg qhs, vitamin D, omeprazole 20mg Qday, oral iron (every other day)   Allergies: NKDA     PHYSICAL EXAM:   Vital Signs Last 24 Hrs  T(C): 36.6 (08 Mar 2024 09:57), Max: 36.6 (08 Mar 2024 09:57)  T(F): 97.8 (08 Mar 2024 09:57), Max: 97.8 (08 Mar 2024 09:57)  HR: 97 (08 Mar 2024 09:57) (97 - 97)  BP: 129/82 (08 Mar 2024 09:57) (129/82 - 129/82)  BP(mean): --  RR: 22 (08 Mar 2024 09:57) (22 - 22)  SpO2: 100% (08 Mar 2024 09:57) (100% - 100%)    Parameters below as of 08 Mar 2024 09:57  Patient On (Oxygen Delivery Method): room air        **************************  Constitutional: Alert & Oriented x3, No acute distress  Respiratory: Clear to ausculation bilaterally; no wheezing, rhonchi, or crackles  Cardiovascular: regular rate and rhythm, no murmurs, or gallops  Gastrointestinal: soft, non tender, positive bowel sounds, no rebound or guarding   Pelvic exam: external genitalia normal, normal vaginal mucosa, 20cc of red blood in vaginal vault. No active bleeding from cervical os. On bimanual examination, no cervical motion tenderness, no adnexal masses palpated.   Extremities: no calf tenderness or swelling    LABS:                        6.4    5.29  )-----------( 320      ( 08 Mar 2024 10:48 )             23.3     03-08    137  |  105  |  18  ----------------------------<  96  4.3   |  26  |  0.87    Ca    9.7      08 Mar 2024 10:48      PT/INR - ( 08 Mar 2024 10:48 )   PT: 17.5 sec;   INR: 1.55          PTT - ( 08 Mar 2024 10:48 )  PTT:38.9 sec  Urinalysis Basic - ( 08 Mar 2024 10:48 )    Color: x / Appearance: x / SG: x / pH: x  Gluc: 96 mg/dL / Ketone: x  / Bili: x / Urobili: x   Blood: x / Protein: x / Nitrite: x   Leuk Esterase: x / RBC: x / WBC x   Sq Epi: x / Non Sq Epi: x / Bacteria: x

## 2024-03-08 NOTE — H&P ADULT - NSHPREVIEWOFSYSTEMS_GEN_ALL_CORE
REVIEW OF SYSTEMS:  CONSTITUTIONAL: No weakness, fevers or chills. +Occasional dizziness/lightheadedness   EYES/ENT: No visual changes;  No vertigo or throat pain.    NECK: No pain or stiffness.  RESPIRATORY: No cough, wheezing, hemoptysis; +Occasional SOB with exertion, improving with blood transfusion  CARDIOVASCULAR: No chest pain or palpitations.  GASTROINTESTINAL: No abdominal or epigastric pain. No nausea, vomiting, or hematemesis; No diarrhea or constipation. No melena or hematochezia.   GENITOURINARY: No dysuria, frequency or hematuria  NEUROLOGICAL: No numbness or weakness  SKIN: No itching, rashes

## 2024-03-08 NOTE — H&P ADULT - ATTENDING COMMENTS
43F w RCA stroke 5/2022, APLAS, s/p PFO closure - on eliquis 5mg BID, fibroids awaiting endometrial ablation pending neurology and cardiology evaluation, p/w QUINTANILLA, lightheadedness, found to have hgb 6,     #Chronic blood loss anemia - 6.4. MCV 60.   #OMER - home on PO iron 325mg qOther day    #R MCA stroke - on lipitor 20mg daily  #APLAS - on eliquis 5mg BID    plan  2u RBC transfusing  Post transfusion CBC  Counseled on PO iron 325mg daily  Resume eliquis 5mg BID.   pad count     INCOMPLETE 43F w RCA stroke 5/2022, APLAS, s/p PFO closure - on eliquis 5mg BID, fibroids awaiting endometrial ablation pending neurology and cardiology evaluation, p/w QUINTANILLA, lightheadedness, found to have hgb 6,     Pt reports increased bleeding during menses since starting eliquis BID. Reports tyipcally they last 7d, heaviest on days 2-3 (currently on day 2). Denies hematuria, BRBPR - notes intermittent spotting while wiping d/t hemorroids. Reports increased QUINTANILLA, fatigue, palpitations. No chest pain, syncope.  Exam: female in NAD on RA, MMM, RRR, nml resp effort, CTAB, Abd soft, NABS, non-tender, Alert, moving all ext, no BLE edema    #Chronic blood loss anemia - 6.4. MCV 60.   #OMER - home on PO iron 325mg qOther day    #R MCA stroke - on lipitor 20mg daily  #APLAS - on eliquis 5mg BID    plan  2u RBC transfusing  Post transfusion CBC; CBC in AM  Counseled on PO iron 325mg qOther day. Pt reports Dr. Juárez scheduling her for outpatient iron infusions.  Resume eliquis 5mg BID.   pad count     DISPO: Home pending stable hgb

## 2024-03-08 NOTE — H&P ADULT - ASSESSMENT
43 year old female with PMhx of R MCA ischemic stroke (May 2022), PFO closed in July 2022, paroxysmal Afib, antiphospholipid syndrome currently on eliquis 5mg BID presenting for anemia noted out outpatient labs.

## 2024-03-08 NOTE — CONSULT NOTE ADULT - ASSESSMENT
Patient is a 42yo  presenting for blood transfusion. Patient was seen by her hematologist yesterday and was informed to go to ED for blood transfusion. Patient currently with vaginal bleeding secondary to menses but currently on Eliquis 5mg BID for history of ischemic stroke in , antiphospholipid syndrome, and Afib. In ED, vital signs stable. Hemoglobin noted to be 6.4.  NOTE PENDING   Patient is a 42yo  presenting for blood transfusion. Patient was seen by her hematologist yesterday and was informed to go to ED for blood transfusion. Patient currently with vaginal bleeding secondary to menses but currently on Eliquis 5mg BID for history of ischemic stroke in , antiphospholipid syndrome, and Afib. In ED, vital signs stable. Hemoglobin noted to be 6.4.   - No acute GYN intervention at this time. Vaginal bleeding similar to her normal amount during menses. Patient not a candidate for TXA or estrogen given history of ischemic stroke and currently on Eliquis.   - Plan for endometrial ablation outpatient after patient has obtained medical clearance and has been optimized for surgery.   - Anemia per primary team. GYN to continue to follow while inpatient.   - D/w Dr. Tilley, PGY3 and Dr. Lucho Kinney, PGY1   Patient is a 44yo  presenting for blood transfusion. Patient was seen by her hematologist yesterday and was informed to go to ED for blood transfusion. Patient currently with vaginal bleeding secondary to menses but currently on Eliquis 5mg BID for history of ischemic stroke in , antiphospholipid syndrome, and Afib. In ED, vital signs stable. Hemoglobin noted to be 6.4.   - No acute GYN intervention at this time. Vaginal bleeding similar to her normal amount during menses. Patient not a candidate for TXA or estrogen given history of ischemic stroke and currently on Eliquis.   - Recommend strict pad counts.   - Plan for endometrial ablation outpatient after patient has obtained medical clearance and has been optimized for surgery.   - Anemia per primary team. GYN to continue to follow while inpatient.   - D/w Dr. Tilley, PGY3 and Dr. Lucho Kinney, PGY1

## 2024-03-08 NOTE — PHYSICAL EXAM
[Normal] : affect appropriate [de-identified] : Normal work of breathing on room air.  Speaking full sentences normal respiratory rate on room air.  Speaking full sentences without increased work of breathing

## 2024-03-08 NOTE — ED ADULT NURSE NOTE - OBJECTIVE STATEMENT
Pt present to the ED for low Hgb lab result from outpt labs and fatigue, lightheadedness, shortness of breath. Pt menses has been heavy, changing pad approximately every two hours. PMH of CVA 2022, on Eliquis, PFO, loop recorder, myomectomy and anemia. Pt denies headache, chest pain, n/v/d, fever/chills or urinary symptoms. On assessment, A+Ox4, in no distress, color normal for race, speaking in full sentences, sating at 99% on continuous pulse O2. Ambulating independently with a steady gait. Pending lab results and possible transfusion.

## 2024-03-08 NOTE — H&P ADULT - NSHPPHYSICALEXAM_GEN_ALL_CORE
GENERAL: NAD, lying in bed comfortably  HEAD:  Atraumatic, Normocephalic  EYES: EOMI, PERRLA, conjunctiva and sclera clear  ENT: Moist mucous membranes  NECK: Supple, No JVD  CHEST/LUNG: Clear to auscultation bilaterally; No rales, rhonchi, wheezing, or rubs. Unlabored respirations  HEART: Regular rate and rhythm; No murmurs, rubs, or gallops  ABDOMEN: BSx4; Soft, nontender, nondistended.   EXTREMITIES:  2+ Peripheral Pulses, brisk capillary refill. No clubbing, cyanosis, or edema.   NERVOUS SYSTEM:  A&Ox3, no focal deficits   SKIN: No rashes or lesions

## 2024-03-08 NOTE — H&P ADULT - PROBLEM SELECTOR PLAN 3
Pt with stroke in May 2022, diagnosed with APLS  Seeing hematology outpatient    -c/w eliquis 5mg BID and atorvastatin 40mg daily

## 2024-03-08 NOTE — H&P ADULT - PROBLEM SELECTOR PLAN 1
Pt presenting with symptoms of anemia (slight QUINTANILLA and lightheadedness) and found to have Hb of 6.4 at outpatient office  Anemia due to heavy menstrual bleeding which has worsened since patient started eliquis  Pt is planning for endometrial ablation as outpatient    Plan:  -2 u PRBC and repeat CBC  -GYN recs -> No interventions at this time, follow up outpatient with GYN outpatient for endometrial ablation planning

## 2024-03-08 NOTE — PATIENT PROFILE ADULT - FALL HARM RISK - HARM RISK INTERVENTIONS

## 2024-03-09 ENCOUNTER — TRANSCRIPTION ENCOUNTER (OUTPATIENT)
Age: 44
End: 2024-03-09

## 2024-03-09 VITALS
DIASTOLIC BLOOD PRESSURE: 61 MMHG | SYSTOLIC BLOOD PRESSURE: 99 MMHG | OXYGEN SATURATION: 100 % | RESPIRATION RATE: 18 BRPM | TEMPERATURE: 98 F | HEART RATE: 64 BPM

## 2024-03-09 LAB
A1C WITH ESTIMATED AVERAGE GLUCOSE RESULT: 5.4 % — SIGNIFICANT CHANGE UP (ref 4–5.6)
ACANTHOCYTES BLD QL SMEAR: SLIGHT — SIGNIFICANT CHANGE UP
ALBUMIN SERPL ELPH-MCNC: 4 G/DL — SIGNIFICANT CHANGE UP (ref 3.3–5)
ALP SERPL-CCNC: 48 U/L — SIGNIFICANT CHANGE UP (ref 40–120)
ALT FLD-CCNC: 9 U/L — LOW (ref 10–45)
ANION GAP SERPL CALC-SCNC: 8 MMOL/L — SIGNIFICANT CHANGE UP (ref 5–17)
ANISOCYTOSIS BLD QL: SIGNIFICANT CHANGE UP
AST SERPL-CCNC: 17 U/L — SIGNIFICANT CHANGE UP (ref 10–40)
BASOPHILS # BLD AUTO: 0.12 K/UL — SIGNIFICANT CHANGE UP (ref 0–0.2)
BASOPHILS NFR BLD AUTO: 2.6 % — HIGH (ref 0–2)
BILIRUB SERPL-MCNC: 1.2 MG/DL — SIGNIFICANT CHANGE UP (ref 0.2–1.2)
BUN SERPL-MCNC: 16 MG/DL — SIGNIFICANT CHANGE UP (ref 7–23)
BURR CELLS BLD QL SMEAR: PRESENT — SIGNIFICANT CHANGE UP
CALCIUM SERPL-MCNC: 9.2 MG/DL — SIGNIFICANT CHANGE UP (ref 8.4–10.5)
CHLORIDE SERPL-SCNC: 108 MMOL/L — SIGNIFICANT CHANGE UP (ref 96–108)
CO2 SERPL-SCNC: 23 MMOL/L — SIGNIFICANT CHANGE UP (ref 22–31)
CREAT SERPL-MCNC: 0.96 MG/DL — SIGNIFICANT CHANGE UP (ref 0.5–1.3)
DACRYOCYTES BLD QL SMEAR: SLIGHT — SIGNIFICANT CHANGE UP
EGFR: 75 ML/MIN/1.73M2 — SIGNIFICANT CHANGE UP
EOSINOPHIL # BLD AUTO: 0.16 K/UL — SIGNIFICANT CHANGE UP (ref 0–0.5)
EOSINOPHIL NFR BLD AUTO: 3.5 % — SIGNIFICANT CHANGE UP (ref 0–6)
ESTIMATED AVERAGE GLUCOSE: 108 MG/DL — SIGNIFICANT CHANGE UP (ref 68–114)
GLUCOSE SERPL-MCNC: 86 MG/DL — SIGNIFICANT CHANGE UP (ref 70–99)
HCT VFR BLD CALC: 30.5 % — LOW (ref 34.5–45)
HGB BLD-MCNC: 8.9 G/DL — LOW (ref 11.5–15.5)
HYPOCHROMIA BLD QL: SIGNIFICANT CHANGE UP
LYMPHOCYTES # BLD AUTO: 1.16 K/UL — SIGNIFICANT CHANGE UP (ref 1–3.3)
LYMPHOCYTES # BLD AUTO: 25.2 % — SIGNIFICANT CHANGE UP (ref 13–44)
MAGNESIUM SERPL-MCNC: 2 MG/DL — SIGNIFICANT CHANGE UP (ref 1.6–2.6)
MANUAL SMEAR VERIFICATION: SIGNIFICANT CHANGE UP
MCHC RBC-ENTMCNC: 20.2 PG — LOW (ref 27–34)
MCHC RBC-ENTMCNC: 29.2 GM/DL — LOW (ref 32–36)
MCV RBC AUTO: 69.3 FL — LOW (ref 80–100)
MICROCYTES BLD QL: SIGNIFICANT CHANGE UP
MONOCYTES # BLD AUTO: 0.36 K/UL — SIGNIFICANT CHANGE UP (ref 0–0.9)
MONOCYTES NFR BLD AUTO: 7.8 % — SIGNIFICANT CHANGE UP (ref 2–14)
NEUTROPHILS # BLD AUTO: 2.76 K/UL — SIGNIFICANT CHANGE UP (ref 1.8–7.4)
NEUTROPHILS NFR BLD AUTO: 60 % — SIGNIFICANT CHANGE UP (ref 43–77)
OVALOCYTES BLD QL SMEAR: SLIGHT — SIGNIFICANT CHANGE UP
PHOSPHATE SERPL-MCNC: 3.6 MG/DL — SIGNIFICANT CHANGE UP (ref 2.5–4.5)
PLAT MORPH BLD: ABNORMAL
PLATELET # BLD AUTO: 275 K/UL — SIGNIFICANT CHANGE UP (ref 150–400)
POIKILOCYTOSIS BLD QL AUTO: SIGNIFICANT CHANGE UP
POLYCHROMASIA BLD QL SMEAR: SLIGHT — SIGNIFICANT CHANGE UP
POTASSIUM SERPL-MCNC: 4.4 MMOL/L — SIGNIFICANT CHANGE UP (ref 3.5–5.3)
POTASSIUM SERPL-SCNC: 4.4 MMOL/L — SIGNIFICANT CHANGE UP (ref 3.5–5.3)
PROT SERPL-MCNC: 6.4 G/DL — SIGNIFICANT CHANGE UP (ref 6–8.3)
RBC # BLD: 4.4 M/UL — SIGNIFICANT CHANGE UP (ref 3.8–5.2)
RBC # FLD: 23.4 % — HIGH (ref 10.3–14.5)
RBC BLD AUTO: ABNORMAL
SCHISTOCYTES BLD QL AUTO: SLIGHT — SIGNIFICANT CHANGE UP
SODIUM SERPL-SCNC: 139 MMOL/L — SIGNIFICANT CHANGE UP (ref 135–145)
VARIANT LYMPHS # BLD: 0.9 % — SIGNIFICANT CHANGE UP (ref 0–6)
WBC # BLD: 4.6 K/UL — SIGNIFICANT CHANGE UP (ref 3.8–10.5)
WBC # FLD AUTO: 4.6 K/UL — SIGNIFICANT CHANGE UP (ref 3.8–10.5)

## 2024-03-09 PROCEDURE — 99239 HOSP IP/OBS DSCHRG MGMT >30: CPT | Mod: GC

## 2024-03-09 PROCEDURE — 36415 COLL VENOUS BLD VENIPUNCTURE: CPT

## 2024-03-09 PROCEDURE — 86850 RBC ANTIBODY SCREEN: CPT

## 2024-03-09 PROCEDURE — 99285 EMERGENCY DEPT VISIT HI MDM: CPT

## 2024-03-09 PROCEDURE — 85027 COMPLETE CBC AUTOMATED: CPT

## 2024-03-09 PROCEDURE — 86901 BLOOD TYPING SEROLOGIC RH(D): CPT

## 2024-03-09 PROCEDURE — 83735 ASSAY OF MAGNESIUM: CPT

## 2024-03-09 PROCEDURE — 85610 PROTHROMBIN TIME: CPT

## 2024-03-09 PROCEDURE — 83036 HEMOGLOBIN GLYCOSYLATED A1C: CPT

## 2024-03-09 PROCEDURE — 86900 BLOOD TYPING SEROLOGIC ABO: CPT

## 2024-03-09 PROCEDURE — 81001 URINALYSIS AUTO W/SCOPE: CPT

## 2024-03-09 PROCEDURE — 36430 TRANSFUSION BLD/BLD COMPNT: CPT

## 2024-03-09 PROCEDURE — 86923 COMPATIBILITY TEST ELECTRIC: CPT

## 2024-03-09 PROCEDURE — 85730 THROMBOPLASTIN TIME PARTIAL: CPT

## 2024-03-09 PROCEDURE — 85025 COMPLETE CBC W/AUTO DIFF WBC: CPT

## 2024-03-09 PROCEDURE — 80053 COMPREHEN METABOLIC PANEL: CPT

## 2024-03-09 PROCEDURE — 87086 URINE CULTURE/COLONY COUNT: CPT

## 2024-03-09 PROCEDURE — P9016: CPT

## 2024-03-09 PROCEDURE — 84100 ASSAY OF PHOSPHORUS: CPT

## 2024-03-09 PROCEDURE — 80048 BASIC METABOLIC PNL TOTAL CA: CPT

## 2024-03-09 RX ORDER — IRON SUCROSE 20 MG/ML
500 INJECTION, SOLUTION INTRAVENOUS ONCE
Refills: 0 | Status: COMPLETED | OUTPATIENT
Start: 2024-03-09 | End: 2024-03-09

## 2024-03-09 RX ADMIN — IRON SUCROSE 68.75 MILLIGRAM(S): 20 INJECTION, SOLUTION INTRAVENOUS at 13:07

## 2024-03-09 RX ADMIN — APIXABAN 5 MILLIGRAM(S): 2.5 TABLET, FILM COATED ORAL at 10:07

## 2024-03-09 RX ADMIN — Medication 650 MILLIGRAM(S): at 10:05

## 2024-03-09 RX ADMIN — Medication 650 MILLIGRAM(S): at 09:05

## 2024-03-09 RX ADMIN — PANTOPRAZOLE SODIUM 40 MILLIGRAM(S): 20 TABLET, DELAYED RELEASE ORAL at 07:40

## 2024-03-09 NOTE — DISCHARGE NOTE PROVIDER - NSDCMRMEDTOKEN_GEN_ALL_CORE_FT
Eliquis 5 mg oral tablet: 5 milligram(s) orally 2 times a day  Lipitor 20 mg oral tablet: 1 orally  omeprazole 40 mg oral delayed release capsule: 1 orally 2 times a day   Eliquis 5 mg oral tablet: 5 milligram(s) orally 2 times a day  Lipitor 20 mg oral tablet: 1 tablet orally once a day (at bedtime)  omeprazole 40 mg oral delayed release capsule: 1 orally 2 times a day

## 2024-03-09 NOTE — DISCHARGE NOTE PROVIDER - NSDCFUADDAPPT_GEN_ALL_CORE_FT
Please attend your scheduled appointments with Dr. Juárez.  Please schedule an appointment with your gynecologist to plan for your ablation.

## 2024-03-09 NOTE — DISCHARGE NOTE NURSING/CASE MANAGEMENT/SOCIAL WORK - PATIENT PORTAL LINK FT
You can access the FollowMyHealth Patient Portal offered by [facility] by registering at the following website: [email or web address]. By joining [employer]’s FollowMyHealth portal, you will also be able to view your health information using other applications (apps) compatible with our system.

## 2024-03-09 NOTE — DISCHARGE NOTE NURSING/CASE MANAGEMENT/SOCIAL WORK - NSDCPEFALRISK_GEN_ALL_CORE
For information on Fall & Injury Prevention, visit: [email or web address] OR  [email or web address] OR  https://www.cdc.gov/steadi/patient.html

## 2024-03-09 NOTE — DISCHARGE NOTE PROVIDER - NSDCFUSCHEDAPPT_GEN_ALL_CORE_FT
[facility]  AMBCHEMO  [address]  Scheduled Appointment: 03/15/2024    [facility]  AMBCHEMO  [address]  Scheduled Appointment: 03/21/2024    [facility]  AMBCHEMO  [address]  Scheduled Appointment: 03/29/2024    [facility]  AMBCHEMO  [address]  Scheduled Appointment: 04/05/2024    Andres Juárez  [facility] Physician [facility]  HEMONC [address]  Scheduled Appointment: 04/11/2024    Laurent Oshea  [facility] Physician [facility]  NEUROLOGY [address]  Scheduled Appointment: 04/11/2024     [facility] Physician [facility]  AMBCHEMO  [address]  Scheduled Appointment: 03/21/2024    [facility]  AMBCHEMO  [address]  Scheduled Appointment: 03/29/2024    [facility]  AMBCHEMO  [address]  Scheduled Appointment: 04/05/2024    Andres Juárez  [facility]  HEMONC [address]  Scheduled Appointment: 04/11/2024

## 2024-03-09 NOTE — DISCHARGE NOTE PROVIDER - NSDCCPCAREPLAN_GEN_ALL_CORE_FT
PRINCIPAL DISCHARGE DIAGNOSIS  Diagnosis: Acute blood loss anemia  Assessment and Plan of Treatment:       SECONDARY DISCHARGE DIAGNOSES  Diagnosis: History of right MCA stroke  Assessment and Plan of Treatment:     Diagnosis: Symptomatic anemia  Assessment and Plan of Treatment:     Diagnosis: Antiphospholipid syndrome  Assessment and Plan of Treatment:      PRINCIPAL DISCHARGE DIAGNOSIS  Diagnosis: Acute blood loss anemia  Assessment and Plan of Treatment: You came to the hospital after feeling weak for a few months and discovering at your hematology appointment that your hemoglobin was low. As you know, your low hemoglobin and blood loss are due to your heavy periods, made worse by the fact that you have to be on a blood thinner to help prevent clotting in the setting of your anti-phospholipid syndrome. You received 3 blood transfusions and your hemoglobin was 8.9 on the day of discharge. You also received IV iron. You have an appointment scheduled with your hematologist for next week. Please also schedule an appointment with your gynecologist to further discuss and schedule your endometrial ablation, which should hopefully reduce the volume of blood loss during your periods. Please call your doctor or go to the nearest ER if you start to bleed more than normal, or if you experience any other concerning symptoms, such as a fast heart rate or those you felt when your hemoglobin was low, as these may be signs that you are losing blood at a faster rate and may need additonal transfusions.

## 2024-03-09 NOTE — PROGRESS NOTE ADULT - SUBJECTIVE AND OBJECTIVE BOX
Pt seen and examined at bedside. Pt states mild abdominal pain. Pt ambulating, tolerating diet, + flatus, urinating adequately.   Pt denies fever, chills, chest pain, SOB, nausea, vomiting, lightheadedness, dizziness.      T(F): 97.6 (03-09-24 @ 06:03), Max: 98.5 (03-08-24 @ 19:06)  HR: 58 (03-09-24 @ 07:04) (57 - 97)  BP: 98/60 (03-09-24 @ 07:04) (82/57 - 129/82)  RR: 17 (03-09-24 @ 06:03) (16 - 22)  SpO2: 99% (03-09-24 @ 06:03) (95% - 100%)  Wt(kg): --  I&O's Summary      MEDICATIONS  (STANDING):  apixaban 5 milliGRAM(s) Oral every 12 hours  atorvastatin 20 milliGRAM(s) Oral at bedtime  pantoprazole    Tablet 40 milliGRAM(s) Oral before breakfast    MEDICATIONS  (PRN):  acetaminophen     Tablet .. 650 milliGRAM(s) Oral every 6 hours PRN Temp greater or equal to 38C (100.4F), Mild Pain (1 - 3)  aluminum hydroxide/magnesium hydroxide/simethicone Suspension 30 milliLiter(s) Oral every 4 hours PRN Dyspepsia  melatonin 3 milliGRAM(s) Oral at bedtime PRN Insomnia      Physical Exam:  Constitutional: NAD  Pulmonary: no increased WOB  Abdomen: Soft, mildly tender, nondistended, no guarding, no rebound  Extremities: no lower extremity edema or calf tenderness. SCDs in place     LABS:                        8.9    4.60  )-----------( 275      ( 09 Mar 2024 05:30 )             30.5     03-09    139  |  108  |  16  ----------------------------<  86  4.4   |  23  |  0.96    Ca    9.2      09 Mar 2024 05:30  Phos  3.6     03-09  Mg     2.0     03-09    TPro  6.4  /  Alb  4.0  /  TBili  1.2  /  DBili  x   /  AST  17  /  ALT  9<L>  /  AlkPhos  48  03-09    PT/INR - ( 08 Mar 2024 10:48 )   PT: 17.5 sec;   INR: 1.55          PTT - ( 08 Mar 2024 10:48 )  PTT:38.9 sec  Urinalysis Basic - ( 09 Mar 2024 05:30 )    Color: x / Appearance: x / SG: x / pH: x  Gluc: 86 mg/dL / Ketone: x  / Bili: x / Urobili: x   Blood: x / Protein: x / Nitrite: x   Leuk Esterase: x / RBC: x / WBC x   Sq Epi: x / Non Sq Epi: x / Bacteria: x        RADIOLOGY & ADDITIONAL TESTS:

## 2024-03-09 NOTE — DISCHARGE NOTE PROVIDER - PROVIDER TOKENS
PROVIDER:[TOKEN:[[id number]],SCHEDULEDAPPT:[03/15/2024],SCHEDULEDAPPTTIME:[08:40 AM],ESTABLISHEDPATIENT:[T]]

## 2024-03-09 NOTE — PROGRESS NOTE ADULT - ASSESSMENT
42yo  with history of ischemic stroke, PFO, paroxysmal Afib, APLS on eliquis 5mg BID presenting w/ Hgb 6.4 from hematologist, currently menstruating   - bleeding stable  - care per primary team

## 2024-03-09 NOTE — DISCHARGE NOTE PROVIDER - ATTENDING DISCHARGE PHYSICAL EXAMINATION:
Patient was seen and examined at bedside on 3/9/2024 at 11 am. Patient feels well, previous symptoms of weakness have resolved. Denies SOB, CP. ROS is otherwise negative. Vitals, labwork, and pertinent imaging. Exam - NAD, AAO x 4, PERRLA, EOMI, MMM, supple neck, chest - CTA, CV - rrr, s1s2, no m/r/g, abd - soft, NTND, + BS, ext - wwp, psych - normal affect    Plan:  -Patient is medically ready for d/c with close outpatient follow up

## 2024-03-09 NOTE — DISCHARGE NOTE PROVIDER - NSDCACTIVITY_GEN_ALL_CORE
1. What PRN did patient receive? Other ibuprofen    2. What was the patient doing that led to the PRN medication? Pain: headache.     3. Did they require R/S? NO    4. Side effects to PRN medication? None    5. After 1 Hour, patient appeared: Calm and Other Pt stated med helped a little. Pt has been more withdrawn, laying down. Will reassess if needs another PRN.          No heavy lifting/straining

## 2024-03-09 NOTE — DISCHARGE NOTE PROVIDER - CARE PROVIDER_API CALL
Andres Juárez.  Hematology/Oncology  [address]  [place] [address]  Phone: [phone]  Fax: [phone]  Established Patient  Scheduled Appointment: 03/15/2024 08:40 AM

## 2024-03-09 NOTE — DISCHARGE NOTE PROVIDER - HOSPITAL COURSE
#Discharge: do not delete    ANTONY MERCADO is a 43y Female with a past medical history of _____    Presented with _____, found to have _____    Problem List/Main Diagnoses (system-based):   #     #     #    Patient was discharged to: (home/[facility]/acute rehab/hospice, etc. and w/ home health/home PT/RN/home O2)    New medications:   Changes to old medications:  Medications that were stopped:    Items to follow up as outpatient:    Physical exam at the time of discharge:       LABS & STUDIES:   #Discharge: do not delete    ANTONY MERCADO is a 43y Female with a past medical history R MCA ischemic stroke (5/2022), PFO (s/p closure 7/2022), paroxysmal AF (only x1 after PFO closure), antiphospholipid syndrome (on eliquis 5 mg BID) presented for anemia on outpatient labs, found to be anemic 2/2 vaginal bleeding, admitted for transfusions and further management. GYN consulted, no interventions rec'd inpatient but plan to undergo endometrial ablation outpatient before next period. Received 3u pRBCs total and iron sucrose 500 IVPB x1. Hgb 8.9 on day of discharge (post-3u pRBCs).    Problem List/Main Diagnoses (system-based):   #Anemia.   Presented for symptomatic anemia x a few months and low Hgb 6.4 on outpatient labs. Is always anemic 2/2 heavy menstrual bleeding, worsened since being started on eliquis for APLS in 2022. Transfused 3u pRBCs to Hgb. Planned for endometrial ablation as outpatient.    #Gastritis.  Known, continued PPI.    #History of right MCA stroke.   Had stroke 5/2022, found to have PFO and was also diagnosed with APLS. Now s/p PFO closure and on eliquis. No residual deficits from the stroke. Sees hematology provider at [facility]. Will follow up there. Continued home eliquis and atorvastatin.    Patient was discharged to: home    New medications: none  Changes to old medications: none  Medications that were stopped: none    Items to follow up as outpatient:  1. hematology appt re APLS  2. GYN appt for endometrial ablation    Physical exam at the time of discharge:   GENERAL: adult female, slightly pale, otherwise comfortable sitting up in bed  HEENT: NC/AT, EOMI, conjunctival pallor, MMM  NECK: Supple  LUNGS: CTAB  HEART: RRR/borderline bradycardic rate, normal S1/S2, no murmurs appreciated  ABDOMEN: normal bowel sounds, soft nontender nondistended  EXTREMITIES: 2+ radial and DP pulses b/l, no peripheral edema  NERVOUS SYSTEM: A&Ox3, moving all extremities spontaneously #Discharge: do not delete    ANTONY MERCADO is a 43y Female with a past medical history R MCA ischemic stroke (5/2022), PFO (s/p closure 7/2022), paroxysmal AF (only x1 after PFO closure), antiphospholipid syndrome (on eliquis 5 mg BID) presented for anemia on outpatient labs, found to be anemic 2/2 vaginal bleeding, admitted for transfusions and further management. GYN consulted, no interventions rec'd inpatient but plan to undergo endometrial ablation outpatient before next period. Received 3u pRBCs total and iron sucrose 500 IVPB x1. Hgb 8.9 on day of discharge (post-3u pRBCs).    Problem List/Main Diagnoses (system-based):   #Acute blood loss anemia  Presented for symptomatic anemia x a few months and low Hgb 6.4 on outpatient labs. Is always anemic 2/2 heavy menstrual bleeding, worsened since being started on eliquis for APLS in 2022. Transfused 3u pRBCs to Hgb. Planned for endometrial ablation as outpatient.    #Gastritis.  Known, continued PPI.    #History of right MCA stroke.   Had stroke 5/2022, found to have PFO and was also diagnosed with APLS. Now s/p PFO closure and on eliquis. No residual deficits from the stroke. Sees hematology provider at [facility]. Will follow up there. Continued home eliquis and atorvastatin.    Patient was discharged to: home    New medications: none  Changes to old medications: none  Medications that were stopped: none    Items to follow up as outpatient:  1. hematology appt re APLS  2. GYN appt for endometrial ablation    Physical exam at the time of discharge:   GENERAL: adult female, slightly pale, otherwise comfortable sitting up in bed  HEENT: NC/AT, EOMI, conjunctival pallor, MMM  NECK: Supple  LUNGS: CTAB  HEART: RRR/borderline bradycardic rate, normal S1/S2, no murmurs appreciated  ABDOMEN: normal bowel sounds, soft nontender nondistended  EXTREMITIES: 2+ radial and DP pulses b/l, no peripheral edema  NERVOUS SYSTEM: A&Ox3, moving all extremities spontaneously

## 2024-03-10 LAB
CULTURE RESULTS: NO GROWTH — SIGNIFICANT CHANGE UP
SPECIMEN SOURCE: SIGNIFICANT CHANGE UP

## 2024-03-15 ENCOUNTER — OUTPATIENT (OUTPATIENT)
Dept: OUTPATIENT SERVICES | Facility: HOSPITAL | Age: 44
LOS: 1 days | End: 2024-03-15
Payer: COMMERCIAL

## 2024-03-15 ENCOUNTER — APPOINTMENT (OUTPATIENT)
Dept: INFUSION THERAPY | Facility: CLINIC | Age: 44
End: 2024-03-15

## 2024-03-15 ENCOUNTER — APPOINTMENT (OUTPATIENT)
Dept: NEUROLOGY | Facility: CLINIC | Age: 44
End: 2024-03-15
Payer: COMMERCIAL

## 2024-03-15 VITALS
OXYGEN SATURATION: 100 % | DIASTOLIC BLOOD PRESSURE: 63 MMHG | TEMPERATURE: 97.7 F | WEIGHT: 116 LBS | BODY MASS INDEX: 21.35 KG/M2 | SYSTOLIC BLOOD PRESSURE: 99 MMHG | HEART RATE: 63 BPM | HEIGHT: 62 IN

## 2024-03-15 VITALS
WEIGHT: 115.96 LBS | HEART RATE: 70 BPM | TEMPERATURE: 98 F | OXYGEN SATURATION: 99 % | HEIGHT: 62 IN | SYSTOLIC BLOOD PRESSURE: 98 MMHG | RESPIRATION RATE: 18 BRPM | DIASTOLIC BLOOD PRESSURE: 63 MMHG

## 2024-03-15 DIAGNOSIS — D50.9 IRON DEFICIENCY ANEMIA, UNSPECIFIED: ICD-10-CM

## 2024-03-15 DIAGNOSIS — Z87.74 PERSONAL HISTORY OF (CORRECTED) CONGENITAL MALFORMATIONS OF HEART AND CIRCULATORY SYSTEM: Chronic | ICD-10-CM

## 2024-03-15 DIAGNOSIS — Z87.74 PERSONAL HISTORY OF (CORRECTED) CONGENITAL MALFORMATIONS OF HEART AND CIRCULATORY SYSTEM: ICD-10-CM

## 2024-03-15 DIAGNOSIS — Z98.890 OTHER SPECIFIED POSTPROCEDURAL STATES: Chronic | ICD-10-CM

## 2024-03-15 DIAGNOSIS — M25.569 PAIN IN UNSPECIFIED KNEE: Chronic | ICD-10-CM

## 2024-03-15 DIAGNOSIS — I63.411 CEREBRAL INFARCTION DUE TO EMBOLISM OF RIGHT MIDDLE CEREBRAL ARTERY: ICD-10-CM

## 2024-03-15 PROCEDURE — 99214 OFFICE O/P EST MOD 30 MIN: CPT

## 2024-03-15 PROCEDURE — 96365 THER/PROPH/DIAG IV INF INIT: CPT

## 2024-03-15 PROCEDURE — 96366 THER/PROPH/DIAG IV INF ADDON: CPT

## 2024-03-15 RX ORDER — IRON SUCROSE 20 MG/ML
300 INJECTION, SOLUTION INTRAVENOUS ONCE
Refills: 0 | Status: COMPLETED | OUTPATIENT
Start: 2024-03-15 | End: 2024-03-15

## 2024-03-15 RX ADMIN — IRON SUCROSE 300 MILLIGRAM(S): 20 INJECTION, SOLUTION INTRAVENOUS at 11:00

## 2024-03-15 RX ADMIN — IRON SUCROSE 176.67 MILLIGRAM(S): 20 INJECTION, SOLUTION INTRAVENOUS at 09:10

## 2024-03-15 NOTE — ASSESSMENT
[FreeTextEntry1] : Milly Jimenez is a 43 year old female with PMH of APLS (+anti-CL titers), prior R MCA stroke (5/2022), pA fib, s/p PFO closure (7/2022), fibroids s/p myomectomy, hx of menorrhagia (pending ablation) with recent admission (9/2023) for episode of dysarthria with imaging showing no new stroke now presents for presurgical clearance and neurological follow up.   Plan: -Continue Eliquis 5 mg BID for secondary stroke prevention; Hematology in agreement -Continue Atorvastatin 20 mg, LDL goal <70; if continues to have issues with sleep can switch to Crestor 10 mg -Referral placed for new PCP -Counselled on healthy eating (DASH/Mediterranean diet, limiting red meats and fried foods) -Counselled on importance of regular exercise and remaining active -Counselled on f/u with PCP regarding regular health maintenance and prevention, including routine screening -Counselled on signs of stroke BEFAST and to call 911 with any new or worsening neurological symptoms -Cleared for upcoming endometrial ablation with Lovenox bridge; clearance letter with Lovenox instructions will be emailed to patient -RTO in 6 months to check in

## 2024-03-15 NOTE — PHYSICAL EXAM
[FreeTextEntry1] : Alert. Fully oriented. Speech and language are intact. Cranial nerves II-XII are intact. Motor exam reveals intact strength with individual muscle testing in bilateral upper and lower extremities. Gait is normal.

## 2024-03-15 NOTE — HISTORY OF PRESENT ILLNESS
Awake [FreeTextEntry1] : Milly Jimenez is a 43 year old female with PMH of APLS (+anti-CL titers), prior R MCA stroke (5/2022), pA fib, s/p PFO closure (7/2022), fibroids s/p myomectomy, hx of menorrhagia (pending ablation) with recent admission (9/2023) for episode of dysarthria with imaging showing no new stroke now presents for presurgical clearance and neurological follow up.   Since last visit, patient reports no new stroke-like symptoms. She is tolerating her Eliquis and Atorvastatin without myalgias. She is currently scheduled for an endometrial ablation with Dr. Sheppard on 3/26/24. She was recently hospitalized for anemia and received multiple packs of RBCs and iron. She continues to have heavy bleeding with her periods. BP today 99/63. She has also established care with a new Hematologist, Dr. Juárez. She requests medication refills and a surgical clearance letter.

## 2024-03-16 DIAGNOSIS — I48.0 PAROXYSMAL ATRIAL FIBRILLATION: ICD-10-CM

## 2024-03-16 DIAGNOSIS — K29.70 GASTRITIS, UNSPECIFIED, WITHOUT BLEEDING: ICD-10-CM

## 2024-03-16 DIAGNOSIS — N92.0 EXCESSIVE AND FREQUENT MENSTRUATION WITH REGULAR CYCLE: ICD-10-CM

## 2024-03-16 DIAGNOSIS — Z79.01 LONG TERM (CURRENT) USE OF ANTICOAGULANTS: ICD-10-CM

## 2024-03-16 DIAGNOSIS — D62 ACUTE POSTHEMORRHAGIC ANEMIA: ICD-10-CM

## 2024-03-16 DIAGNOSIS — D68.61 ANTIPHOSPHOLIPID SYNDROME: ICD-10-CM

## 2024-03-16 DIAGNOSIS — Z86.73 PERSONAL HISTORY OF TRANSIENT ISCHEMIC ATTACK (TIA), AND CEREBRAL INFARCTION WITHOUT RESIDUAL DEFICITS: ICD-10-CM

## 2024-03-20 ENCOUNTER — TRANSCRIPTION ENCOUNTER (OUTPATIENT)
Age: 44
End: 2024-03-20

## 2024-03-20 RX ORDER — IRON SUCROSE 20 MG/ML
300 INJECTION, SOLUTION INTRAVENOUS ONCE
Refills: 0 | Status: COMPLETED | OUTPATIENT
Start: 2024-03-21 | End: 2024-03-21

## 2024-03-21 ENCOUNTER — OUTPATIENT (OUTPATIENT)
Dept: OUTPATIENT SERVICES | Facility: HOSPITAL | Age: 44
LOS: 1 days | End: 2024-03-21
Payer: COMMERCIAL

## 2024-03-21 ENCOUNTER — APPOINTMENT (OUTPATIENT)
Dept: INFUSION THERAPY | Facility: CLINIC | Age: 44
End: 2024-03-21

## 2024-03-21 VITALS
HEART RATE: 66 BPM | WEIGHT: 115.08 LBS | TEMPERATURE: 99 F | SYSTOLIC BLOOD PRESSURE: 96 MMHG | OXYGEN SATURATION: 99 % | RESPIRATION RATE: 16 BRPM | HEIGHT: 62 IN | DIASTOLIC BLOOD PRESSURE: 62 MMHG

## 2024-03-21 VITALS
SYSTOLIC BLOOD PRESSURE: 95 MMHG | TEMPERATURE: 99 F | DIASTOLIC BLOOD PRESSURE: 57 MMHG | HEART RATE: 57 BPM | OXYGEN SATURATION: 100 % | RESPIRATION RATE: 16 BRPM

## 2024-03-21 DIAGNOSIS — M25.569 PAIN IN UNSPECIFIED KNEE: Chronic | ICD-10-CM

## 2024-03-21 DIAGNOSIS — Z87.74 PERSONAL HISTORY OF (CORRECTED) CONGENITAL MALFORMATIONS OF HEART AND CIRCULATORY SYSTEM: Chronic | ICD-10-CM

## 2024-03-21 DIAGNOSIS — D50.9 IRON DEFICIENCY ANEMIA, UNSPECIFIED: ICD-10-CM

## 2024-03-21 DIAGNOSIS — Z98.890 OTHER SPECIFIED POSTPROCEDURAL STATES: Chronic | ICD-10-CM

## 2024-03-21 PROCEDURE — 96365 THER/PROPH/DIAG IV INF INIT: CPT

## 2024-03-21 RX ADMIN — IRON SUCROSE 300 MILLIGRAM(S): 20 INJECTION, SOLUTION INTRAVENOUS at 13:55

## 2024-03-21 RX ADMIN — IRON SUCROSE 176.67 MILLIGRAM(S): 20 INJECTION, SOLUTION INTRAVENOUS at 12:25

## 2024-03-25 ENCOUNTER — TRANSCRIPTION ENCOUNTER (OUTPATIENT)
Age: 44
End: 2024-03-25

## 2024-03-25 RX ORDER — OMEPRAZOLE 10 MG/1
1 CAPSULE, DELAYED RELEASE ORAL
Refills: 0 | DISCHARGE

## 2024-03-25 NOTE — ASU PATIENT PROFILE, ADULT - NSICDXPASTSURGICALHX_GEN_ALL_CORE_FT
PAST SURGICAL HISTORY:  H/O sinus surgery     History of loop recorder 2022    Knee meniscus pain left 2012    S/P myomectomy 2022    S/P patent foramen ovale closure 2022     PAST SURGICAL HISTORY:  H/O sinus surgery     History of loop recorder 2022  [employer]    Knee meniscus pain left 2012    S/P myomectomy 2022    S/P patent foramen ovale closure 2022

## 2024-03-25 NOTE — PHARMACOTHERAPY INTERVENTION NOTE - COMMENTS
Emailed with Dr. Juárez. "Patient Milly Jimenez (: 1980) mentioned to her infusion nurse that she received Venofer in the hospital. Upon review, she was administered Venofer 500mg on 3/9/2024. The patient received Venofer 300mg here at [facility] on 3/15/2024 and 3/21/2024. This is a total of 1,100mg. The Venofer order we have for her still has two more doses of 300mg. Do you intend for the patient to complete these? Typically, 1000mg would complete the treatment. Please let us know."    Per Dr. Juárez, cancel next two does of Venofer on order. Patient's treatment is complete including the inpatient dose.

## 2024-03-25 NOTE — ASU PATIENT PROFILE, ADULT - ALCOHOL USE HISTORY SINGLE SELECT
March 14, 2018      Khang Montero  [address]  [facility] [address]      To: Khang Montero:    This is to notify you of the results of your recent skin biopsy. The lesion was diagnosed as benign (meaning non-cancerous).  The medical name for this lesion is: Compound melanocytic nevus (mole)    If you should have any further questions regarding your diagnosis and/or wound care, please call the Dermatology Department at [phone].      Sincerely,    MD Maureen Marc APNP Debra Mass, MYESHAN  Hailey Hager, MARU Perez, LUIS ANTONIO Coelho MA   never

## 2024-03-25 NOTE — ASU PATIENT PROFILE, ADULT - FALL HARM RISK - FACTORS NURSING JUDGEMENT
Zuly is a 61 year old who is being evaluated via a billable telephone visit.      What phone number would you like to be contacted at? [phone]  How would you like to obtain your AVS? Mail a copy    Assessment & Plan       ICD-10-CM    1. Vitamin D deficiency  E55.9 vitamin D3 (CHOLECALCIFEROL) 50 mcg (2000 units) tablet   2. Visit for screening mammogram  Z12.31    3. Cervical cancer screening  Z12.4    4. Anxiety state  F41.1    5. Essential hypertension  I10     this is last visit before I leave the clinic, and pt has made much progress on Anxiety and depression, working on self care, recognizes when she needs to seek care earlier. She does have Psych planned to establish with a new Provider and will start with a new Primary Care Provider if she cannot follow me to [facility], discussed her choices    Dizziness only seems with medications, and taking propanolol at night is tolerable, wakes feeling refreshed and BP at goal, continue this plan   Bring details of medications and possible side effects with to Psych, I agree what she is on currently is working well, follow their guidance on if and when to decrease any medications, as severe depression and anxiety history. Keep up the great lifestyle improvements, counseling, and self care       Regular exercise    No follow-ups on file.    ANDREA Maya CNP  [facility]    Subjective   Zuly is a 61 year old, presenting for the following health issues:  WESLEY ALCARAZ     Zuly is having a virtual visit today to follow up from a visit on 7/19.    Prn Atarax made her feel nervous so stopped it after couple doses    Anxiety comes and goes Depends on her thought process. Overall way better than in the past and taking daily meds  Does say now can recognize it earlier and do something to help herself       Dizziness some days--At work when active and going around is when it happens, just feels a bit lightheaded no assoc  symptoms. Wondering if could be her meds she takes in the am  Gabapentin in the morning seems okay  Propanolol stopped am dose for a couple days and didn't feel as dizzy, tolerates it fine at night    BP at home have been at goal, no highs or low numbers and doesn't check when dizzy at work    Sleep amount good, nutrition good, does think maybe it could be she doesn't drink enough water lately when busy  doesn't think it's from panic or anxiety as heart is not racing    Has appointment scheduled at [facility] and [facility] to establish with Psych for oversight of medications      Social History     Tobacco Use     Smoking status: Former Smoker     Years: 30.00     Types: Cigarettes     Quit date: 1990     Years since quittin.7     Smokeless tobacco: Never Used   Vaping Use     Vaping Use: Never used   Substance Use Topics     Alcohol use: Never     Drug use: No     GABRIELE-7 SCORE 2020 2021 3/2/2022   Total Score - - -   Total Score 9 18 5     PHQ 3/31/2021 2021 3/2/2022   PHQ-9 Total Score 23 18 3   Q9: Thoughts of better off dead/self-harm past 2 weeks Several days Not at all Not at all         Review of Systems   Constitutional, HEENT, cardiovascular, pulmonary, GI, , musculoskeletal, neuro, skin, endocrine and psych systems are negative, except as otherwise noted.      Objective           Vitals:  No vitals were obtained today due to virtual visit.    Physical Exam   healthy, alert and no distress  PSYCH: Alert and oriented times 3; coherent speech, normal   rate and volume, able to articulate logical thoughts, able   to abstract reason, no tangential thoughts, no hallucinations   or delusions  Her affect is normal  RESP: No cough, no audible wheezing, able to talk in full sentences  Remainder of exam unable to be completed due to telephone visits    Office Visit on 2022   Component Date Value Ref Range Status     Lithium 2022 0.4    mmol/L Final    Therapeutic: 0.60 - 1.20  mmol/L;  Potentially toxic: >1.50 mmol/L;  Severe toxicity: >2.50 mmol/L    Critical: Greater than 2.00 mmol/L     Sodium 07/19/2022 143  133 - 144 mmol/L Final     Potassium 07/19/2022 4.0  3.4 - 5.3 mmol/L Final     Chloride 07/19/2022 112 (A) 94 - 109 mmol/L Final     Carbon Dioxide (CO2) 07/19/2022 26  20 - 32 mmol/L Final     Anion Gap 07/19/2022 5  3 - 14 mmol/L Final     Urea Nitrogen 07/19/2022 17  7 - 30 mg/dL Final     Creatinine 07/19/2022 0.90  0.52 - 1.04 mg/dL Final     Calcium 07/19/2022 9.1  8.5 - 10.1 mg/dL Final     Glucose 07/19/2022 82  70 - 99 mg/dL Final     Alkaline Phosphatase 07/19/2022 63  40 - 150 U/L Final     AST 07/19/2022 13  0 - 45 U/L Final     ALT 07/19/2022 21  0 - 50 U/L Final     Protein Total 07/19/2022 7.3  6.8 - 8.8 g/dL Final     Albumin 07/19/2022 3.9  3.4 - 5.0 g/dL Final     Bilirubin Total 07/19/2022 0.3  0.2 - 1.3 mg/dL Final     GFR Estimate 07/19/2022 72  >60 mL/min/1.73m2 Final    Effective December 21, 2021 eGFRcr in adults is calculated using the 2021 CKD-EPI creatinine equation which includes age and gender (Kirby et al., NEJM, DOI: 10.1056/PFFUke5180880)     Vitamin D, Total (25-Hydroxy) 07/19/2022 18 (A) 20 - 75 ug/L Final               Phone call duration: 18 minutes    .  ..   No

## 2024-03-25 NOTE — ASU PATIENT PROFILE, ADULT - NSICDXPASTMEDICALHX_GEN_ALL_CORE_FT
PAST MEDICAL HISTORY:  Atrial fibrillation     Cerebrovascular accident (CVA) 2022- no residual    Gastritis

## 2024-03-26 ENCOUNTER — OUTPATIENT (OUTPATIENT)
Dept: OUTPATIENT SERVICES | Facility: HOSPITAL | Age: 44
LOS: 1 days | Discharge: HOME | End: 2024-03-26
Payer: COMMERCIAL

## 2024-03-26 ENCOUNTER — TRANSCRIPTION ENCOUNTER (OUTPATIENT)
Age: 44
End: 2024-03-26

## 2024-03-26 VITALS
RESPIRATION RATE: 16 BRPM | WEIGHT: 119.27 LBS | HEIGHT: 62 IN | HEART RATE: 75 BPM | DIASTOLIC BLOOD PRESSURE: 70 MMHG | TEMPERATURE: 99 F | OXYGEN SATURATION: 99 % | SYSTOLIC BLOOD PRESSURE: 110 MMHG

## 2024-03-26 VITALS
RESPIRATION RATE: 16 BRPM | SYSTOLIC BLOOD PRESSURE: 98 MMHG | HEART RATE: 77 BPM | OXYGEN SATURATION: 97 % | DIASTOLIC BLOOD PRESSURE: 51 MMHG

## 2024-03-26 DIAGNOSIS — Z98.890 OTHER SPECIFIED POSTPROCEDURAL STATES: Chronic | ICD-10-CM

## 2024-03-26 DIAGNOSIS — Z87.74 PERSONAL HISTORY OF (CORRECTED) CONGENITAL MALFORMATIONS OF HEART AND CIRCULATORY SYSTEM: Chronic | ICD-10-CM

## 2024-03-26 DIAGNOSIS — M25.569 PAIN IN UNSPECIFIED KNEE: Chronic | ICD-10-CM

## 2024-03-26 PROCEDURE — 58558 HYSTEROSCOPY BIOPSY: CPT

## 2024-03-26 PROCEDURE — 88305 TISSUE EXAM BY PATHOLOGIST: CPT | Mod: 26

## 2024-03-26 PROCEDURE — 86850 RBC ANTIBODY SCREEN: CPT

## 2024-03-26 PROCEDURE — 86900 BLOOD TYPING SEROLOGIC ABO: CPT

## 2024-03-26 PROCEDURE — 86901 BLOOD TYPING SEROLOGIC RH(D): CPT

## 2024-03-26 PROCEDURE — 88305 TISSUE EXAM BY PATHOLOGIST: CPT

## 2024-03-26 PROCEDURE — C1889: CPT

## 2024-03-26 DEVICE — NOVASURE KIT DISP: Type: IMPLANTABLE DEVICE | Status: FUNCTIONAL

## 2024-03-26 RX ORDER — ACETAMINOPHEN 500 MG
2 TABLET ORAL
Qty: 0 | Refills: 0 | DISCHARGE
Start: 2024-03-26

## 2024-03-26 RX ORDER — ACETAMINOPHEN 500 MG
1000 TABLET ORAL EVERY 6 HOURS
Refills: 0 | Status: DISCONTINUED | OUTPATIENT
Start: 2024-03-26 | End: 2024-03-26

## 2024-03-26 RX ORDER — ATORVASTATIN CALCIUM 80 MG/1
1 TABLET, FILM COATED ORAL
Qty: 0 | Refills: 0 | DISCHARGE

## 2024-03-26 RX ORDER — APIXABAN 2.5 MG/1
5 TABLET, FILM COATED ORAL
Refills: 0 | DISCHARGE

## 2024-03-26 RX ORDER — ONDANSETRON 8 MG/1
8 TABLET, FILM COATED ORAL ONCE
Refills: 0 | Status: COMPLETED | OUTPATIENT
Start: 2024-03-26 | End: 2024-03-26

## 2024-03-26 RX ORDER — HYDROMORPHONE HYDROCHLORIDE 2 MG/ML
0.2 INJECTION INTRAMUSCULAR; INTRAVENOUS; SUBCUTANEOUS
Refills: 0 | Status: COMPLETED | OUTPATIENT
Start: 2024-03-26 | End: 2024-03-26

## 2024-03-26 RX ORDER — ACETAMINOPHEN 500 MG
1000 TABLET ORAL ONCE
Refills: 0 | Status: COMPLETED | OUTPATIENT
Start: 2024-03-26 | End: 2024-03-26

## 2024-03-26 RX ORDER — OXYCODONE HYDROCHLORIDE 5 MG/1
5 TABLET ORAL
Refills: 0 | Status: DISCONTINUED | OUTPATIENT
Start: 2024-03-26 | End: 2024-03-26

## 2024-03-26 RX ORDER — METOCLOPRAMIDE HCL 10 MG
10 TABLET ORAL ONCE
Refills: 0 | Status: COMPLETED | OUTPATIENT
Start: 2024-03-26 | End: 2024-03-26

## 2024-03-26 RX ORDER — KETOROLAC TROMETHAMINE 30 MG/ML
30 SYRINGE (ML) INJECTION EVERY 8 HOURS
Refills: 0 | Status: DISCONTINUED | OUTPATIENT
Start: 2024-03-26 | End: 2024-03-26

## 2024-03-26 RX ORDER — OMEPRAZOLE 10 MG/1
1 CAPSULE, DELAYED RELEASE ORAL
Refills: 0 | DISCHARGE

## 2024-03-26 RX ORDER — SODIUM CHLORIDE 9 MG/ML
1000 INJECTION, SOLUTION INTRAVENOUS
Refills: 0 | Status: DISCONTINUED | OUTPATIENT
Start: 2024-03-26 | End: 2024-03-26

## 2024-03-26 RX ORDER — SODIUM CHLORIDE 9 MG/ML
500 INJECTION, SOLUTION INTRAVENOUS ONCE
Refills: 0 | Status: COMPLETED | OUTPATIENT
Start: 2024-03-26 | End: 2024-03-26

## 2024-03-26 RX ORDER — ENOXAPARIN SODIUM 100 MG/ML
80 INJECTION SUBCUTANEOUS
Refills: 0 | DISCHARGE

## 2024-03-26 RX ADMIN — Medication 10 MILLIGRAM(S): at 18:16

## 2024-03-26 RX ADMIN — SODIUM CHLORIDE 1000 MILLILITER(S): 9 INJECTION, SOLUTION INTRAVENOUS at 17:00

## 2024-03-26 RX ADMIN — ONDANSETRON 4 MILLIGRAM(S): 8 TABLET, FILM COATED ORAL at 16:41

## 2024-03-26 RX ADMIN — OXYCODONE HYDROCHLORIDE 5 MILLIGRAM(S): 5 TABLET ORAL at 15:37

## 2024-03-26 RX ADMIN — Medication 400 MILLIGRAM(S): at 14:25

## 2024-03-26 RX ADMIN — HYDROMORPHONE HYDROCHLORIDE 0.2 MILLIGRAM(S): 2 INJECTION INTRAMUSCULAR; INTRAVENOUS; SUBCUTANEOUS at 14:09

## 2024-03-26 RX ADMIN — HYDROMORPHONE HYDROCHLORIDE 0.2 MILLIGRAM(S): 2 INJECTION INTRAMUSCULAR; INTRAVENOUS; SUBCUTANEOUS at 14:33

## 2024-03-26 RX ADMIN — HYDROMORPHONE HYDROCHLORIDE 0.2 MILLIGRAM(S): 2 INJECTION INTRAMUSCULAR; INTRAVENOUS; SUBCUTANEOUS at 15:07

## 2024-03-26 NOTE — ASU DISCHARGE PLAN (ADULT/PEDIATRIC) - NS MD DC FALL RISK RISK
For information on Fall & Injury Prevention, visit: [email or web address] OR  [email or web address] OR  https://www.cdc.gov/steadi/patient.html

## 2024-03-26 NOTE — ASU PREOP CHECKLIST - IDENTIFICATION BAND VERIFIED
Pt presents to clinic today for foul smelling discharge, burning with urination and discomfort with sex. Patient states this started 3 days ago.       FOOD SECURITY SCREENING QUESTIONS:    The next two questions are to help us understand your food security.  If you are feeling you need any assistance in this area, we have resources available to support you today.    Hunger Vital Signs:  Within the past 12 months we worried whether our food would run out before we got money to buy more. Never  Within the past 12 months the food we bought just didn't last and we didn't have money to get more. Never          Medication Reconciliation: Trung Oleary, WENDY,LPN on 4/27/2022 at 11:39 AM   
done

## 2024-03-26 NOTE — BRIEF OPERATIVE NOTE - NSICDXBRIEFPROCEDURE_GEN_ALL_CORE_FT
PROCEDURES:  Hysteroscopy, with dilation and curettage, and endometrial ablation 26-Mar-2024 12:26:38  Ronnell Tilley   PROCEDURES:  Ablation, endometrial, thermal 26-Mar-2024 13:28:18  Ronnell Tilley   PROCEDURES:  Ablation, endometrial, thermal 26-Mar-2024 13:28:18  Ronnell Tilley  D&C (dilatation and curettage, scraping of uterus) 26-Mar-2024 13:32:45  Ronnell Tilley

## 2024-03-26 NOTE — BRIEF OPERATIVE NOTE - OPERATION/FINDINGS
Normal external genitalia and vaginal mucosa. Cervix serially dilated to #18 dilator. Sharp curettage performed. Difficulty obtaining seal around cervix so purse string suture placed w/ 0 vicryl. Endometrial ablation successfully performed.

## 2024-03-26 NOTE — ASU DISCHARGE PLAN (ADULT/PEDIATRIC) - CARE PROVIDER_API CALL
Sherrie Sheppard  Obstetrics and Gynecology  [address] [place] [address], [address]  [place] [address]  Phone: [phone]  Fax: [phone]  Established Patient  Follow Up Time:

## 2024-03-26 NOTE — BRIEF OPERATIVE NOTE - FIRST ASSIST
Goal Outcome Evaluation:   VSS on room air.  Tolerating diabetic diet.  Alert and oriented x4.  Lactulose given as ordered, no stool this shift.  Steady, but weak gait with stand by assist and gait belt. Bed and chair alarm utilized for safety.                       Resident/Fellow

## 2024-03-27 PROBLEM — R76.0 RAISED ANTIBODY TITER: Chronic | Status: ACTIVE | Noted: 2024-03-26

## 2024-03-28 LAB — SURGICAL PATHOLOGY STUDY: SIGNIFICANT CHANGE UP

## 2024-03-29 ENCOUNTER — APPOINTMENT (OUTPATIENT)
Dept: INFUSION THERAPY | Facility: CLINIC | Age: 44
End: 2024-03-29

## 2024-04-05 ENCOUNTER — APPOINTMENT (OUTPATIENT)
Dept: INFUSION THERAPY | Facility: CLINIC | Age: 44
End: 2024-04-05

## 2024-04-09 PROBLEM — D68.61 ANTIPHOSPHOLIPID ANTIBODY SYNDROME: Status: ACTIVE | Noted: 2024-03-08

## 2024-04-09 PROBLEM — D50.9 ANEMIA, IRON DEFICIENCY: Status: ACTIVE | Noted: 2022-06-23

## 2024-04-11 ENCOUNTER — NON-APPOINTMENT (OUTPATIENT)
Age: 44
End: 2024-04-11

## 2024-04-11 ENCOUNTER — LABORATORY RESULT (OUTPATIENT)
Age: 44
End: 2024-04-11

## 2024-04-11 ENCOUNTER — APPOINTMENT (OUTPATIENT)
Dept: HEMATOLOGY ONCOLOGY | Facility: CLINIC | Age: 44
End: 2024-04-11
Payer: COMMERCIAL

## 2024-04-11 ENCOUNTER — APPOINTMENT (OUTPATIENT)
Dept: NEUROLOGY | Facility: CLINIC | Age: 44
End: 2024-04-11

## 2024-04-11 VITALS
DIASTOLIC BLOOD PRESSURE: 65 MMHG | WEIGHT: 114 LBS | RESPIRATION RATE: 18 BRPM | HEIGHT: 62 IN | TEMPERATURE: 98.6 F | BODY MASS INDEX: 20.98 KG/M2 | OXYGEN SATURATION: 100 % | HEART RATE: 62 BPM | SYSTOLIC BLOOD PRESSURE: 101 MMHG

## 2024-04-11 DIAGNOSIS — D68.61 ANTIPHOSPHOLIPID SYNDROME: ICD-10-CM

## 2024-04-11 DIAGNOSIS — D50.9 IRON DEFICIENCY ANEMIA, UNSPECIFIED: ICD-10-CM

## 2024-04-11 PROBLEM — I63.9 CEREBRAL INFARCTION, UNSPECIFIED: Chronic | Status: ACTIVE | Noted: 2022-06-22

## 2024-04-11 LAB
FERRITIN SERPL-MCNC: 147 NG/ML
IRON SATN MFR SERPL: 38 %
IRON SERPL-MCNC: 108 UG/DL
RBC # BLD: 4.73 M/UL
RETICS # AUTO: 0.8 %
RETICS AGGREG/RBC NFR: 37.8 K/UL
TIBC SERPL-MCNC: 288 UG/DL
UIBC SERPL-MCNC: 180 UG/DL

## 2024-04-11 PROCEDURE — 99213 OFFICE O/P EST LOW 20 MIN: CPT

## 2024-04-11 RX ORDER — APIXABAN 5 MG/1
5 TABLET, FILM COATED ORAL
Qty: 180 | Refills: 3 | Status: ACTIVE | COMMUNITY
Start: 2022-06-15

## 2024-04-11 RX ORDER — METOPROLOL SUCCINATE 25 MG/1
25 TABLET, EXTENDED RELEASE ORAL
Refills: 0 | Status: ACTIVE | COMMUNITY

## 2024-04-11 RX ORDER — ATORVASTATIN CALCIUM 20 MG/1
20 TABLET, FILM COATED ORAL
Qty: 90 | Refills: 1 | Status: ACTIVE | COMMUNITY

## 2024-04-11 RX ORDER — ENOXAPARIN SODIUM 80 MG/.8ML
80 INJECTION, SOLUTION SUBCUTANEOUS DAILY
Qty: 3 | Refills: 0 | Status: COMPLETED | COMMUNITY
Start: 2023-05-12 | End: 2024-04-11

## 2024-04-11 RX ORDER — OMEPRAZOLE 20 MG/1
20 CAPSULE, DELAYED RELEASE ORAL
Refills: 0 | Status: ACTIVE | COMMUNITY

## 2024-04-11 NOTE — REVIEW OF SYSTEMS
[Fatigue] : fatigue [Palpitations] : palpitations [Insomnia] : insomnia [FreeTextEntry2] : brain fog [Dizziness] : dizziness [Negative] : Cardiovascular [FreeTextEntry4] : Tinnitus remains, but not pulsatile any longer. [FreeTextEntry8] : endometrial ablation in [facility]

## 2024-04-11 NOTE — HISTORY OF PRESENT ILLNESS
[de-identified] : Milly Jimenez is a 43-year-old female who presents to the clinic for initial consultation for anemia.  The patient has a history of APLS which was diagnosed after he developed a stroke in 2022.  During her hospital stay she was found to have positive anticardiolipin antibody which remained persistently in subsequent labs.  Patient has been offered treatment with warfarin and Lovenox but is preferred to remain on Eliquis given the ease of administration.  She was hospitalized again in September 2023 for concern for TIA, but ultimately after assessment by neurology was felt not to have suffered such an event.  She was therefore continued on Eliquis, which she continues to take.   In addition to this history, the patient suffers from iron deficiency anemia.  This is felt to be due to her very heavy menstruation-she states that her period each month associated with 7 or 8 days of "debilitating" bleeding.  She also reports generalized fatigue and decreased exercise tolerance.  She is considering endometrial ablation with GYN.    [de-identified] : Patient is here today for a follow up today. Patient was admitted on 3/8/2024 and received 3 units of PRBCS and 500 mg of IV iron. Patient received the endometrial ablation at the end of March 2024.  S/p procedure patient had her menstrual period, she states that there were some blood clots, and reports that she did not need to wear two menstrual pads at the same time. In addition, she continues had some spotting s/p procedure. She is scheduled to follow up with her Ob/Gyn later this afternoon.  Patient followed up with Neurology on 3/15/2024, saw them for clearance for her endometrial ablation.  Overall, patient reports that her lips are pink, tinnitus has resolved, and ice cravings have resolved, and she has more energy. She reports some intermittent dizziness, she denies the room spinning, lightheadedness. She reports that it does intermittently dissipate with Tylenol use.

## 2024-04-11 NOTE — ASSESSMENT
[FreeTextEntry1] : Ms. NENITA Jimenez is here today due to h.o APLS and iron deficiency anemia.  Anemia - Workup in March 2024 was consistent with iron deficiency anemia.  - Patient received 2 - 300 mg IV Venofer and received an additional 500 mg of IV Iron while admitted. In addition, patient received 3 units of PRBCs. - Patient is s/p endometrial ablation at the end of March.  - I will recheck CBC and Iron studies today.  Antiphosolipid Syndrome - Patient meets laboratory criteria for APLS with persistently elevated anticardiolipin     antibody and meets clinical criteria history of stroke in 2022.  - During our last visit, we discussed that the standard of care in this situation is warfarin, however the patient has been taking Eliquis for several years and prefers the medication due to its ease of administration and lack of dietary restrictions. She understands that warfarin is felt to be somewhat more effective in this situation, however this effect is felt to be most strong in triple positive patients, where she only has positivity of anticardiolipin antibody.  - Continue anticoagulation therapy.    We will see patient back in 3 months.

## 2024-04-11 NOTE — PHYSICAL EXAM
[Fully active, able to carry on all pre-disease performance without restriction] : Status 0 - Fully active, able to carry on all pre-disease performance without restriction [Normal] : RRR, normal S1S2, no murmurs, rubs, gallops [de-identified] : Normal work of breathing on room air.  Speaking full sentences normal respiratory rate on room air.  Speaking full sentences without increased work of breathing [de-identified] : Soft, non-tender upon palpation

## 2024-04-15 ENCOUNTER — TRANSCRIPTION ENCOUNTER (OUTPATIENT)
Age: 44
End: 2024-04-15

## 2024-04-15 ENCOUNTER — NON-APPOINTMENT (OUTPATIENT)
Age: 44
End: 2024-04-15

## 2024-04-15 LAB
ALBUMIN SERPL ELPH-MCNC: 3.8 G/DL
ALP BLD-CCNC: 44 U/L
ALT SERPL-CCNC: 24 U/L
ANION GAP SERPL CALC-SCNC: 0 MMOL/L
AST SERPL-CCNC: 22 U/L
BILIRUB SERPL-MCNC: 0.9 MG/DL
BUN SERPL-MCNC: 16 MG/DL
CALCIUM SERPL-MCNC: 9.8 MG/DL
CHLORIDE SERPL-SCNC: 109 MMOL/L
CO2 SERPL-SCNC: 29 MMOL/L
CREAT SERPL-MCNC: 0.8 MG/DL
EGFR: 93 ML/MIN/1.73M2
FOLATE SERPL-MCNC: 4.3 NG/ML
GLUCOSE SERPL-MCNC: 93 MG/DL
POTASSIUM SERPL-SCNC: 4.8 MMOL/L
PROT SERPL-MCNC: 7.2 G/DL
SODIUM SERPL-SCNC: 138 MMOL/L
VIT B12 SERPL-MCNC: 815 PG/ML

## 2024-07-11 ENCOUNTER — APPOINTMENT (OUTPATIENT)
Dept: HEMATOLOGY ONCOLOGY | Facility: CLINIC | Age: 44
End: 2024-07-11
Payer: COMMERCIAL

## 2024-07-11 VITALS
HEART RATE: 62 BPM | TEMPERATURE: 98.9 F | SYSTOLIC BLOOD PRESSURE: 103 MMHG | HEIGHT: 62 IN | DIASTOLIC BLOOD PRESSURE: 69 MMHG | WEIGHT: 117 LBS | RESPIRATION RATE: 18 BRPM | OXYGEN SATURATION: 98 % | BODY MASS INDEX: 21.53 KG/M2

## 2024-07-11 DIAGNOSIS — D68.61 ANTIPHOSPHOLIPID SYNDROME: ICD-10-CM

## 2024-07-11 DIAGNOSIS — E53.8 DEFICIENCY OF OTHER SPECIFIED B GROUP VITAMINS: ICD-10-CM

## 2024-07-11 DIAGNOSIS — D50.9 IRON DEFICIENCY ANEMIA, UNSPECIFIED: ICD-10-CM

## 2024-07-11 LAB
FERRITIN SERPL-MCNC: 58 NG/ML
IRON SATN MFR SERPL: 57 %
IRON SERPL-MCNC: 170 UG/DL
RETICS # AUTO: 1.2 %
RETICS AGGREG/RBC NFR: 49.3 K/UL
TIBC SERPL-MCNC: 297 UG/DL
UIBC SERPL-MCNC: 127 UG/DL

## 2024-07-11 PROCEDURE — 99213 OFFICE O/P EST LOW 20 MIN: CPT

## 2024-07-13 ENCOUNTER — NON-APPOINTMENT (OUTPATIENT)
Age: 44
End: 2024-07-13

## 2024-07-13 LAB
ALBUMIN SERPL ELPH-MCNC: 3.9 G/DL
ALP BLD-CCNC: 41 U/L
ALT SERPL-CCNC: 29 U/L
ANION GAP SERPL CALC-SCNC: 3 MMOL/L
BILIRUB SERPL-MCNC: 0.9 MG/DL
BUN SERPL-MCNC: 14 MG/DL
CALCIUM SERPL-MCNC: 9.9 MG/DL
CHLORIDE SERPL-SCNC: 108 MMOL/L
CO2 SERPL-SCNC: 27 MMOL/L
CREAT SERPL-MCNC: 0.9 MG/DL
EGFR: 81 ML/MIN/1.73M2
FOLATE SERPL-MCNC: 7.1 NG/ML
GLUCOSE SERPL-MCNC: 83 MG/DL
POTASSIUM SERPL-SCNC: 4.2 MMOL/L
PROT SERPL-MCNC: 7.4 G/DL
SODIUM SERPL-SCNC: 138 MMOL/L
VIT B12 SERPL-MCNC: 689 PG/ML

## 2024-07-22 ENCOUNTER — TRANSCRIPTION ENCOUNTER (OUTPATIENT)
Age: 44
End: 2024-07-22

## 2024-08-05 ENCOUNTER — TRANSCRIPTION ENCOUNTER (OUTPATIENT)
Age: 44
End: 2024-08-05

## 2024-08-06 ENCOUNTER — NON-APPOINTMENT (OUTPATIENT)
Age: 44
End: 2024-08-06

## 2024-08-06 ENCOUNTER — TRANSCRIPTION ENCOUNTER (OUTPATIENT)
Age: 44
End: 2024-08-06

## 2024-10-08 ENCOUNTER — APPOINTMENT (OUTPATIENT)
Dept: HEMATOLOGY ONCOLOGY | Facility: CLINIC | Age: 44
End: 2024-10-08

## 2024-10-18 ENCOUNTER — APPOINTMENT (OUTPATIENT)
Dept: HEMATOLOGY ONCOLOGY | Facility: CLINIC | Age: 44
End: 2024-10-18
Payer: COMMERCIAL

## 2024-10-18 VITALS
RESPIRATION RATE: 18 BRPM | WEIGHT: 117 LBS | OXYGEN SATURATION: 99 % | BODY MASS INDEX: 21.53 KG/M2 | HEART RATE: 63 BPM | HEIGHT: 62 IN | TEMPERATURE: 97.9 F

## 2024-10-18 VITALS — SYSTOLIC BLOOD PRESSURE: 108 MMHG | DIASTOLIC BLOOD PRESSURE: 71 MMHG

## 2024-10-18 DIAGNOSIS — D50.9 IRON DEFICIENCY ANEMIA, UNSPECIFIED: ICD-10-CM

## 2024-10-18 DIAGNOSIS — D68.61 ANTIPHOSPHOLIPID SYNDROME: ICD-10-CM

## 2024-10-18 PROCEDURE — 99214 OFFICE O/P EST MOD 30 MIN: CPT

## 2024-10-18 PROCEDURE — 36415 COLL VENOUS BLD VENIPUNCTURE: CPT

## 2024-10-18 PROCEDURE — G2211 COMPLEX E/M VISIT ADD ON: CPT | Mod: NC

## 2024-10-18 RX ORDER — COLD-HOT PACK
125 MCG EACH MISCELLANEOUS
Refills: 0 | Status: ACTIVE | COMMUNITY
Start: 2024-10-18

## 2024-10-22 ENCOUNTER — APPOINTMENT (OUTPATIENT)
Dept: NEUROLOGY | Facility: CLINIC | Age: 44
End: 2024-10-22

## 2024-10-22 VITALS
HEIGHT: 62 IN | TEMPERATURE: 98.4 F | SYSTOLIC BLOOD PRESSURE: 100 MMHG | HEART RATE: 78 BPM | OXYGEN SATURATION: 100 % | WEIGHT: 114 LBS | BODY MASS INDEX: 20.98 KG/M2 | DIASTOLIC BLOOD PRESSURE: 67 MMHG

## 2024-10-22 LAB
FERRITIN SERPL-MCNC: 29 NG/ML
HCT VFR BLD CALC: 40 %
HGB BLD-MCNC: 12.9 G/DL
IRON SATN MFR SERPL: 30 %
IRON SERPL-MCNC: 93 UG/DL
LYMPHOCYTES # BLD AUTO: 1.7 K/UL
LYMPHOCYTES NFR BLD AUTO: 28.8 %
MAN DIFF?: NO
MCHC RBC-ENTMCNC: 29.5 PG
MCHC RBC-ENTMCNC: 32.3 GM/DL
MCV RBC AUTO: 91.5 FL
NEUTROPHILS # BLD AUTO: 3.5 K/UL
NEUTROPHILS NFR BLD AUTO: 60.9 %
PLATELET # BLD AUTO: 214 K/UL
RBC # BLD: 4.37 M/UL
RBC # FLD: 13.6 %
TIBC SERPL-MCNC: 312 UG/DL
UIBC SERPL-MCNC: 219 UG/DL
WBC # FLD AUTO: 5.8 K/UL

## 2024-10-22 PROCEDURE — 99214 OFFICE O/P EST MOD 30 MIN: CPT

## 2024-10-22 RX ORDER — UBIDECARENONE 200 MG
400 CAPSULE ORAL DAILY
Refills: 0 | Status: ACTIVE | COMMUNITY

## 2024-11-06 ENCOUNTER — APPOINTMENT (OUTPATIENT)
Dept: NEUROLOGY | Facility: CLINIC | Age: 44
End: 2024-11-06

## 2024-11-06 PROCEDURE — 95819 EEG AWAKE AND ASLEEP: CPT

## 2024-11-07 ENCOUNTER — APPOINTMENT (OUTPATIENT)
Dept: NEUROLOGY | Facility: CLINIC | Age: 44
End: 2024-11-07

## 2024-11-07 PROCEDURE — 95700 EEG CONT REC W/VID EEG TECH: CPT

## 2024-11-07 PROCEDURE — 95708 EEG WO VID EA 12-26HR UNMNTR: CPT

## 2024-11-07 PROCEDURE — 95719 EEG PHYS/QHP EA INCR W/O VID: CPT

## 2024-11-25 ENCOUNTER — TRANSCRIPTION ENCOUNTER (OUTPATIENT)
Age: 44
End: 2024-11-25

## 2024-12-27 ENCOUNTER — LABORATORY RESULT (OUTPATIENT)
Age: 44
End: 2024-12-27

## 2024-12-27 ENCOUNTER — NON-APPOINTMENT (OUTPATIENT)
Age: 44
End: 2024-12-27

## 2024-12-27 DIAGNOSIS — D50.9 IRON DEFICIENCY ANEMIA, UNSPECIFIED: ICD-10-CM

## 2024-12-28 ENCOUNTER — TRANSCRIPTION ENCOUNTER (OUTPATIENT)
Age: 44
End: 2024-12-28

## 2024-12-28 ENCOUNTER — NON-APPOINTMENT (OUTPATIENT)
Age: 44
End: 2024-12-28

## 2024-12-28 LAB
BASOPHILS # BLD AUTO: 0.03 K/UL
BASOPHILS NFR BLD AUTO: 0.5 %
EOSINOPHIL # BLD AUTO: 0.19 K/UL
EOSINOPHIL NFR BLD AUTO: 3 %
FERRITIN SERPL-MCNC: 13 NG/ML
FOLATE SERPL-MCNC: 2.8 NG/ML
HCT VFR BLD CALC: 36.6 %
HGB BLD-MCNC: 11.8 G/DL
IMM GRANULOCYTES NFR BLD AUTO: 0.3 %
IRON SATN MFR SERPL: 11 %
IRON SERPL-MCNC: 42 UG/DL
LYMPHOCYTES # BLD AUTO: 1.17 K/UL
LYMPHOCYTES NFR BLD AUTO: 18.8 %
MAN DIFF?: NORMAL
MCHC RBC-ENTMCNC: 29.2 PG
MCHC RBC-ENTMCNC: 32.2 G/DL
MCV RBC AUTO: 90.6 FL
MONOCYTES # BLD AUTO: 0.35 K/UL
MONOCYTES NFR BLD AUTO: 5.6 %
NEUTROPHILS # BLD AUTO: 4.47 K/UL
NEUTROPHILS NFR BLD AUTO: 71.8 %
PLATELET # BLD AUTO: 234 K/UL
RBC # BLD: 4.04 M/UL
RBC # FLD: 13.4 %
TIBC SERPL-MCNC: 371 UG/DL
UIBC SERPL-MCNC: 329 UG/DL
VIT B12 SERPL-MCNC: 687 PG/ML
WBC # FLD AUTO: 6.23 K/UL

## 2024-12-29 LAB
ABO + RH PNL BLD: NORMAL
ALBUMIN SERPL ELPH-MCNC: 4.5 G/DL
ALP BLD-CCNC: 59 U/L
ALT SERPL-CCNC: 16 U/L
ANION GAP SERPL CALC-SCNC: 12 MMOL/L
AST SERPL-CCNC: 24 U/L
BILIRUB SERPL-MCNC: 0.3 MG/DL
BUN SERPL-MCNC: 15 MG/DL
CALCIUM SERPL-MCNC: 9.3 MG/DL
CHLORIDE SERPL-SCNC: 101 MMOL/L
CO2 SERPL-SCNC: 24 MMOL/L
CREAT SERPL-MCNC: 0.78 MG/DL
EGFR: 95 ML/MIN/1.73M2
GLUCOSE SERPL-MCNC: 97 MG/DL
POTASSIUM SERPL-SCNC: 4.3 MMOL/L
PROT SERPL-MCNC: 7.5 G/DL
SODIUM SERPL-SCNC: 138 MMOL/L

## 2025-01-08 ENCOUNTER — APPOINTMENT (OUTPATIENT)
Dept: OPHTHALMOLOGY | Facility: CLINIC | Age: 45
End: 2025-01-08
Payer: COMMERCIAL

## 2025-01-08 ENCOUNTER — NON-APPOINTMENT (OUTPATIENT)
Age: 45
End: 2025-01-08

## 2025-01-08 PROCEDURE — 92285 EXTERNAL OCULAR PHOTOGRAPHY: CPT

## 2025-01-08 PROCEDURE — 99204 OFFICE O/P NEW MOD 45 MIN: CPT

## 2025-01-17 ENCOUNTER — APPOINTMENT (OUTPATIENT)
Dept: HEMATOLOGY ONCOLOGY | Facility: CLINIC | Age: 45
End: 2025-01-17

## 2025-01-17 VITALS
WEIGHT: 115 LBS | DIASTOLIC BLOOD PRESSURE: 72 MMHG | SYSTOLIC BLOOD PRESSURE: 110 MMHG | HEART RATE: 68 BPM | BODY MASS INDEX: 21.16 KG/M2 | HEIGHT: 62 IN | OXYGEN SATURATION: 100 % | RESPIRATION RATE: 18 BRPM

## 2025-01-17 PROCEDURE — 99214 OFFICE O/P EST MOD 30 MIN: CPT

## 2025-01-17 RX ORDER — LOTILANER OPHTHALMIC SOLUTION 2.5 MG/ML
0.25 SOLUTION/ DROPS OPHTHALMIC
Refills: 0 | Status: COMPLETED | OUTPATIENT
Start: 2025-01-17

## 2025-01-17 RX ORDER — CHLORHEXIDINE GLUCONATE 4 %
325 (65 FE) LIQUID (ML) TOPICAL DAILY
Refills: 0 | Status: ACTIVE | COMMUNITY
Start: 2025-01-17

## 2025-01-17 RX ADMIN — LOTILANER OPHTHALMIC SOLUTION %: 2.5 SOLUTION/ DROPS OPHTHALMIC at 00:00

## 2025-01-23 ENCOUNTER — APPOINTMENT (OUTPATIENT)
Dept: INFUSION THERAPY | Facility: CLINIC | Age: 45
End: 2025-01-23

## 2025-01-23 ENCOUNTER — OUTPATIENT (OUTPATIENT)
Dept: OUTPATIENT SERVICES | Facility: HOSPITAL | Age: 45
LOS: 1 days | End: 2025-01-23
Payer: COMMERCIAL

## 2025-01-23 VITALS
HEIGHT: 63 IN | SYSTOLIC BLOOD PRESSURE: 106 MMHG | RESPIRATION RATE: 16 BRPM | WEIGHT: 115.96 LBS | TEMPERATURE: 98 F | OXYGEN SATURATION: 99 % | DIASTOLIC BLOOD PRESSURE: 72 MMHG | HEART RATE: 68 BPM

## 2025-01-23 DIAGNOSIS — M25.569 PAIN IN UNSPECIFIED KNEE: Chronic | ICD-10-CM

## 2025-01-23 DIAGNOSIS — D50.9 IRON DEFICIENCY ANEMIA, UNSPECIFIED: ICD-10-CM

## 2025-01-23 DIAGNOSIS — Z98.890 OTHER SPECIFIED POSTPROCEDURAL STATES: Chronic | ICD-10-CM

## 2025-01-23 DIAGNOSIS — Z87.74 PERSONAL HISTORY OF (CORRECTED) CONGENITAL MALFORMATIONS OF HEART AND CIRCULATORY SYSTEM: Chronic | ICD-10-CM

## 2025-01-23 PROCEDURE — 96365 THER/PROPH/DIAG IV INF INIT: CPT

## 2025-01-23 RX ORDER — IRON SUCROSE 20 MG/ML
200 INJECTION, SOLUTION INTRAVENOUS ONCE
Refills: 0 | Status: COMPLETED | OUTPATIENT
Start: 2025-01-23 | End: 2025-01-23

## 2025-01-23 RX ADMIN — IRON SUCROSE 200 MILLIGRAM(S): 20 INJECTION, SOLUTION INTRAVENOUS at 12:48

## 2025-01-23 RX ADMIN — IRON SUCROSE 220 MILLIGRAM(S): 20 INJECTION, SOLUTION INTRAVENOUS at 12:16

## 2025-01-30 ENCOUNTER — OUTPATIENT (OUTPATIENT)
Dept: OUTPATIENT SERVICES | Facility: HOSPITAL | Age: 45
LOS: 1 days | End: 2025-01-30
Payer: COMMERCIAL

## 2025-01-30 ENCOUNTER — APPOINTMENT (OUTPATIENT)
Dept: INFUSION THERAPY | Facility: CLINIC | Age: 45
End: 2025-01-30

## 2025-01-30 VITALS
HEIGHT: 62 IN | RESPIRATION RATE: 16 BRPM | OXYGEN SATURATION: 99 % | SYSTOLIC BLOOD PRESSURE: 109 MMHG | HEART RATE: 59 BPM | TEMPERATURE: 98 F | DIASTOLIC BLOOD PRESSURE: 72 MMHG | WEIGHT: 115.96 LBS

## 2025-01-30 VITALS
TEMPERATURE: 98 F | SYSTOLIC BLOOD PRESSURE: 97 MMHG | OXYGEN SATURATION: 97 % | HEART RATE: 62 BPM | DIASTOLIC BLOOD PRESSURE: 61 MMHG | RESPIRATION RATE: 16 BRPM

## 2025-01-30 DIAGNOSIS — Z98.890 OTHER SPECIFIED POSTPROCEDURAL STATES: Chronic | ICD-10-CM

## 2025-01-30 DIAGNOSIS — D50.9 IRON DEFICIENCY ANEMIA, UNSPECIFIED: ICD-10-CM

## 2025-01-30 DIAGNOSIS — M25.569 PAIN IN UNSPECIFIED KNEE: Chronic | ICD-10-CM

## 2025-01-30 DIAGNOSIS — Z87.74 PERSONAL HISTORY OF (CORRECTED) CONGENITAL MALFORMATIONS OF HEART AND CIRCULATORY SYSTEM: Chronic | ICD-10-CM

## 2025-01-30 PROCEDURE — 96365 THER/PROPH/DIAG IV INF INIT: CPT

## 2025-01-30 RX ORDER — IRON SUCROSE 20 MG/ML
200 INJECTION, SOLUTION INTRAVENOUS ONCE
Refills: 0 | Status: COMPLETED | OUTPATIENT
Start: 2025-01-30 | End: 2025-01-30

## 2025-01-30 RX ADMIN — IRON SUCROSE 220 MILLIGRAM(S): 20 INJECTION, SOLUTION INTRAVENOUS at 11:27

## 2025-01-30 RX ADMIN — IRON SUCROSE 200 MILLIGRAM(S): 20 INJECTION, SOLUTION INTRAVENOUS at 11:57

## 2025-02-04 ENCOUNTER — TRANSCRIPTION ENCOUNTER (OUTPATIENT)
Age: 45
End: 2025-02-04

## 2025-02-06 ENCOUNTER — OUTPATIENT (OUTPATIENT)
Dept: OUTPATIENT SERVICES | Facility: HOSPITAL | Age: 45
LOS: 1 days | End: 2025-02-06

## 2025-02-06 ENCOUNTER — APPOINTMENT (OUTPATIENT)
Dept: INFUSION THERAPY | Facility: CLINIC | Age: 45
End: 2025-02-06

## 2025-02-06 DIAGNOSIS — Z98.890 OTHER SPECIFIED POSTPROCEDURAL STATES: Chronic | ICD-10-CM

## 2025-02-06 DIAGNOSIS — Z87.74 PERSONAL HISTORY OF (CORRECTED) CONGENITAL MALFORMATIONS OF HEART AND CIRCULATORY SYSTEM: Chronic | ICD-10-CM

## 2025-02-06 DIAGNOSIS — D50.9 IRON DEFICIENCY ANEMIA, UNSPECIFIED: ICD-10-CM

## 2025-02-06 DIAGNOSIS — M25.569 PAIN IN UNSPECIFIED KNEE: Chronic | ICD-10-CM

## 2025-02-06 RX ORDER — IRON SUCROSE 20 MG/ML
200 INJECTION, SOLUTION INTRAVENOUS ONCE
Refills: 0 | Status: ACTIVE | OUTPATIENT
Start: 2025-02-06

## 2025-02-13 ENCOUNTER — APPOINTMENT (OUTPATIENT)
Dept: INFUSION THERAPY | Facility: CLINIC | Age: 45
End: 2025-02-13

## 2025-02-13 ENCOUNTER — OUTPATIENT (OUTPATIENT)
Dept: OUTPATIENT SERVICES | Facility: HOSPITAL | Age: 45
LOS: 1 days | End: 2025-02-13
Payer: COMMERCIAL

## 2025-02-13 VITALS
SYSTOLIC BLOOD PRESSURE: 103 MMHG | HEIGHT: 62 IN | TEMPERATURE: 98 F | HEART RATE: 61 BPM | OXYGEN SATURATION: 98 % | DIASTOLIC BLOOD PRESSURE: 70 MMHG | WEIGHT: 115.08 LBS | RESPIRATION RATE: 17 BRPM

## 2025-02-13 VITALS
OXYGEN SATURATION: 98 % | HEART RATE: 69 BPM | DIASTOLIC BLOOD PRESSURE: 64 MMHG | TEMPERATURE: 98 F | RESPIRATION RATE: 18 BRPM | SYSTOLIC BLOOD PRESSURE: 100 MMHG

## 2025-02-13 DIAGNOSIS — Z87.74 PERSONAL HISTORY OF (CORRECTED) CONGENITAL MALFORMATIONS OF HEART AND CIRCULATORY SYSTEM: Chronic | ICD-10-CM

## 2025-02-13 DIAGNOSIS — Z98.890 OTHER SPECIFIED POSTPROCEDURAL STATES: Chronic | ICD-10-CM

## 2025-02-13 DIAGNOSIS — D50.9 IRON DEFICIENCY ANEMIA, UNSPECIFIED: ICD-10-CM

## 2025-02-13 DIAGNOSIS — M25.569 PAIN IN UNSPECIFIED KNEE: Chronic | ICD-10-CM

## 2025-02-13 PROCEDURE — 96365 THER/PROPH/DIAG IV INF INIT: CPT

## 2025-02-13 RX ORDER — IRON SUCROSE 20 MG/ML
200 INJECTION, SOLUTION INTRAVENOUS ONCE
Refills: 0 | Status: COMPLETED | OUTPATIENT
Start: 2025-02-13 | End: 2025-02-13

## 2025-02-13 RX ADMIN — IRON SUCROSE 220 MILLIGRAM(S): 20 INJECTION, SOLUTION INTRAVENOUS at 12:10

## 2025-02-13 RX ADMIN — IRON SUCROSE 200 MILLIGRAM(S): 20 INJECTION, SOLUTION INTRAVENOUS at 13:00

## 2025-02-14 ENCOUNTER — APPOINTMENT (OUTPATIENT)
Dept: OPHTHALMOLOGY | Facility: CLINIC | Age: 45
End: 2025-02-14
Payer: COMMERCIAL

## 2025-02-14 ENCOUNTER — NON-APPOINTMENT (OUTPATIENT)
Age: 45
End: 2025-02-14

## 2025-02-14 PROCEDURE — 99214 OFFICE O/P EST MOD 30 MIN: CPT

## 2025-02-27 ENCOUNTER — APPOINTMENT (OUTPATIENT)
Dept: OPHTHALMOLOGY | Facility: CLINIC | Age: 45
End: 2025-02-27

## 2025-04-10 ENCOUNTER — APPOINTMENT (OUTPATIENT)
Dept: HEMATOLOGY ONCOLOGY | Facility: CLINIC | Age: 45
End: 2025-04-10
Payer: COMMERCIAL

## 2025-04-10 DIAGNOSIS — D50.9 IRON DEFICIENCY ANEMIA, UNSPECIFIED: ICD-10-CM

## 2025-04-10 DIAGNOSIS — D68.61 ANTIPHOSPHOLIPID SYNDROME: ICD-10-CM

## 2025-04-10 PROCEDURE — 99213 OFFICE O/P EST LOW 20 MIN: CPT | Mod: 93

## 2025-04-24 DIAGNOSIS — D50.9 IRON DEFICIENCY ANEMIA, UNSPECIFIED: ICD-10-CM

## 2025-05-20 ENCOUNTER — LABORATORY RESULT (OUTPATIENT)
Age: 45
End: 2025-05-20

## 2025-07-18 ENCOUNTER — APPOINTMENT (OUTPATIENT)
Dept: HEMATOLOGY ONCOLOGY | Facility: CLINIC | Age: 45
End: 2025-07-18
Payer: COMMERCIAL

## 2025-07-18 VITALS
SYSTOLIC BLOOD PRESSURE: 96 MMHG | TEMPERATURE: 98.3 F | HEIGHT: 62 IN | HEART RATE: 62 BPM | RESPIRATION RATE: 18 BRPM | OXYGEN SATURATION: 100 % | DIASTOLIC BLOOD PRESSURE: 64 MMHG

## 2025-07-18 LAB
FERRITIN SERPL-MCNC: 40 NG/ML
HCT VFR BLD CALC: 38.9 %
HGB BLD-MCNC: 13.1 G/DL
IRON SATN MFR SERPL: 24 %
IRON SERPL-MCNC: 72 UG/DL
LYMPHOCYTES # BLD AUTO: 1.1 K/UL
LYMPHOCYTES NFR BLD AUTO: 25.5 %
MAN DIFF?: NO
MCHC RBC-ENTMCNC: 30.5 PG
MCHC RBC-ENTMCNC: 33.7 G/DL
MCV RBC AUTO: 90.5 FL
NEUTROPHILS # BLD AUTO: 2.7 K/UL
NEUTROPHILS NFR BLD AUTO: 65.4 %
PLATELET # BLD AUTO: 209 K/UL
RBC # BLD: 4.3 M/UL
RBC # FLD: 13.8 %
TIBC SERPL-MCNC: 300 UG/DL
UIBC SERPL-MCNC: 228 UG/DL
WBC # FLD AUTO: 4.2 K/UL

## 2025-07-18 PROCEDURE — 99214 OFFICE O/P EST MOD 30 MIN: CPT

## 2025-07-21 LAB
FOLATE SERPL-MCNC: 18.8 NG/ML
VIT B12 SERPL-MCNC: 680 PG/ML

## 2025-07-31 ENCOUNTER — OUTPATIENT (OUTPATIENT)
Dept: OUTPATIENT SERVICES | Facility: HOSPITAL | Age: 45
LOS: 1 days | End: 2025-07-31

## 2025-07-31 ENCOUNTER — APPOINTMENT (OUTPATIENT)
Dept: INFUSION THERAPY | Facility: CLINIC | Age: 45
End: 2025-07-31

## 2025-07-31 DIAGNOSIS — M25.569 PAIN IN UNSPECIFIED KNEE: Chronic | ICD-10-CM

## 2025-07-31 DIAGNOSIS — Z98.890 OTHER SPECIFIED POSTPROCEDURAL STATES: Chronic | ICD-10-CM

## 2025-07-31 DIAGNOSIS — Z87.74 PERSONAL HISTORY OF (CORRECTED) CONGENITAL MALFORMATIONS OF HEART AND CIRCULATORY SYSTEM: Chronic | ICD-10-CM

## 2025-07-31 DIAGNOSIS — D50.9 IRON DEFICIENCY ANEMIA, UNSPECIFIED: ICD-10-CM

## 2025-09-05 ENCOUNTER — APPOINTMENT (OUTPATIENT)
Dept: INFUSION THERAPY | Facility: CLINIC | Age: 45
End: 2025-09-05

## (undated) DEVICE — VENODYNE/SCD SLEEVE CALF MEDIUM

## (undated) DEVICE — PRESSURE INFUSOR BAG 3000ML

## (undated) DEVICE — WARMING BLANKET UPPER ADULT

## (undated) DEVICE — PACK D&C LNX SURGICOUNT

## (undated) DEVICE — GLV 6.5 PROTEXIS (WHITE)

## (undated) DEVICE — GLV 7.5 PROTEXIS (WHITE)

## (undated) DEVICE — TUBING TUR 2 PRONG

## (undated) DEVICE — DRAPE TOWEL WHITE 17" X 24"

## (undated) DEVICE — PACK D&C

## (undated) DEVICE — SOL IRR BAG NS 0.9% 3000ML